# Patient Record
Sex: FEMALE | Race: BLACK OR AFRICAN AMERICAN | NOT HISPANIC OR LATINO | Employment: FULL TIME | URBAN - METROPOLITAN AREA
[De-identification: names, ages, dates, MRNs, and addresses within clinical notes are randomized per-mention and may not be internally consistent; named-entity substitution may affect disease eponyms.]

---

## 2018-07-20 ENCOUNTER — OFFICE VISIT (OUTPATIENT)
Dept: FAMILY MEDICINE CLINIC | Facility: CLINIC | Age: 38
End: 2018-07-20
Payer: COMMERCIAL

## 2018-07-20 VITALS
HEIGHT: 67 IN | HEART RATE: 80 BPM | WEIGHT: 227.2 LBS | BODY MASS INDEX: 35.66 KG/M2 | RESPIRATION RATE: 16 BRPM | TEMPERATURE: 98.1 F | DIASTOLIC BLOOD PRESSURE: 90 MMHG | SYSTOLIC BLOOD PRESSURE: 140 MMHG

## 2018-07-20 DIAGNOSIS — R03.0 ELEVATED BP WITHOUT DIAGNOSIS OF HYPERTENSION: ICD-10-CM

## 2018-07-20 DIAGNOSIS — N92.0 MENORRHAGIA WITH REGULAR CYCLE: ICD-10-CM

## 2018-07-20 DIAGNOSIS — G43.109 MIGRAINE WITH AURA AND WITHOUT STATUS MIGRAINOSUS, NOT INTRACTABLE: Primary | ICD-10-CM

## 2018-07-20 PROCEDURE — 99203 OFFICE O/P NEW LOW 30 MIN: CPT | Performed by: FAMILY MEDICINE

## 2018-07-20 RX ORDER — TOPIRAMATE 25 MG/1
25 TABLET ORAL DAILY
Qty: 30 TABLET | Refills: 0 | Status: SHIPPED | OUTPATIENT
Start: 2018-07-20 | End: 2018-08-31 | Stop reason: SDUPTHER

## 2018-07-20 RX ORDER — SUMATRIPTAN 25 MG/1
25 TABLET, FILM COATED ORAL ONCE AS NEEDED
Qty: 30 TABLET | Refills: 0 | Status: SHIPPED | OUTPATIENT
Start: 2018-07-20 | End: 2019-10-02

## 2018-07-20 NOTE — PROGRESS NOTES
Assessment/Plan     Migraine with aura and without status migrainosus, not intractable  -  Will start Topamax at 25 mg HS and will increase it to 50 mg HS in a week for prophylaxis with sumatriptan 25 mg for acute migraine  - patient to follow up in 1 month    Elevated BP without diagnosis of hypertension  - blood pressure 140/90 today  - not on any antihypertensives  - recommended lifestyle modifications with dash diet and exercise  - follow-up in 4 weeks  - will also check fasting CMP, lipid panel    Menorrhagia with regular cycle  - patient reports using up to 7 pads in a day and periods lasting for 7 days, but regular  - will check CBC, TSH  - history of abnormal Pap smear patient to come back for Pap smear as well    Diagnoses and all orders for this visit:    Migraine with aura and without status migrainosus, not intractable  -     topiramate (TOPAMAX) 25 mg tablet; Take 1 tablet (25 mg total) by mouth daily  -     SUMAtriptan (IMITREX) 25 mg tablet; Take 1 tablet (25 mg total) by mouth once as needed for migraine for up to 1 dose Maximum 200 mg in 24 hours, may repeat dose x1 after 2 h    Menorrhagia with regular cycle  -     TSH, 3rd generation with T4 reflex; Future  -     CBC and Platelet; Future    Elevated BP without diagnosis of hypertension  -     Comprehensive metabolic panel; Future  -     Lipid panel; Future       Subjective      new patient establish care       74-year-old female presented to office to establish care  She has no acute concerns today but does have history of chronic migraines since childhood, earlier she was on propranolol for prophylaxis and sumatriptan for breakthrough but she states that it did not help as much  She has last time she saw doctor was 2 years ago and since they were not helping she decided to discontinue those medications  She also complains of heavy periods with periods lasting for 7 days with change of 7 pads in a day, last time she had a  Was 2 weeks ago    She does have history of abnormal Pap smears and last time he had she had a Pap smear was years ago  Her youngest child is 15years old delivered by   She is not sexually active and not on any contraception  Review of Systems   Constitutional: Negative for activity change, chills and fever  HENT: Negative for congestion, facial swelling and rhinorrhea  Eyes: Negative for discharge  Respiratory: Negative for shortness of breath  Cardiovascular: Negative for chest pain  Gastrointestinal: Negative for diarrhea, nausea and vomiting  Endocrine: Negative for cold intolerance  Genitourinary: Negative for difficulty urinating  Neurological: Positive for headaches  Negative for tremors, seizures, syncope and numbness  Psychiatric/Behavioral: Negative for agitation and behavioral problems  Objective     Vitals:Blood pressure 140/90, pulse 80, temperature 98 1 °F (36 7 °C), resp  rate 16, height 5' 6 8" (1 697 m), weight 103 kg (227 lb 3 2 oz)  Physical Exam:  Physical Exam   Constitutional: She is oriented to person, place, and time  She appears well-developed and well-nourished  HENT:   Head: Normocephalic and atraumatic  Right Ear: External ear normal    Left Ear: External ear normal    Mouth/Throat: Oropharynx is clear and moist    Eyes: Conjunctivae and EOM are normal  Pupils are equal, round, and reactive to light  Neck: Normal range of motion  Neck supple  Cardiovascular: Normal rate, regular rhythm, normal heart sounds and intact distal pulses  Exam reveals no friction rub  No murmur heard  Pulmonary/Chest: Effort normal and breath sounds normal  No respiratory distress  She has no wheezes  She has no rales  Abdominal: Soft  Bowel sounds are normal  She exhibits no distension  There is no tenderness  Musculoskeletal: Normal range of motion  Neurological: She is alert and oriented to person, place, and time  Skin: Skin is warm and dry

## 2018-07-20 NOTE — ASSESSMENT & PLAN NOTE
- blood pressure 140/90 today  - not on any antihypertensives  - recommended lifestyle modifications with dash diet and exercise  - follow-up in 4 weeks  - will also check fasting CMP, lipid panel

## 2018-07-20 NOTE — ASSESSMENT & PLAN NOTE
-  Will start Topamax at 25 mg HS and will increase it to 50 mg HS in a week for prophylaxis with sumatriptan 25 mg for acute migraine  - patient to follow up in 1 month

## 2018-07-20 NOTE — ASSESSMENT & PLAN NOTE
- patient reports using up to 7 pads in a day and periods lasting for 7 days, but regular  - will check CBC, TSH  - history of abnormal Pap smear patient to come back for Pap smear as well

## 2018-07-27 ENCOUNTER — TRANSCRIBE ORDERS (OUTPATIENT)
Dept: LAB | Facility: CLINIC | Age: 38
End: 2018-07-27

## 2018-07-27 ENCOUNTER — APPOINTMENT (OUTPATIENT)
Dept: LAB | Facility: CLINIC | Age: 38
End: 2018-07-27
Payer: COMMERCIAL

## 2018-07-27 DIAGNOSIS — R03.0 ELEVATED BP WITHOUT DIAGNOSIS OF HYPERTENSION: ICD-10-CM

## 2018-07-27 DIAGNOSIS — N92.0 MENORRHAGIA WITH REGULAR CYCLE: ICD-10-CM

## 2018-07-27 LAB
ALBUMIN SERPL BCP-MCNC: 3 G/DL (ref 3.5–5)
ALP SERPL-CCNC: 71 U/L (ref 46–116)
ALT SERPL W P-5'-P-CCNC: 21 U/L (ref 12–78)
ANION GAP SERPL CALCULATED.3IONS-SCNC: 7 MMOL/L (ref 4–13)
AST SERPL W P-5'-P-CCNC: 17 U/L (ref 5–45)
BILIRUB SERPL-MCNC: 0.4 MG/DL (ref 0.2–1)
BUN SERPL-MCNC: 9 MG/DL (ref 5–25)
CALCIUM SERPL-MCNC: 8.1 MG/DL (ref 8.3–10.1)
CHLORIDE SERPL-SCNC: 105 MMOL/L (ref 100–108)
CHOLEST SERPL-MCNC: 189 MG/DL (ref 50–200)
CO2 SERPL-SCNC: 25 MMOL/L (ref 21–32)
CREAT SERPL-MCNC: 0.83 MG/DL (ref 0.6–1.3)
ERYTHROCYTE [DISTWIDTH] IN BLOOD BY AUTOMATED COUNT: 16.8 % (ref 11.6–15.1)
GFR SERPL CREATININE-BSD FRML MDRD: 103 ML/MIN/1.73SQ M
GLUCOSE P FAST SERPL-MCNC: 98 MG/DL (ref 65–99)
HCT VFR BLD AUTO: 36.7 % (ref 34.8–46.1)
HDLC SERPL-MCNC: 58 MG/DL (ref 40–60)
HGB BLD-MCNC: 11.4 G/DL (ref 11.5–15.4)
LDLC SERPL CALC-MCNC: 112 MG/DL (ref 0–100)
MCH RBC QN AUTO: 25.2 PG (ref 26.8–34.3)
MCHC RBC AUTO-ENTMCNC: 31.1 G/DL (ref 31.4–37.4)
MCV RBC AUTO: 81 FL (ref 82–98)
NONHDLC SERPL-MCNC: 131 MG/DL
PLATELET # BLD AUTO: 421 THOUSANDS/UL (ref 149–390)
PMV BLD AUTO: 9.4 FL (ref 8.9–12.7)
POTASSIUM SERPL-SCNC: 3.4 MMOL/L (ref 3.5–5.3)
PROT SERPL-MCNC: 7.7 G/DL (ref 6.4–8.2)
RBC # BLD AUTO: 4.52 MILLION/UL (ref 3.81–5.12)
SODIUM SERPL-SCNC: 137 MMOL/L (ref 136–145)
TRIGL SERPL-MCNC: 96 MG/DL
TSH SERPL DL<=0.05 MIU/L-ACNC: 0.95 UIU/ML (ref 0.36–3.74)
WBC # BLD AUTO: 7.21 THOUSAND/UL (ref 4.31–10.16)

## 2018-07-27 PROCEDURE — 80053 COMPREHEN METABOLIC PANEL: CPT

## 2018-07-27 PROCEDURE — 84443 ASSAY THYROID STIM HORMONE: CPT

## 2018-07-27 PROCEDURE — 36415 COLL VENOUS BLD VENIPUNCTURE: CPT

## 2018-07-27 PROCEDURE — 80061 LIPID PANEL: CPT

## 2018-07-27 PROCEDURE — 85027 COMPLETE CBC AUTOMATED: CPT

## 2018-08-13 ENCOUNTER — OFFICE VISIT (OUTPATIENT)
Dept: FAMILY MEDICINE CLINIC | Facility: CLINIC | Age: 38
End: 2018-08-13
Payer: COMMERCIAL

## 2018-08-13 VITALS
HEIGHT: 67 IN | DIASTOLIC BLOOD PRESSURE: 90 MMHG | TEMPERATURE: 98.9 F | HEART RATE: 82 BPM | WEIGHT: 222.4 LBS | BODY MASS INDEX: 34.91 KG/M2 | RESPIRATION RATE: 16 BRPM | SYSTOLIC BLOOD PRESSURE: 130 MMHG

## 2018-08-13 DIAGNOSIS — B30.9 ACUTE VIRAL CONJUNCTIVITIS OF RIGHT EYE: ICD-10-CM

## 2018-08-13 DIAGNOSIS — G43.109 MIGRAINE WITH AURA AND WITHOUT STATUS MIGRAINOSUS, NOT INTRACTABLE: Primary | ICD-10-CM

## 2018-08-13 DIAGNOSIS — J02.9 PHARYNGITIS, UNSPECIFIED ETIOLOGY: ICD-10-CM

## 2018-08-13 PROCEDURE — 99213 OFFICE O/P EST LOW 20 MIN: CPT | Performed by: FAMILY MEDICINE

## 2018-08-13 RX ORDER — IBUPROFEN 600 MG/1
600 TABLET ORAL EVERY 6 HOURS PRN
Qty: 90 TABLET | Refills: 0 | Status: SHIPPED | OUTPATIENT
Start: 2018-08-13 | End: 2022-03-10 | Stop reason: ALTCHOICE

## 2018-08-13 RX ORDER — NEOMYCIN/POLYMYXIN B/HYDROCORT 3.5-10K-1
1 SUSPENSION, DROPS(FINAL DOSAGE FORM)(ML) OPHTHALMIC (EYE) 3 TIMES DAILY
Qty: 7.5 ML | Refills: 0 | Status: SHIPPED | OUTPATIENT
Start: 2018-08-13 | End: 2018-08-31 | Stop reason: ALTCHOICE

## 2018-08-13 RX ORDER — KETOROLAC TROMETHAMINE 30 MG/ML
30 INJECTION, SOLUTION INTRAMUSCULAR; INTRAVENOUS ONCE
Status: COMPLETED | OUTPATIENT
Start: 2018-08-13 | End: 2018-08-13

## 2018-08-13 RX ADMIN — KETOROLAC TROMETHAMINE 30 MG: 30 INJECTION, SOLUTION INTRAMUSCULAR; INTRAVENOUS at 12:15

## 2018-08-13 NOTE — ASSESSMENT & PLAN NOTE
Given high risk work setting will Rx ophth antibiotic solution and return to work after 24 hours  Hand washing stressed    If symptoms are not improving recommend ophthalmology exam

## 2018-08-13 NOTE — LETTER
August 13, 2018     Patient: Catarino Madden   YOB: 1980   Date of Visit: 8/13/2018       To Whom it May Concern:    Catarino Madden is under my professional care  She was seen in my office on 8/13/2018  She may return to work on Wednesday, August 15, 2018       If you have any questions or concerns, please don't hesitate to call           Sincerely,          Terri Cedillo MD        CC: No Recipients

## 2018-08-13 NOTE — PROGRESS NOTES
Rabia Andres 1980 female MRN: [id number]    Family Medicine Acute Visit    ASSESSMENT/PLAN  Problem List Items Addressed This Visit        Digestive    Pharyngitis     Likely viral   Symptomatic treatment with ibuprofen and plenty of fluids  Cardiovascular and Mediastinum    Migraine with aura and without status migrainosus, not intractable - Primary     No neurological deficits  Stop sumatriptan as it's not likely to be effective this many days in to HA  Toradol 30 mg IM today in office, then ibuprofen 600 mg Q6h prn x 1-2 days  Ensure adequate hydration  Return for re-evaluation if not improving with resolution of viral infection  Continue topiramate for prophylaxis, discuss titration of med dose with PCP  Relevant Medications    ibuprofen (MOTRIN) 600 mg tablet    ketorolac (TORADOL) injection 30 mg (Completed)       Other    Acute viral conjunctivitis of right eye     Given high risk work setting will Rx ophth antibiotic solution and return to work after 24 hours  Hand washing stressed  If symptoms are not improving recommend ophthalmology exam            Relevant Medications    neomycin-polymyxin-hydrocortisone (CORTISPORIN) 0 35%-10,000 units/mL-1% ophthalmic suspension    ketorolac (TORADOL) injection 30 mg (Completed)              Future Appointments  Date Time Provider Bianca Dutta   8/17/2018 10:30 AM Monik Russo MD [facility]          SUBJECTIVE  CC: Conjunctivitis; Sore Throat; and Headache      HPI:  Rabia Andres is a 44 y o  female who presents for 4 days of sore throat similar 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  Throat pain is starting to improve  Had ear pain now resolved  Yesterday developed cough and purulent d/c from R eye  Current HA associated with blurred vision, but h/o migraines and likely aggravated by current illness  Has been using sumatriptan without relief and taking topiramate daily          Review of Systems Constitutional: Negative for chills and fever  HENT: Positive for congestion, ear pain, rhinorrhea and sore throat  Negative for ear discharge and trouble swallowing  Eyes: Positive for discharge, redness and visual disturbance  Respiratory: Positive for cough  Negative for wheezing  Cardiovascular: Negative for chest pain  Gastrointestinal: Positive for nausea (due to migraine)  Negative for abdominal pain  Genitourinary: Negative for difficulty urinating and dysuria  Skin: Negative for rash  Neurological: Positive for headaches  Negative for dizziness and numbness  Historical Information   The patient history was reviewed as follows:  Past Medical History:   Diagnosis Date    Heart murmur          Past Surgical History:   Procedure Laterality Date     SECTION      CHOLECYSTECTOMY       No family history on file  Social History   History   Alcohol use Not on file     History   Drug Use No     History   Smoking Status    Never Smoker   Smokeless Tobacco    Not on file       Medications:     Current Outpatient Prescriptions:     SUMAtriptan (IMITREX) 25 mg tablet, Take 1 tablet (25 mg total) by mouth once as needed for migraine for up to 1 dose Maximum 200 mg in 24 hours, may repeat dose x1 after 2 h, Disp: 30 tablet, Rfl: 0    topiramate (TOPAMAX) 25 mg tablet, Take 1 tablet (25 mg total) by mouth daily, Disp: 30 tablet, Rfl: 0    ibuprofen (MOTRIN) 600 mg tablet, Take 1 tablet (600 mg total) by mouth every 6 (six) hours as needed for mild pain or headaches, Disp: 90 tablet, Rfl: 0    neomycin-polymyxin-hydrocortisone (CORTISPORIN) 0 35%-10,000 units/mL-1% ophthalmic suspension, Administer 1 drop to the right eye 3 (three) times a day, Disp: 7 5 mL, Rfl: 0  No current facility-administered medications for this visit       No Known Allergies    OBJECTIVE  Vitals:   Vitals:    18 1048   BP: 130/90   Pulse: 82   Resp: 16   Temp: 98 9 °F (37 2 °C)   Weight: 101 kg (222 lb 6 4 oz)   Height: 5' 6 8" (1 697 m)       Physical Exam   Constitutional: She is oriented to person, place, and time  She appears well-developed and well-nourished  No distress  Mildly ill-appearing, non-toxic   HENT:   Head: Normocephalic and atraumatic  Right Ear: Tympanic membrane, external ear and ear canal normal    Left Ear: Tympanic membrane, external ear and ear canal normal    Nose: Nose normal    Mouth/Throat: Oropharynx is clear and moist    Eyes: EOM are normal  Pupils are equal, round, and reactive to light  Right eye exhibits discharge (white/clear)  Left eye exhibits no discharge  Right conjunctiva is injected  Left conjunctiva is not injected  No scleral icterus  Neck: Neck supple  No thyromegaly present  Cardiovascular: Normal rate, regular rhythm, normal heart sounds and intact distal pulses  Pulmonary/Chest: Effort normal and breath sounds normal    Abdominal: Soft  Bowel sounds are normal  She exhibits no distension  There is no tenderness  Musculoskeletal: She exhibits no edema  Lymphadenopathy:     She has no cervical adenopathy  Neurological: She is alert and oriented to person, place, and time  She displays normal reflexes  No cranial nerve deficit  She exhibits normal muscle tone  Skin: No rash noted  No pallor  Psychiatric: She has a normal mood and affect  Vitals reviewed         Aliya Nagel MD  [address]  8/13/2018

## 2018-08-13 NOTE — ASSESSMENT & PLAN NOTE
No neurological deficits  Stop sumatriptan as it's not likely to be effective this many days in to HA  Toradol 30 mg IM today in office, then ibuprofen 600 mg Q6h prn x 1-2 days  Ensure adequate hydration  Return for re-evaluation if not improving with resolution of viral infection  Continue topiramate for prophylaxis, discuss titration of med dose with PCP

## 2018-08-17 ENCOUNTER — OFFICE VISIT (OUTPATIENT)
Dept: FAMILY MEDICINE CLINIC | Facility: CLINIC | Age: 38
End: 2018-08-17
Payer: COMMERCIAL

## 2018-08-17 VITALS
BODY MASS INDEX: 35.31 KG/M2 | DIASTOLIC BLOOD PRESSURE: 80 MMHG | WEIGHT: 225 LBS | SYSTOLIC BLOOD PRESSURE: 128 MMHG | HEIGHT: 67 IN | RESPIRATION RATE: 16 BRPM | HEART RATE: 78 BPM | TEMPERATURE: 97.6 F

## 2018-08-17 DIAGNOSIS — Z12.4 CERVICAL CANCER SCREENING: Primary | ICD-10-CM

## 2018-08-17 DIAGNOSIS — N92.0 MENORRHAGIA WITH REGULAR CYCLE: ICD-10-CM

## 2018-08-17 PROCEDURE — 87591 N.GONORRHOEAE DNA AMP PROB: CPT | Performed by: FAMILY MEDICINE

## 2018-08-17 PROCEDURE — G0145 SCR C/V CYTO,THINLAYER,RESCR: HCPCS | Performed by: FAMILY MEDICINE

## 2018-08-17 PROCEDURE — 87491 CHLMYD TRACH DNA AMP PROBE: CPT | Performed by: FAMILY MEDICINE

## 2018-08-17 PROCEDURE — 87624 HPV HI-RISK TYP POOLED RSLT: CPT | Performed by: FAMILY MEDICINE

## 2018-08-17 PROCEDURE — 99213 OFFICE O/P EST LOW 20 MIN: CPT | Performed by: FAMILY MEDICINE

## 2018-08-17 NOTE — PROGRESS NOTES
ASSESSMENT & PLAN: Jose J Villeda is a 44 y o   with normal gynecologic exam     1   Routine well woman exam done today  2  Pap and HPV:  The patient's  Pap and cotesting was done today  Current ASCCP Guidelines reviewed  3  The following were reviewed in today's visit: breast self exam, STD testing, exercise and healthy diet  4  Menorrhagia: labs reviewed   TSH normal  Will also get US of pelvic organs and have patient follow up once we have results of  Pap smear  5  Obesity  Pt counseled on lifestyle modification with weight and exercise  FLP reviewed ,   Pt agreeable on dietary modification and exercise  CC:  Annual Gynecologic Examination    HPI: Jose J Villeda is a 44 y o   who presents for annual gynecologic examination  History of abnormal pap smear in , had colposcopy done at that time which was normal , subsequent pap smears normal   Last Pap smear was 4 or 5 years ago,, patient does not have records   LMP 2 weeks ago, patient complains of heavy periods with change of pad 7 times in a day for last few years, with passage of clots  Patient's last menstrual period was 2018 (approximate)  History of sexually transmitted infection No  Patient is currently sexually active  heterosexual Birth control: none  Health Maintenance:    She exercises 2 days per week with walks  She wears her seatbelt routinely  She does not perform regular monthly self breast exams  She feels safe at home  Patients does follow a healthy diet  Past Surgical History:   Procedure Laterality Date     SECTION      CHOLECYSTECTOMY         Past OB/Gyn History:  OB History      Para Term  AB Living    4 4       3    SAB TAB Ectopic Multiple Live Births                        No family history on file      Social History:  Social History     Social History    Marital status: Single     Spouse name: N/A    Number of children: N/A    Years of education: N/A Occupational History    Not on file  Social History Main Topics    Smoking status: Never Smoker    Smokeless tobacco: Not on file    Alcohol use Not on file    Drug use: No    Sexual activity: Not on file     Other Topics Concern    Not on file     Social History Narrative    No narrative on file     Presently lives with kids  No Known Allergies    Current Outpatient Prescriptions:     ibuprofen (MOTRIN) 600 mg tablet, Take 1 tablet (600 mg total) by mouth every 6 (six) hours as needed for mild pain or headaches, Disp: 90 tablet, Rfl: 0    neomycin-polymyxin-hydrocortisone (CORTISPORIN) 0 35%-10,000 units/mL-1% ophthalmic suspension, Administer 1 drop to the right eye 3 (three) times a day, Disp: 7 5 mL, Rfl: 0    SUMAtriptan (IMITREX) 25 mg tablet, Take 1 tablet (25 mg total) by mouth once as needed for migraine for up to 1 dose Maximum 200 mg in 24 hours, may repeat dose x1 after 2 h, Disp: 30 tablet, Rfl: 0    topiramate (TOPAMAX) 25 mg tablet, Take 1 tablet (25 mg total) by mouth daily, Disp: 30 tablet, Rfl: 0    Review of Systems:  Review of Systems   Constitutional: Negative for activity change, chills, fatigue and fever  HENT: Negative for congestion, hearing loss, postnasal drip, rhinorrhea and sore throat  Respiratory: Negative for cough, chest tightness, shortness of breath and wheezing  Gastrointestinal: Negative for abdominal distention, blood in stool, diarrhea, nausea and vomiting  Genitourinary: Positive for vaginal bleeding (excessive vaginal bleeding during periods)  Negative for difficulty urinating  Musculoskeletal: Negative for arthralgias and back pain  Physical Exam:  /80   Pulse 78   Temp 97 6 °F (36 4 °C)   Resp 16   Ht 5' 6 8" (1 697 m)   Wt 102 kg (225 lb)   LMP 08/06/2018 (Approximate)   BMI 35 45 kg/m²    GEN: The patient was alert and oriented x3, pleasant well-appearing female in no acute distress     HEENT:  Unremarkable, no anterior or posterior lymphadenopathy, no thyromegaly  CV:  RRR, no murmurs  RESP:  Clear to auscultation bilaterally  BREAST:  Symmetric breasts with no palpable breast masses or obvious breast lesions  She has no retractions or nipple discharge  She has no axillary abnormalities or palpable masses  Self breast exam is taught  ABD:  Soft, nontender, nondistended, normoactive bowel sounds  EXT:  WWP, nontender, no edema  BACK:  No CVA tenderness, no tenderness to palpation along spine  PELVIC:  Normal appearing external female genitalia, normal vaginal epithelium, No discharge  Cervix present   Bimanual: absent CMT,   Uterus enlarged to 8 weeks size, firm , non-tender  No palpable adnexal masses

## 2018-08-20 LAB
CHLAMYDIA DNA CVX QL NAA+PROBE: NORMAL
N GONORRHOEA DNA GENITAL QL NAA+PROBE: NORMAL

## 2018-08-21 LAB
HPV HR 12 DNA CVX QL NAA+PROBE: NEGATIVE
HPV16 DNA CVX QL NAA+PROBE: NEGATIVE
HPV18 DNA CVX QL NAA+PROBE: NEGATIVE

## 2018-08-22 LAB
LAB AP GYN PRIMARY INTERPRETATION: NORMAL
Lab: NORMAL
PATH INTERP SPEC-IMP: NORMAL

## 2018-08-31 ENCOUNTER — OFFICE VISIT (OUTPATIENT)
Dept: FAMILY MEDICINE CLINIC | Facility: CLINIC | Age: 38
End: 2018-08-31
Payer: COMMERCIAL

## 2018-08-31 VITALS
DIASTOLIC BLOOD PRESSURE: 80 MMHG | HEIGHT: 67 IN | HEART RATE: 74 BPM | BODY MASS INDEX: 35.66 KG/M2 | TEMPERATURE: 98 F | WEIGHT: 227.2 LBS | RESPIRATION RATE: 14 BRPM | SYSTOLIC BLOOD PRESSURE: 124 MMHG

## 2018-08-31 DIAGNOSIS — N92.0 MENORRHAGIA WITH REGULAR CYCLE: ICD-10-CM

## 2018-08-31 DIAGNOSIS — R03.0 ELEVATED BP WITHOUT DIAGNOSIS OF HYPERTENSION: ICD-10-CM

## 2018-08-31 DIAGNOSIS — G43.109 MIGRAINE WITH AURA AND WITHOUT STATUS MIGRAINOSUS, NOT INTRACTABLE: Primary | ICD-10-CM

## 2018-08-31 PROBLEM — J02.9 PHARYNGITIS: Status: RESOLVED | Noted: 2018-08-13 | Resolved: 2018-08-31

## 2018-08-31 PROBLEM — B30.9 ACUTE VIRAL CONJUNCTIVITIS OF RIGHT EYE: Status: RESOLVED | Noted: 2018-08-13 | Resolved: 2018-08-31

## 2018-08-31 PROCEDURE — 99213 OFFICE O/P EST LOW 20 MIN: CPT | Performed by: FAMILY MEDICINE

## 2018-08-31 RX ORDER — TOPIRAMATE 25 MG/1
50 TABLET ORAL 2 TIMES DAILY
Qty: 60 TABLET | Refills: 2 | Status: SHIPPED | OUTPATIENT
Start: 2018-08-31 | End: 2018-08-31 | Stop reason: DRUGHIGH

## 2018-08-31 RX ORDER — TOPIRAMATE 50 MG/1
50 TABLET, FILM COATED ORAL 2 TIMES DAILY
Qty: 60 TABLET | Refills: 3 | Status: SHIPPED | OUTPATIENT
Start: 2018-08-31 | End: 2019-10-02

## 2018-08-31 NOTE — ASSESSMENT & PLAN NOTE
Patient reports using up to 7 pads in a day and periods lasting for 7 days, but regular, TSH 0 95, Hb 11 4, Pap smear normal   Advised patient to get ultrasound of pelvic organs done, patient scheduled for it next week    Will follow up

## 2018-08-31 NOTE — ASSESSMENT & PLAN NOTE
Patient is still getting headaches every week, currently taking Topamax 50 mg daily  Advised her to increase Topamax to 50 mg b i d  Also advised her to keep a log of headaches  Told patient that she may take ibuprofen 600 mg Q 8 hr p r n  in case of acute headache   will have her follow up in 4 weeks

## 2018-08-31 NOTE — PROGRESS NOTES
Assessment/Plan     Migraine with aura and without status migrainosus, not intractable  Patient is still getting headaches every week, currently taking Topamax 50 mg daily  Advised her to increase Topamax to 50 mg b i d  Also advised her to keep a log of headaches  Told patient that she may take ibuprofen 600 mg Q 8 hr p r n  in case of acute headache   will have her follow up in 4 weeks  Elevated BP without diagnosis of hypertension    Blood pressure 124/80 today, reviewed labs with patient  Advised to continue Dash diet with lifestyle modifications  Menorrhagia with regular cycle  Patient reports using up to 7 pads in a day and periods lasting for 7 days, but regular, TSH 0 95, Hb 11 4, Pap smear normal   Advised patient to get ultrasound of pelvic organs done, patient scheduled for it next week  Will follow up      Diagnoses and all orders for this visit:    Migraine with aura and without status migrainosus, not intractable  -     topiramate (TOPAMAX) 50 mg tablet; by mouth 2 (two) times a day    Menorrhagia with regular cycle         Subjective     Chief Complaint   Patient presents with    Physical Exam        26-year-old female presented to office for a follow-up visit, patient reports that since starting Topamax the headaches are not that severe but are still occurring every week  Patient states that sumatriptan does not help her and she is not taking that anymore, also she does not take any medication once the headache starts  Patient also menorrhagia currently having periods, on 2nd day, reports using up to 7 pads per day  Patient was advised to get ultrasound done in  Last visit  But could not get it done, will get it done next week  No other acute concerns this visit          The following portions of the patient's history were reviewed and updated as appropriate: allergies, current medications, past family history, past medical history, past social history, past surgical history and problem list     Review of Systems   Constitutional: Negative for activity change, diaphoresis, fatigue and fever  HENT: Negative for congestion, rhinorrhea and sore throat  Respiratory: Negative for cough, chest tightness, shortness of breath and wheezing  Cardiovascular: Negative for chest pain and palpitations  Gastrointestinal: Negative for abdominal distention, diarrhea, nausea and vomiting  Genitourinary: Negative for difficulty urinating  Musculoskeletal: Negative for back pain  Skin: Negative for rash  Neurological: Positive for headaches  Negative for seizures and weakness  Psychiatric/Behavioral: Negative for agitation  Objective     Vitals:Blood pressure 124/80, pulse 74, temperature 98 °F (36 7 °C), temperature source Tympanic, resp  rate 14, height 5' 6 8" (1 697 m), weight 103 kg (227 lb 3 2 oz), last menstrual period 08/06/2018  Physical Exam:  Physical Exam   Constitutional: She is oriented to person, place, and time  She appears well-developed and well-nourished  HENT:   Head: Normocephalic and atraumatic  Mouth/Throat: Oropharynx is clear and moist    Eyes: Conjunctivae and EOM are normal  Pupils are equal, round, and reactive to light  Neck: Normal range of motion  Neck supple  Cardiovascular: Normal rate, regular rhythm and normal heart sounds  Exam reveals no friction rub  No murmur heard  Pulmonary/Chest: Effort normal and breath sounds normal  No respiratory distress  She has no wheezes  She has no rales  Abdominal: Soft  Bowel sounds are normal  She exhibits no distension  There is no tenderness  Musculoskeletal: Normal range of motion  Neurological: She is alert and oriented to person, place, and time  Skin: Skin is warm and dry  Vitals reviewed

## 2018-08-31 NOTE — ASSESSMENT & PLAN NOTE
Blood pressure 124/80 today, reviewed labs with patient  Advised to continue Dash diet with lifestyle modifications

## 2019-07-17 ENCOUNTER — OFFICE VISIT (OUTPATIENT)
Dept: FAMILY MEDICINE CLINIC | Facility: CLINIC | Age: 39
End: 2019-07-17

## 2019-07-17 VITALS
BODY MASS INDEX: 36.65 KG/M2 | WEIGHT: 232.6 LBS | RESPIRATION RATE: 18 BRPM | TEMPERATURE: 99.2 F | DIASTOLIC BLOOD PRESSURE: 88 MMHG | HEART RATE: 104 BPM | SYSTOLIC BLOOD PRESSURE: 136 MMHG

## 2019-07-17 DIAGNOSIS — J01.80 ACUTE NON-RECURRENT SINUSITIS OF OTHER SINUS: Primary | ICD-10-CM

## 2019-07-17 PROCEDURE — 99213 OFFICE O/P EST LOW 20 MIN: CPT | Performed by: PHYSICIAN ASSISTANT

## 2019-07-17 RX ORDER — FLUTICASONE PROPIONATE 50 MCG
2 SPRAY, SUSPENSION (ML) NASAL DAILY PRN
Qty: 1 BOTTLE | Refills: 0 | Status: SHIPPED | OUTPATIENT
Start: 2019-07-17 | End: 2019-10-02

## 2019-07-17 RX ORDER — AMOXICILLIN AND CLAVULANATE POTASSIUM 875; 125 MG/1; MG/1
1 TABLET, FILM COATED ORAL EVERY 12 HOURS SCHEDULED
Qty: 20 TABLET | Refills: 0 | Status: SHIPPED | OUTPATIENT
Start: 2019-07-17 | End: 2019-07-27

## 2019-07-17 NOTE — PROGRESS NOTES
Assessment/Plan:    Other acute sinusitis  Augmentin 875mg Bid x 10 days  Flonase 2 sprays each nostril qd prn  OTC pain medication prn      Diagnoses and all orders for this visit:    Acute non-recurrent sinusitis of other sinus  -     amoxicillin-clavulanate (AUGMENTIN) 875-125 mg per tablet; Take 1 tablet by mouth every 12 (twelve) hours for 10 days  -     fluticasone (FLONASE) 50 mcg/act nasal spray; 2 sprays into each nostril daily as needed for rhinitis        Subjective:      Patient ID: Tamara Mejia is a 43 y o  female  C/o cold symptoms x 2 weeks  URI    This is a new problem  Episode onset: 2 weeks  The problem has been gradually worsening  There has been no fever  Associated symptoms include congestion, coughing, rhinorrhea, sinus pain and sneezing  Pertinent negatives include no sore throat  Treatments tried: Dayquil  The treatment provided no relief  The following portions of the patient's history were reviewed and updated as appropriate: allergies, current medications, past family history, past medical history, past social history, past surgical history and problem list     Review of Systems   Constitutional: Negative for chills and fever  HENT: Positive for congestion, rhinorrhea, sinus pressure, sinus pain and sneezing  Negative for ear discharge, facial swelling, sore throat and trouble swallowing  Respiratory: Positive for cough  Negative for chest tightness  Cardiovascular: Negative  Objective:      /88 (BP Location: Left arm, Patient Position: Sitting, Cuff Size: Large)   Pulse 104   Temp 99 2 °F (37 3 °C) (Tympanic)   Resp 18   Wt 106 kg (232 lb 9 6 oz)   BMI 36 65 kg/m²          Physical Exam   Constitutional: She is oriented to person, place, and time  She appears well-developed and well-nourished  Non-toxic appearance  She does not have a sickly appearance  She does not appear ill  No distress     HENT:   Right Ear: Tympanic membrane, external ear and ear canal normal    Left Ear: Tympanic membrane, external ear and ear canal normal    Nose: Rhinorrhea present  Right sinus exhibits maxillary sinus tenderness and frontal sinus tenderness  Left sinus exhibits no maxillary sinus tenderness and no frontal sinus tenderness  Mouth/Throat: Posterior oropharyngeal erythema present  No tonsillar exudate  Bilateral turbinates erythema and inflammation   Eyes: Pupils are equal, round, and reactive to light  Neck: Normal range of motion  Neck supple  Cardiovascular: Normal rate, regular rhythm and normal heart sounds  Pulmonary/Chest: Effort normal and breath sounds normal  No respiratory distress  She has no wheezes  Abdominal: Soft  Bowel sounds are normal  There is no tenderness  Lymphadenopathy:     She has no cervical adenopathy  Neurological: She is alert and oriented to person, place, and time  Skin: Skin is warm and dry  Psychiatric: She has a normal mood and affect   Her behavior is normal

## 2019-07-17 NOTE — PATIENT INSTRUCTIONS

## 2019-10-02 ENCOUNTER — OFFICE VISIT (OUTPATIENT)
Dept: FAMILY MEDICINE CLINIC | Facility: CLINIC | Age: 39
End: 2019-10-02

## 2019-10-02 VITALS
WEIGHT: 258.6 LBS | HEIGHT: 66 IN | TEMPERATURE: 99.3 F | BODY MASS INDEX: 41.56 KG/M2 | DIASTOLIC BLOOD PRESSURE: 80 MMHG | SYSTOLIC BLOOD PRESSURE: 124 MMHG | HEART RATE: 88 BPM | RESPIRATION RATE: 16 BRPM

## 2019-10-02 DIAGNOSIS — N92.1 MENORRHAGIA WITH IRREGULAR CYCLE: Primary | ICD-10-CM

## 2019-10-02 DIAGNOSIS — Z00.00 PHYSICAL EXAM, ANNUAL: ICD-10-CM

## 2019-10-02 PROCEDURE — 99396 PREV VISIT EST AGE 40-64: CPT | Performed by: PHYSICIAN ASSISTANT

## 2019-10-02 PROCEDURE — 99213 OFFICE O/P EST LOW 20 MIN: CPT | Performed by: PHYSICIAN ASSISTANT

## 2019-10-02 NOTE — PROGRESS NOTES
Assessment/Plan:    Physical exam, annual  Will check labs  Order given for screening mammogram  Last pap- 2018 nl  TDAP-2014    Menorrhagia with irregular cycle  Will check HCG  Will order transvaginal ultrasound  Check labs  Pt advised to call to schedule appt once ultrasound has been completed  Diagnoses and all orders for this visit:    Menorrhagia with irregular cycle  -     CBC and differential; Future  -     Ferritin; Future  -     Iron; Future  -     US pelvis complete w transvaginal; Future  -     Pregnancy Test (HCG Qualitative); Future    Physical exam, annual  -     Mammo screening bilateral w 3d & cad; Future  -     CBC and differential; Future  -     Comprehensive metabolic panel; Future  -     HEMOGLOBIN A1C W/ EAG ESTIMATION; Future  -     Lipid panel; Future  -     TSH, 3rd generation with Free T4 reflex; Future  -     UA w Reflex to Microscopic w Reflex to Culture -Lab Collect        Subjective:      Patient ID: Jazmín Hauser is a 35 y o  female  HPI     Here today for physical exam and c/o irregular heavy periods  She reports her menstrual cycles have always been heavy but the last 6 months or so she's getting her period 2x/month  Period last for 7 days and is heavy the entire time  Changes her pad every 2-2 5 hrs  Is sexually active  Does not use protection  Has tired OCP in the past with no improvement  Denies any shortness of breath, palpitations, chest pain, fatigue, PICA, swelling, or cramping  Last pap- 2018- normal    The following portions of the patient's history were reviewed and updated as appropriate: allergies, current medications, past family history, past medical history, past social history, past surgical history and problem list     Review of Systems   Constitutional: Negative for chills, fatigue and fever  Respiratory: Negative for cough, chest tightness, shortness of breath and wheezing  Cardiovascular: Negative for chest pain and palpitations  Gastrointestinal: Negative for abdominal pain, constipation, diarrhea, nausea and vomiting  Genitourinary: Positive for menstrual problem and vaginal bleeding  Negative for difficulty urinating, hematuria, pelvic pain and vaginal pain  Musculoskeletal: Negative for arthralgias, myalgias, neck pain and neck stiffness  Skin: Negative for rash and wound  Neurological: Negative for dizziness, weakness, light-headedness, numbness and headaches  Psychiatric/Behavioral: Negative for agitation and behavioral problems  The patient is not nervous/anxious  Objective:      /80 (BP Location: Left arm, Patient Position: Sitting, Cuff Size: Large)   Pulse 88   Temp 99 3 °F (37 4 °C) (Tympanic)   Resp 16   Ht 5' 6" (1 676 m)   Wt 117 kg (258 lb 9 6 oz)   BMI 41 74 kg/m²          Physical Exam   Constitutional: She is oriented to person, place, and time  She appears well-developed and well-nourished  No distress  HENT:   Head: Normocephalic and atraumatic  Right Ear: External ear normal    Left Ear: External ear normal    Nose: Nose normal    Mouth/Throat: Oropharynx is clear and moist    Eyes: Pupils are equal, round, and reactive to light  Conjunctivae and EOM are normal    Neck: Normal range of motion  Neck supple  No thyroid mass present  Cardiovascular: Normal rate, regular rhythm, S1 normal and normal heart sounds  No murmur heard  Pulmonary/Chest: Effort normal and breath sounds normal  No respiratory distress  She has no wheezes  She has no rales  Abdominal: Soft  Bowel sounds are normal  She exhibits no distension and no mass  There is no hepatosplenomegaly  There is no tenderness  There is no rebound and no guarding  Musculoskeletal: Normal range of motion  She exhibits no edema or deformity  Lymphadenopathy:     She has no cervical adenopathy  Neurological: She is alert and oriented to person, place, and time  Skin: Skin is warm and dry   Capillary refill takes less than 2 seconds  Psychiatric: She has a normal mood and affect   Her speech is normal and behavior is normal  Judgment and thought content normal

## 2019-10-02 NOTE — ASSESSMENT & PLAN NOTE
Will check HCG  Will order transvaginal ultrasound  Check labs  Pt advised to call to schedule appt once ultrasound has been completed

## 2019-10-03 ENCOUNTER — APPOINTMENT (OUTPATIENT)
Dept: LAB | Facility: CLINIC | Age: 39
End: 2019-10-03
Payer: COMMERCIAL

## 2019-10-03 ENCOUNTER — TRANSCRIBE ORDERS (OUTPATIENT)
Dept: LAB | Facility: CLINIC | Age: 39
End: 2019-10-03

## 2019-10-03 DIAGNOSIS — N92.1 MENORRHAGIA WITH IRREGULAR CYCLE: ICD-10-CM

## 2019-10-03 DIAGNOSIS — Z00.00 PHYSICAL EXAM, ANNUAL: ICD-10-CM

## 2019-10-03 LAB
ALBUMIN SERPL BCP-MCNC: 3.2 G/DL (ref 3.5–5)
ALP SERPL-CCNC: 78 U/L (ref 46–116)
ALT SERPL W P-5'-P-CCNC: 16 U/L (ref 12–78)
ANION GAP SERPL CALCULATED.3IONS-SCNC: 8 MMOL/L (ref 4–13)
AST SERPL W P-5'-P-CCNC: 12 U/L (ref 5–45)
BACTERIA UR QL AUTO: ABNORMAL /HPF
BASOPHILS # BLD AUTO: 0.06 THOUSANDS/ΜL (ref 0–0.1)
BASOPHILS NFR BLD AUTO: 1 % (ref 0–1)
BILIRUB SERPL-MCNC: 0.5 MG/DL (ref 0.2–1)
BILIRUB UR QL STRIP: NEGATIVE
BUN SERPL-MCNC: 9 MG/DL (ref 5–25)
CALCIUM SERPL-MCNC: 8.1 MG/DL (ref 8.3–10.1)
CHLORIDE SERPL-SCNC: 103 MMOL/L (ref 100–108)
CHOLEST SERPL-MCNC: 203 MG/DL (ref 50–200)
CLARITY UR: CLEAR
CO2 SERPL-SCNC: 29 MMOL/L (ref 21–32)
COLOR UR: YELLOW
CREAT SERPL-MCNC: 0.79 MG/DL (ref 0.6–1.3)
EOSINOPHIL # BLD AUTO: 0.18 THOUSAND/ΜL (ref 0–0.61)
EOSINOPHIL NFR BLD AUTO: 2 % (ref 0–6)
ERYTHROCYTE [DISTWIDTH] IN BLOOD BY AUTOMATED COUNT: 17.8 % (ref 11.6–15.1)
EST. AVERAGE GLUCOSE BLD GHB EST-MCNC: 134 MG/DL
FERRITIN SERPL-MCNC: 5 NG/ML (ref 8–388)
GFR SERPL CREATININE-BSD FRML MDRD: 108 ML/MIN/1.73SQ M
GLUCOSE P FAST SERPL-MCNC: 85 MG/DL (ref 65–99)
GLUCOSE UR STRIP-MCNC: NEGATIVE MG/DL
HBA1C MFR BLD: 6.3 % (ref 4.2–6.3)
HCG SERPL QL: NEGATIVE
HCT VFR BLD AUTO: 34 % (ref 34.8–46.1)
HDLC SERPL-MCNC: 53 MG/DL (ref 40–60)
HGB BLD-MCNC: 10.1 G/DL (ref 11.5–15.4)
HGB UR QL STRIP.AUTO: ABNORMAL
IMM GRANULOCYTES # BLD AUTO: 0.02 THOUSAND/UL (ref 0–0.2)
IMM GRANULOCYTES NFR BLD AUTO: 0 % (ref 0–2)
IRON SERPL-MCNC: 19 UG/DL (ref 50–170)
KETONES UR STRIP-MCNC: NEGATIVE MG/DL
LDLC SERPL CALC-MCNC: 129 MG/DL (ref 0–100)
LEUKOCYTE ESTERASE UR QL STRIP: NEGATIVE
LYMPHOCYTES # BLD AUTO: 2.11 THOUSANDS/ΜL (ref 0.6–4.47)
LYMPHOCYTES NFR BLD AUTO: 27 % (ref 14–44)
MCH RBC QN AUTO: 23.3 PG (ref 26.8–34.3)
MCHC RBC AUTO-ENTMCNC: 29.7 G/DL (ref 31.4–37.4)
MCV RBC AUTO: 79 FL (ref 82–98)
MONOCYTES # BLD AUTO: 0.59 THOUSAND/ΜL (ref 0.17–1.22)
MONOCYTES NFR BLD AUTO: 8 % (ref 4–12)
MUCOUS THREADS UR QL AUTO: ABNORMAL
NEUTROPHILS # BLD AUTO: 4.76 THOUSANDS/ΜL (ref 1.85–7.62)
NEUTS SEG NFR BLD AUTO: 62 % (ref 43–75)
NITRITE UR QL STRIP: NEGATIVE
NON-SQ EPI CELLS URNS QL MICRO: ABNORMAL /HPF
NONHDLC SERPL-MCNC: 150 MG/DL
NRBC BLD AUTO-RTO: 0 /100 WBCS
PH UR STRIP.AUTO: 5.5 [PH]
PLATELET # BLD AUTO: 469 THOUSANDS/UL (ref 149–390)
PMV BLD AUTO: 9.3 FL (ref 8.9–12.7)
POTASSIUM SERPL-SCNC: 2.8 MMOL/L (ref 3.5–5.3)
PROT SERPL-MCNC: 7.9 G/DL (ref 6.4–8.2)
PROT UR STRIP-MCNC: NEGATIVE MG/DL
RBC # BLD AUTO: 4.33 MILLION/UL (ref 3.81–5.12)
RBC #/AREA URNS AUTO: ABNORMAL /HPF
SODIUM SERPL-SCNC: 140 MMOL/L (ref 136–145)
SP GR UR STRIP.AUTO: 1.02 (ref 1–1.03)
TRIGL SERPL-MCNC: 104 MG/DL
TSH SERPL DL<=0.05 MIU/L-ACNC: 1.27 UIU/ML (ref 0.36–3.74)
UROBILINOGEN UR QL STRIP.AUTO: 0.2 E.U./DL
WBC # BLD AUTO: 7.72 THOUSAND/UL (ref 4.31–10.16)
WBC #/AREA URNS AUTO: ABNORMAL /HPF

## 2019-10-03 PROCEDURE — 84703 CHORIONIC GONADOTROPIN ASSAY: CPT

## 2019-10-03 PROCEDURE — 82728 ASSAY OF FERRITIN: CPT

## 2019-10-03 PROCEDURE — 83036 HEMOGLOBIN GLYCOSYLATED A1C: CPT

## 2019-10-03 PROCEDURE — 81001 URINALYSIS AUTO W/SCOPE: CPT | Performed by: PHYSICIAN ASSISTANT

## 2019-10-03 PROCEDURE — 85025 COMPLETE CBC W/AUTO DIFF WBC: CPT

## 2019-10-03 PROCEDURE — 80053 COMPREHEN METABOLIC PANEL: CPT

## 2019-10-03 PROCEDURE — 36415 COLL VENOUS BLD VENIPUNCTURE: CPT

## 2019-10-03 PROCEDURE — 83540 ASSAY OF IRON: CPT

## 2019-10-03 PROCEDURE — 80061 LIPID PANEL: CPT

## 2019-10-03 PROCEDURE — 84443 ASSAY THYROID STIM HORMONE: CPT

## 2019-10-08 ENCOUNTER — TELEPHONE (OUTPATIENT)
Dept: FAMILY MEDICINE CLINIC | Facility: CLINIC | Age: 39
End: 2019-10-08

## 2019-10-08 DIAGNOSIS — D50.0 IRON DEFICIENCY ANEMIA DUE TO CHRONIC BLOOD LOSS: ICD-10-CM

## 2019-10-08 DIAGNOSIS — R89.9 ABNORMAL LABORATORY TEST: Primary | ICD-10-CM

## 2019-10-08 RX ORDER — FERROUS SULFATE 325(65) MG
325 TABLET ORAL
Qty: 30 TABLET | Refills: 5 | Status: SHIPPED | OUTPATIENT
Start: 2019-10-08 | End: 2021-11-23

## 2019-10-08 NOTE — TELEPHONE ENCOUNTER
Spoke with pt regarding low CBC, ferritin, and iron levels  Advised to start ferrous sulfate 325mg po qd  Discussed eating foods high in iron such as green leafy vegetable,beans, etc  Also, K+ 2 8  She denies taking any OTC or prescription medications  Will recheck potassium and treat accordingly  HGB A1c- 6 3   advised following low carb diet and exercising  Advised to schedule a f/u to further discuss other labs

## 2019-10-09 ENCOUNTER — HOSPITAL ENCOUNTER (OUTPATIENT)
Dept: ULTRASOUND IMAGING | Facility: HOSPITAL | Age: 39
Discharge: HOME | End: 2019-10-09
Payer: COMMERCIAL

## 2019-10-09 ENCOUNTER — APPOINTMENT (OUTPATIENT)
Dept: LAB | Facility: CLINIC | Age: 39
End: 2019-10-09
Payer: COMMERCIAL

## 2019-10-09 DIAGNOSIS — R89.9 ABNORMAL LABORATORY TEST: ICD-10-CM

## 2019-10-09 DIAGNOSIS — N92.1 MENORRHAGIA WITH IRREGULAR CYCLE: ICD-10-CM

## 2019-10-09 LAB
ANION GAP SERPL CALCULATED.3IONS-SCNC: 8 MMOL/L (ref 4–13)
BUN SERPL-MCNC: 10 MG/DL (ref 5–25)
CALCIUM SERPL-MCNC: 7.9 MG/DL (ref 8.3–10.1)
CHLORIDE SERPL-SCNC: 102 MMOL/L (ref 100–108)
CO2 SERPL-SCNC: 26 MMOL/L (ref 21–32)
CREAT SERPL-MCNC: 0.79 MG/DL (ref 0.6–1.3)
GFR SERPL CREATININE-BSD FRML MDRD: 108 ML/MIN/1.73SQ M
GLUCOSE SERPL-MCNC: 80 MG/DL (ref 65–140)
POTASSIUM SERPL-SCNC: 3.1 MMOL/L (ref 3.5–5.3)
SODIUM SERPL-SCNC: 136 MMOL/L (ref 136–145)

## 2019-10-09 PROCEDURE — 76830 TRANSVAGINAL US NON-OB: CPT

## 2019-10-09 PROCEDURE — 36415 COLL VENOUS BLD VENIPUNCTURE: CPT

## 2019-10-09 PROCEDURE — 76856 US EXAM PELVIC COMPLETE: CPT

## 2019-10-09 PROCEDURE — 80048 BASIC METABOLIC PNL TOTAL CA: CPT

## 2019-10-10 DIAGNOSIS — E83.51 HYPOCALCEMIA: ICD-10-CM

## 2019-10-10 DIAGNOSIS — E87.6 HYPOKALEMIA: Primary | ICD-10-CM

## 2019-10-10 RX ORDER — POTASSIUM CHLORIDE 20 MEQ/1
20 TABLET, EXTENDED RELEASE ORAL DAILY
Qty: 3 TABLET | Refills: 0 | Status: SHIPPED | OUTPATIENT
Start: 2019-10-10 | End: 2019-10-30

## 2019-10-17 ENCOUNTER — TELEPHONE (OUTPATIENT)
Dept: FAMILY MEDICINE CLINIC | Facility: CLINIC | Age: 39
End: 2019-10-17

## 2019-10-17 DIAGNOSIS — D25.0 FIBROIDS, SUBMUCOSAL: Primary | ICD-10-CM

## 2019-10-17 NOTE — TELEPHONE ENCOUNTER
Spoke with pt regarding pelvic ultrasound results  Advised she will need to repeat pelvis ultrasound in 6-12 week- complex ovarian cyst finding per radiologist recommendations  Would like to be referred to gyn for uterine fibroids  Advised to schedule an appointment in 4-6 weeks

## 2019-10-18 ENCOUNTER — APPOINTMENT (OUTPATIENT)
Dept: LAB | Facility: CLINIC | Age: 39
End: 2019-10-18
Payer: COMMERCIAL

## 2019-10-18 DIAGNOSIS — E83.51 HYPOCALCEMIA: ICD-10-CM

## 2019-10-18 DIAGNOSIS — E87.6 HYPOKALEMIA: ICD-10-CM

## 2019-10-18 LAB
25(OH)D3 SERPL-MCNC: 8.6 NG/ML (ref 30–100)
ANION GAP SERPL CALCULATED.3IONS-SCNC: 9 MMOL/L (ref 4–13)
BUN SERPL-MCNC: 10 MG/DL (ref 5–25)
CALCIUM SERPL-MCNC: 8.4 MG/DL (ref 8.3–10.1)
CHLORIDE SERPL-SCNC: 105 MMOL/L (ref 100–108)
CO2 SERPL-SCNC: 24 MMOL/L (ref 21–32)
CREAT SERPL-MCNC: 0.95 MG/DL (ref 0.6–1.3)
GFR SERPL CREATININE-BSD FRML MDRD: 87 ML/MIN/1.73SQ M
GLUCOSE P FAST SERPL-MCNC: 77 MG/DL (ref 65–99)
MAGNESIUM SERPL-MCNC: 2.1 MG/DL (ref 1.6–2.6)
PHOSPHATE SERPL-MCNC: 2.8 MG/DL (ref 2.7–4.5)
POTASSIUM SERPL-SCNC: 3.4 MMOL/L (ref 3.5–5.3)
PTH-INTACT SERPL-MCNC: 191.3 PG/ML (ref 18.4–80.1)
SODIUM SERPL-SCNC: 138 MMOL/L (ref 136–145)

## 2019-10-18 PROCEDURE — 83970 ASSAY OF PARATHORMONE: CPT

## 2019-10-18 PROCEDURE — 83735 ASSAY OF MAGNESIUM: CPT

## 2019-10-18 PROCEDURE — 82306 VITAMIN D 25 HYDROXY: CPT

## 2019-10-18 PROCEDURE — 80048 BASIC METABOLIC PNL TOTAL CA: CPT

## 2019-10-18 PROCEDURE — 84100 ASSAY OF PHOSPHORUS: CPT

## 2019-10-18 PROCEDURE — 36415 COLL VENOUS BLD VENIPUNCTURE: CPT

## 2019-10-22 ENCOUNTER — TELEPHONE (OUTPATIENT)
Dept: FAMILY MEDICINE CLINIC | Facility: CLINIC | Age: 39
End: 2019-10-22

## 2019-10-22 DIAGNOSIS — E55.9 VITAMIN D DEFICIENCY: Primary | ICD-10-CM

## 2019-10-22 RX ORDER — ERGOCALCIFEROL 1.25 MG/1
50000 CAPSULE ORAL WEEKLY
Qty: 8 CAPSULE | Refills: 0 | Status: SHIPPED | OUTPATIENT
Start: 2019-10-22 | End: 2021-11-23

## 2019-10-22 NOTE — TELEPHONE ENCOUNTER
Spoke with pt discussed abnormal labs  Will start Vitamin D 50,000 units weekly x 8 weeks, then she's to start vitamin d 2000 units daily  PTH- elevated 191 3 Calcium wnl  Will refer to endo  Hypokalemia-  3 4 advised to eat foods enriched in K+ as discussed previously  Maintains scheduled appt

## 2019-10-23 DIAGNOSIS — R79.89 ELEVATED PTHRP LEVEL: Primary | ICD-10-CM

## 2019-10-23 NOTE — TELEPHONE ENCOUNTER
Spoke with patient, entered referral for Endocrinology, left message @ ext [phone] to c/b to schedule  Spoke with Sherrell, once confirmation is made for appointment, she will call patient

## 2019-10-24 NOTE — TELEPHONE ENCOUNTER
Checked system, looks like patient is scheduled to see endo in February 2020  Should we attempt to have her seen sooner? Have you returned her call?

## 2019-10-29 PROBLEM — D25.9 UTERINE LEIOMYOMA: Status: ACTIVE | Noted: 2019-10-29

## 2019-10-29 PROBLEM — N83.201 RIGHT OVARIAN CYST: Status: ACTIVE | Noted: 2019-10-29

## 2019-10-29 NOTE — PROGRESS NOTES
Monika Luna is a 35 y o  female who presents for annual well woman exam  Last pap smear 18- NILM  HR HPV negative  Periods are regular every 14 days, lasting 7-10 days  No intermenstrual bleeding, spotting, or discharge  Patient is currently being treated for anemia with iron twice daily  Sister diagnosed with uterine cancer around age 50  Reviewed labs-TSH, iron and CBC  Reviewed pelvic ultrasound significant for complex right ovarian cyst and uterine fibroids  Reviewed recommendation for repeat ultrasound in 6-12 weeks  Current contraception: Inconsistent condom use  History of abnormal Pap smear: no  Family history of uterine or ovarian cancer: no  Regular self breast exam: no  History of abnormal mammogram:  Mammograms scheduled  Family history of breast cancer: no  History of abnormal lipids: no  Gardasil: No    Menstrual History:  OB History        6    Para   3    Term                AB   3    Living   3       SAB        TAB        Ectopic        Multiple        Live Births                    Menarche age: 14  Patient's last menstrual period was 10/13/2019 (approximate)  Period Cycle (Days): (every 2 weeks)  Period Duration (Days): 7-10  Menstrual Flow: Heavy  Menstrual Control Change Freq (Hours): heavy- changing pad/ tampon every 2 hours -since      The following portions of the patient's history were reviewed and updated as appropriate: allergies, current medications, past family history, past medical history, past social history, past surgical history and problem list      Review of Systems  Pertinent items are noted in HPI  Objective      /89 (BP Location: Left arm, Patient Position: Sitting, Cuff Size: Large)   Pulse 89   Ht 5' 6" (1 676 m)   Wt 105 kg (232 lb)   LMP 10/13/2019 (Approximate)   Breastfeeding?  No   BMI 37 45 kg/m²     General:   alert, appears stated age and cooperative   Heart: regular rate and rhythm, S1, S2 normal, no murmur, click, rub or gallop   Lungs: clear to auscultation bilaterally   Abdomen: soft, non-tender, without masses or organomegaly, nondistended and normal bowel sounds   Vulva: normal, Bartholin's, Urethra, [place]'s normal   Vagina: normal mucosa, normal discharge, no palpable nodules   Cervix: no bleeding following Pap, no cervical motion tenderness and no lesions   Uterus: normal size, non-tender, normal shape and consistency   Adnexa: normal adnexa and no mass, fullness, tenderness   Breast: non-tender, no palpable masses, no nipple discharge, no skin changes bilaterally           Assessment      @[email or web address]@   Plan      All questions answered  Breast self exam technique reviewed and patient encouraged to perform self-exam monthly  Contraception: Reviewed options to control menstrual cycle with progesterone only options given patient's history of migraines and briefly reviewed surgical options  Reviewed with patient once results of EMB can discuss further treatment as she desires     Diagnosis explained in detail, including differential   Dietary diary  Discussed healthy lifestyle modifications  Educational material distributed  Follow up in 2-3 Weeks for endometrial biopsy results and to develop plan of care/1 year for annual exam   Follow up as needed  Breast awareness reviewed   Right ovarian cyst with recommendations for follow-up ultrasound in 6-12 weeks    Pelvic ultrasound ordered  Encouraged to keep appointment for mammogram  Will call with results

## 2019-10-29 NOTE — PATIENT INSTRUCTIONS
Human Papillomavirus Vaccine (By injection)   Human Papillomavirus Vaccine (HUE-man pap-ah-CLAUDE-jocelin-VYE-kunal VAX-een)  Helps prevent genital warts and cancer of the anus, cervix, vagina, or vulva, which may be caused by human papillomavirus (HPV)  Brand Name(s): Cervarix, Gardasil, Gardasil 9   There may be other brand names for this medicine  When This Medicine Should Not Be Used: This vaccine is not right for everyone  You should not receive it if you had an allergic reaction to human papillomavirus vaccine or to yeast   How to Use This Medicine:   Injectable  · Your doctor will prescribe your exact dose and tell you how often it should be given  This medicine is given as a shot into one of your muscles  · A nurse or other health provider will give you this medicine  · This vaccine must be given as 2 or 3 doses based on the brand  · Read and follow the patient instructions that come with this medicine  Talk to your doctor or pharmacist if you have any questions  · Missed dose: This vaccine needs to be given on a fixed schedule  If you miss your scheduled shot, call your doctor to make another appointment as soon as possible  Drugs and Foods to Avoid:   Ask your doctor or pharmacist before using any other medicine, including over-the-counter medicines, vitamins, and herbal products  · Some medicines can affect how this vaccine works  Tell your doctor if you are using any treatment that weakens the immune system, such as cancer medicine, radiation treatment, or a steroid  Warnings While Using This Medicine:   · Tell your doctor if you are pregnant or breastfeeding, or if you have a weak immune system or are allergic to latex  · This vaccine will not protect you against sexually transmitted diseases that are not caused by HPV  · You still need to see your doctor for routine screening tests for anal or cervical cancer even after you receive this vaccine    · You may feel faint, lightheaded, or dizzy right after you receive this vaccine  Your doctor may ask you to wait 15 minutes before standing  Possible Side Effects While Using This Medicine:   Call your doctor right away if you notice any of these side effects:  · Allergic reaction: Itching or hives, swelling in your face or hands, swelling or tingling in your mouth or throat, chest tightness, trouble breathing  · Lightheadedness, dizziness, or fainting  If you notice these less serious side effects, talk with your doctor:   · Headache, tiredness  · Mild fever  · Pain, redness, itching, or swelling where the shot is given  If you notice other side effects that you think are caused by this medicine, tell your doctor  Call your doctor for medical advice about side effects  You may report side effects to FDA at 8-487-ONS-0469  © 2017 [address] Information is for End User's use only and may not be sold, redistributed or otherwise used for commercial purposes  The above information is an  only  It is not intended as medical advice for individual conditions or treatments  Talk to your doctor, nurse or pharmacist before following any medical regimen to see if it is safe and effective for you  Mammogram   WHAT YOU NEED TO KNOW:   What do I need to know about a mammogram?  A mammogram is an x-ray of your breasts to screen for breast cancer  Experts recommend mammograms every 2 years starting at age 48 years  You may need a mammogram at age 52 years or younger if you have an increased risk for breast cancer  Talk to your healthcare provider about when you should start having mammograms and how often you need them  How do I prepare for a mammogram?   · Do not use deodorant, powder, lotion, or perfume  These products may cause particles to appear on your mammogram      · Wear a 2-piece outfit  · If your breasts are tender before your monthly period, do not have a mammogram during this time   Schedule your mammogram to be done 1 week after your period ends  · If you are breastfeeding, express as much milk as possible before the mammogram     · Bring a list of the dates and places of your past mammograms and other breast tests or treatments  What will happen during a mammogram?  A top view and a side view x-ray are usually done for each breast  Tell healthcare providers if you have breast implants or breast problems before you have your mammogram  You may need extra x-rays of each breast   · You will be given a hospital gown  Take off your clothes from the waist up  Wear the hospital gown so that it opens in the front  · You will sit or stand next to a small x-ray machine  The healthcare provider will help you place one of your breasts on the x-ray plate  Your arm and breast will be moved until your breast is in the correct position  · Your breast will be gently pressed between 2 plastic plates for a few seconds while the x-ray is taken  This may be uncomfortable  · You will be asked to hold your breath while the x-ray is taken  Another x-ray will be taken of the same breast after the position of the x-ray machine has been changed  · Your other breast will be x-rayed the same way  What will happen after my mammogram?  Your breasts may feel tender for a short while after the mammogram  You may resume your regular activities  Ask your healthcare provider when you should receive the results of your mammogram   What are the risks of a mammogram?  You will be exposed to a small amount of radiation  Some breast cancers may not show up on mammograms  When should I contact my healthcare provider? · You cannot make your appointment on time  · You do not receive your results when expected  · You have questions or concerns about the mammogram   CARE AGREEMENT:   You have the right to help plan your care  Learn about your health condition and how it may be treated   Discuss treatment options with your caregivers to decide what care you want to receive  You always have the right to refuse treatment  The above information is an  only  It is not intended as medical advice for individual conditions or treatments  Talk to your doctor, nurse or pharmacist before following any medical regimen to see if it is safe and effective for you  © 2017 [address] Information is for End User's use only and may not be sold, redistributed or otherwise used for commercial purposes  All illustrations and images included in CareNotes® are the copyrighted property of [employer]  or [employer]  Breast Self Exam for Women   WHAT YOU NEED TO KNOW:   What is a breast self-exam (BSE)? A BSE is a way to check your breasts for lumps and other changes  Regular BSEs can help you know how your breasts normally look and feel  Most breast lumps or changes are not cancer, but you should always have them checked by a healthcare provider  Your healthcare provider can also watch you do a BSE and can tell you if you are doing your BSE correctly  Why should I do a BSE? Breast cancer is the most common type of cancer in women  Even if you have mammograms, you may still want to do a BSE regularly  If you know how your breasts normally feel and look, it may help you know when to contact your healthcare provider  Mammograms can miss some cancers  You may find a lump during a BSE that did not show up on your mammogram   When should I do a BSE? Erik your calendar to help you remember to do BSE on a regular schedule  One easy way to remember to do a BSE is to do the exam on the same day of each month  If you have periods, you may want to do your BSE 1 week after your period ends  This is the time when your breasts may be the least swollen, lumpy, or tender  You can do regular BSEs even if you are breastfeeding or have breast implants  How should I do a BSE? · Look at your breasts in a mirror    Look at the size and shape of each breast and nipple  Check for swelling, lumps, dimpling, scaly skin, or other skin changes  Look for nipple changes, such as a nipple that is painful or beginning to pull inward  Gently squeeze both nipples and check to see if fluid (that is not breast milk) comes out of them  If you find any of these or other breast changes, contact your healthcare provider  Check your breasts while you sit or  the following 3 positions:    [facility] your arms down at your sides  ¨ Raise your hands and join them behind your head  ¨ Put firm pressure with your hands on your hips  Bend slightly forward while you look at your breasts in the mirror  · Lie down and feel your breasts  When you lie down, your breast tissue spreads out evenly over your chest  This makes it easier for you to feel for lumps and anything that may not be normal for your breasts  Do a BSE on one breast at a time  ¨ Place a small pillow or towel under your left shoulder  Put your left arm behind your head  ¨ Use the 3 middle fingers of your right hand  Use your fingertip pads, on the top of your fingers  Your fingertip pad is the most sensitive part of your finger  ¨ Use small circles to feel your breast tissue  Use your fingertip pads to make dime-sized, overlapping circles on your breast and armpits  Use light, medium, and firm pressure  First, press lightly  Second, press with medium pressure to feel a little deeper into the breast  Last, use firm pressure to feel deep within your breast     ¨ Examine your entire breast area  Examine the breast area from above the breast to below the breast where you feel only ribs  Make small circles with your fingertips, starting in the middle of your armpit  Make circles going up and down the breast area  Continue toward your breast and all the way across it  Examine the area from your armpit all the way over to the middle of your chest (breastbone)   Stop at the middle of your chest     ¨ Move the pillow or towel to your right shoulder, and put your right arm behind your head  Use the 3 fingertip pads of your left hand, and repeat the above steps to do a BSE on your right breast        What else can I do to check for breast problems or cancer? Some experts suggest that women 36years of age or older should have a mammogram every year  Other experts suggest that women between the ages of 48and 76years old should have a mammogram every 2 years  Talk to your healthcare provider about when you should have a mammogram   When should I contact my healthcare provider? · You find any lumps or changes in your breasts  · You have breast pain or fluid coming from your nipples  · You have questions or concerns or concerns about your condition or care  CARE AGREEMENT:   You have the right to help plan your care  Learn about your health condition and how it may be treated  Discuss treatment options with your caregivers to decide what care you want to receive  You always have the right to refuse treatment  The above information is an  only  It is not intended as medical advice for individual conditions or treatments  Talk to your doctor, nurse or pharmacist before following any medical regimen to see if it is safe and effective for you  © 2017 26[address] [facility] Information is for End User's use only and may not be sold, redistributed or otherwise used for commercial purposes  All illustrations and images included in CareNotes® are the copyrighted property of [employer]  or Elliott Og  Pap Smear   GENERAL INFORMATION:   What is a Pap smear? A Pap smear, or Pap test, is a procedure to check your cervix for abnormal cells  The cervix is the narrow opening at the bottom of your uterus  The cervix meets the top part of the vagina  How do I prepare for a Pap smear? The best time to schedule the test is right after your period stops   Do not have a Pap smear during your monthly period  Do not have intercourse or put anything in your vagina for 24 hours before your test    What will happen during a Pap smear? · You will lie on your back and place your feet on footrests called stirrups  Your caregiver will gently insert a device called a speculum into your vagina  The speculum is used to spread the walls of your vagina so he can see your cervix  He will use a thin brush or cotton swab to collect cells from the inside of your cervix  · Your caregiver will also collect cells from the surface of your cervix with a plastic or wooden tool called a spatula  He may also gently scrape the upper part of your vagina for a sample  The samples are placed in a container with liquid or on a glass slide  They are sent to a lab and examined for abnormal cells  How often do I need a Pap smear? Pap smears are usually done every 1 to 3 years  You may need a Pap smear more often if you have any of the following:  · Positive test result for the human papillomavirus (HPV)    · Cervical intraepithelial neoplasm or cervical cancer    · HIV    · A weak immune system    · Exposure to diethylstilbestrol (DAVID) medicine when your mother was pregnant with you  CARE AGREEMENT:   You have the right to help plan your care  Learn about your health condition and how it may be treated  Discuss treatment options with your caregivers to decide what care you want to receive  You always have the right to refuse treatment  The above information is an  only  It is not intended as medical advice for individual conditions or treatments  Talk to your doctor, nurse or pharmacist before following any medical regimen to see if it is safe and effective for you  © 2014 [address] is for End User's use only and may not be sold, redistributed or otherwise used for commercial purposes   All illustrations and images included in CareNotes® are the copyrighted property of [employer]  or Elliott Og  Levonorgestrel (Into the uterus)   Levonorgestrel (nhl-gbk-hdq-SHERIDAN-trel)  Prevents pregnancy and treats heavy menstrual bleeding  This is an intrauterine device (IUD), which is a reversible form of birth control  This IUD slowly releases levonorgestrel, a hormone  Brand Name(s): Delight Agrawal, Mirena, [place]   There may be other brand names for this medicine  When This Medicine Should Not Be Used: This device is not right for everyone  Do not use it if you had an allergic reaction to levonorgestrel, or you are pregnant  How to Use This Medicine:   Device  · The IUD is usually inserted by your doctor during your monthly period  You will need to see your doctor 4 to 6 weeks after the IUD is placed and then once a year  · Your IUD has a string or "tail" that is made of plastic thread  About one or two inches of this string hangs into your vagina  You cannot see this string, and it will not cause problems when you have sex  Check your IUD after each monthly period  You may not be protected against pregnancy if you cannot feel the string or if you feel plastic  Do the following to check the placement of your IUD:  [facility] your hands with soap and warm water  Dry them with a clean towel  ¨ Bend your knees and squat low to the ground  ¨ Gently put your index finger high inside your vagina  The cervix is at the top of the vagina  Find the IUD string coming from your cervix  Never pull on the string  You should not be able to feel the plastic of the IUD itself  Wash your hands after you are done checking your IUD string  · Your doctor will need to replace your IUD after 3 years for [facility] or [place], or after 5 years for [facility] or [facility]  You will also need to have it replaced if it comes out of your uterus    Drugs and Foods to Avoid:   Ask your doctor or pharmacist before using any other medicine, including over-the-counter medicines, vitamins, and herbal products  · Some medicines can affect how this device works  Tell your doctor if you are using a blood thinner (including warfarin)  Warnings While Using This Medicine:   · Tell your doctor if you are breastfeeding, or you have had a baby, miscarriage, or  in the past 3 months  Tell your doctor if you have liver disease (including tumor or cancer), breast cancer, heart or blood circulation problems, including a history of heart valve problems, heart disease, blood clotting problems, stroke, heart attack, or high blood pressure  Tell your doctor if you have problems with your immune system or have had surgery on your female organs (especially fallopian tubes)  · Tell your doctor if you have had any problems, infections, or other conditions that affected your reproductive system  There are many problems that could make an IUD a bad choice for you, including if you have fibroids, unexplained bleeding, a uterus that has an unusual shape, a recent infection, pelvic inflammatory disease, abnormal Pap test, ectopic pregnancy, cancer or suspected cancer, or an existing IUD  · There is a small chance that you could get pregnant when using an IUD, just as there is with any birth control  If you get pregnant, your doctor may remove your IUD to lower the risk of miscarriage or other problems  · This medicine may cause the following problems:  ¨ Increased risk of ectopic pregnancy (pregnancy outside the uterus)  ¨ Increased risk of a serious infection called pelvic inflammatory disease (PID)  ¨ Increased risk for ovarian cysts  ¨ Perforation (hole in the wall of your uterus), which can damage other organs  · You might have some spotting and cramping during the first weeks after the IUD has been inserted  These symptoms should decrease or go away within a few weeks up to 6 months  · You could have less bleeding or even stop having periods by the end of the first year   Call your doctor if you have a change from the regular bleeding pattern after you have had your IUD for awhile, such as more bleeding or if you miss a period (and you were having periods even with your IUD)  · An IUD can slip partly or all of the way out of your uterus  If this happens, use condoms or another form of birth control, and call your doctor right away  · This IUD will not protect you from HIV/AIDS, herpes, or other sexually transmitted diseases  · If you have the Regan [place] or Placido Soliz, tell your healthcare provider before you have an MRI test   Possible Side Effects While Using This Medicine:   Call your doctor right away if you notice any of these side effects:  · Allergic reaction: Itching or hives, swelling in your face or hands, swelling or tingling in your mouth or throat, chest tightness, trouble breathing  · Chest pain, problems with speech or walking, numbness or weakness in your arm or leg or on one side of your body  · Heavy bleeding from your vagina  · Pain during sex, or if your partner feels the hard plastic of the IUD during sex  · Severe headache, vision changes  · Stomach or pelvic pain, tenderness, or cramping that is sudden or severe  · Vaginal discharge has a bad smell, fever, chills, sores on your genitals  · Yellow skin or eyes  If you notice these less serious side effects, talk with your doctor:   · Acne or other skin changes  · Breast pain  · Change in bleeding pattern after the first few months  · Dizziness or lightheadedness after IUD is placed  · Mild itching around your vagina and genitals  If you notice other side effects that you think are caused by this medicine, tell your doctor  Call your doctor for medical advice about side effects  You may report side effects to FDA at 6-166-FDA-9461  © 2017 [address] Information is for End User's use only and may not be sold, redistributed or otherwise used for commercial purposes  The above information is an  only   It is not intended as medical advice for individual conditions or treatments  Talk to your doctor, nurse or pharmacist before following any medical regimen to see if it is safe and effective for you  Medroxyprogesterone (By injection)   Medroxyprogesterone (hw-eeei-uk-proe-SHERIDAN-ter-one)  Prevents pregnancy  Also treats endometriosis and is used with other medicines to help relieve symptoms of cancer, including uterine or kidney cancer  Brand Name(s): Depo-Provera, Depo-Provera Contraceptive, Depo-SubQ Provera 104   There may be other brand names for this medicine  When This Medicine Should Not Be Used: This medicine is not right for everyone  You should not receive it if you had an allergic reaction to medroxyprogesterone or if you have a history of breast cancer or blood clots (including heart attack or stroke)  In most cases, you should not use this medicine while you are pregnant  How to Use This Medicine:   Injectable  · A nurse or other health provider will give you this medicine  This medicine is given as a shot into a muscle or just under the skin  · Your exact treatment schedule depends on the reason you are using this medicine  You doctor will explain your personal schedule  ¨ For treatment of cancer symptoms, you may start with a shot once per week  You may need fewer shots as your treatment goes forward  ¨ For birth control or endometriosis, you will need a shot every 3 months (13 weeks)  Read and follow the patient instructions that come with this medicine  Talk to your doctor or pharmacist if you have any questions  ¨ You might need to have the first shot during the first 5 days of your normal menstrual period, to make sure you are not pregnant  If you have just had a baby, you may receive a shot 5 days after birth if you are not breastfeeding or 6 weeks after birth if you are breastfeeding  · Missed dose: You must receive a shot every 3 months if you want to prevent pregnancy   Talk to your doctor or pharmacist if you do not receive your medicine on time, because you may need another form of birth control  Drugs and Foods to Avoid:   Ask your doctor or pharmacist before using any other medicine, including over-the-counter medicines, vitamins, and herbal products  · Some foods and medicines can affect how medroxyprogesterone works  Tell your doctor if you are using any of the following:  ¨ Aminoglutethimide, bosentan, carbamazepine, felbamate, griseofulvin, nefazodone, oxcarbazepine, phenobarbital, phenytoin, rifabutin, rifampin, rifapentine, Valdo's wort, topiramate  ¨ Medicine to treat an infection (including clarithromycin, itraconazole, ketoconazole, telithromycin, voriconazole)  ¨ Medicine to treat HIV/AIDS (including atazanavir, indinavir, nelfinavir, ritonavir, saquinavir)  Warnings While Using This Medicine:   · Tell your doctor right away if you think you have become pregnant  · Tell your doctor if you are breastfeeding or if you have liver disease, kidney disease, asthma, diabetes, heart disease, seizures, migraine headaches, an eating disorder, osteoporosis, or a history of depression  Tell your doctor if you smoke  · This medicine may cause the following problems:  ¨ Blood clots, which could lead to stroke, heart attack, or other serious problems  ¨ Possible increased risk of breast cancer  ¨ Weak or thin bones, especially with long-term use  · You should not use this medicine for long-term birth control unless you cannot use any other form of birth control  · This medicine will not protect you from HIV/AIDS or other sexually transmitted diseases  · Tell any doctor or dentist who treats you that you are using this medicine  This medicine may affect certain medical test results  · Your doctor will check your progress and the effects of this medicine at regular visits  Keep all appointments    Possible Side Effects While Using This Medicine:   Call your doctor right away if you notice any of these side effects:  · Allergic reaction: Itching or hives, swelling in your face or hands, swelling or tingling in your mouth or throat, chest tightness, trouble breathing  · Chest pain, trouble breathing, or coughing up blood  · Dark urine or pale stools, nausea, vomiting, loss of appetite, stomach pain, yellow skin or eyes  · Heavy or nonstop vaginal bleeding  · Loss of vision, double vision  · Numbness or weakness on one side of your body, sudden or severe headache, problems with vision, speech, or walking  · Severe stomach pain or cramps  If you notice these less serious side effects, talk with your doctor:   · Headache  · Light or missed monthly periods, spotting between periods  · Nervousness or dizziness  · Pain, redness, burning, swelling, or a lump under your skin where the shot was given  · Weight gain  If you notice other side effects that you think are caused by this medicine, tell your doctor  Call your doctor for medical advice about side effects  You may report side effects to FDA at 8-186-FDA-7925  © 2017 [employer] [employer]  Information is for End User's use only and may not be sold, redistributed or otherwise used for commercial purposes  The above information is an  only  It is not intended as medical advice for individual conditions or treatments  Talk to your doctor, nurse or pharmacist before following any medical regimen to see if it is safe and effective for you  Endometrial Biopsy   WHAT YOU NEED TO KNOW:   Endometrial biopsy is a procedure to remove a tissue sample from the lining of your uterus  This procedure is done through your vagina  DISCHARGE INSTRUCTIONS:   Medicines: The following medicines may be ordered for you:  · NSAIDs , such as ibuprofen, help decrease swelling, pain, and fever  This medicine is available with or without a doctor's order  NSAIDs can cause stomach bleeding or kidney problems in certain people   If you take blood thinner medicine, always ask your healthcare provider if NSAIDs are safe for you  Always read the medicine label and follow directions  · Take your medicine as directed  Contact your healthcare provider if you think your medicine is not helping or if you have side effects  Tell him or her if you are allergic to any medicine  Keep a list of the medicines, vitamins, and herbs you take  Include the amounts, and when and why you take them  Bring the list or the pill bottles to follow-up visits  Carry your medicine list with you in case of an emergency  Follow up with your healthcare provider or gynecologist as directed: You may need to return for more tests  Write down your questions so you remember to ask them during your visits  Self-care:   · You may have mild pain, cramping, or spotting for a few days after your procedure  · Avoid sex, douching, or tampon use for 10 to 14 days or as directed by your healthcare provider  Contact your healthcare provider or gynecologist if:   · You have a fever  · You have pain or cramping lasts longer than a few days  · You have white or yellow vaginal discharge  · You have more vaginal bleeding than you were told to expect  · You have questions or concerns about your condition or care  Seek care immediately or call 911 if:   · You have severe pain that does not go away after you take pain medicine  © 2017 [employer] Information is for End User's use only and may not be sold, redistributed or otherwise used for commercial purposes  All illustrations and images included in CareNotes® are the copyrighted property of A D A M [employer] Inc  or Elliott Og  The above information is an  only  It is not intended as medical advice for individual conditions or treatments  Talk to your doctor, nurse or pharmacist before following any medical regimen to see if it is safe and effective for you

## 2019-10-30 ENCOUNTER — OFFICE VISIT (OUTPATIENT)
Dept: OBGYN CLINIC | Facility: CLINIC | Age: 39
End: 2019-10-30

## 2019-10-30 VITALS
WEIGHT: 232 LBS | HEIGHT: 66 IN | BODY MASS INDEX: 37.28 KG/M2 | DIASTOLIC BLOOD PRESSURE: 89 MMHG | SYSTOLIC BLOOD PRESSURE: 127 MMHG | HEART RATE: 89 BPM

## 2019-10-30 DIAGNOSIS — D25.9 UTERINE LEIOMYOMA, UNSPECIFIED LOCATION: ICD-10-CM

## 2019-10-30 DIAGNOSIS — N83.201 RIGHT OVARIAN CYST: ICD-10-CM

## 2019-10-30 DIAGNOSIS — D25.0 FIBROIDS, SUBMUCOSAL: ICD-10-CM

## 2019-10-30 DIAGNOSIS — Z01.419 WOMEN'S ANNUAL ROUTINE GYNECOLOGICAL EXAMINATION: Primary | ICD-10-CM

## 2019-10-30 DIAGNOSIS — N92.1 MENORRHAGIA WITH IRREGULAR CYCLE: ICD-10-CM

## 2019-10-30 PROCEDURE — 88305 TISSUE EXAM BY PATHOLOGIST: CPT | Performed by: PATHOLOGY

## 2019-10-30 PROCEDURE — 99386 PREV VISIT NEW AGE 40-64: CPT | Performed by: NURSE PRACTITIONER

## 2019-10-30 PROCEDURE — 58100 BIOPSY OF UTERUS LINING: CPT | Performed by: NURSE PRACTITIONER

## 2019-10-30 NOTE — PROGRESS NOTES
Endometrial biopsy  Date/Time: 10/30/2019 9:24 AM  Performed by: SHANEL Sapp  Authorized by: SHANEL Sapp     Consent:     Consent obtained:  Verbal and written    Consent given by:  Patient    Procedural risks discussed:  Bleeding, failure rate, infection and repeat procedure    Patient questions answered: yes      Patient agrees, verbalizes understanding, and wants to proceed: yes      Educational handouts given: yes      Instructions and paperwork completed: yes    Universal protocol:     Patient states understanding of procedure being performed: yes      Relevant documents present and verified: yes      Test results available and properly labeled: na       Imaging studies available: yes      Required blood products, implants, devices, and special equipment available: yes      Site marked: no    Indication:     Indications: Other disorder of menstruation and other abnormal bleeding from female genital tract      Indications comment:  And family history of endometrial cancer   Pre-procedure:     Pre-procedure timeout performed: yes      Anesthesia premedication: none  Procedure:     Procedure: endometrial biopsy with Pipelle      A bivalve speculum was placed in the vagina: yes      Cervix cleaned and prepped: yes      A paracervical block was performed: no      An intracervical block was performed: no      Local anesthetic: na  The cervix was dilated: no      Uterus sounded: yes      Uterus sound depth (cm):  10    Curettes used:  1    Specimen collected: specimen collected and sent to pathology      Patient tolerated procedure well with no complications: yes    Findings:     Uterus size:  9-10 weeks    Cervix: normal      Adnexa: normal    Comments:      Reviewed with patient risks, benefits and alternatives to endometrial biopsy  Reviewed with patient bleeding may be related solely to uterine fibroids    However given increased risk factors obesity, unable to gain menstrual control with OCPs and family history would recommend endometrial biopsy  Procedure reviewed  Patient verbalized understanding and would like sampling at today's visit  UPT negative in office today  Tolerated procedure well    Signs and symptoms to report reviewed  RTO 2-3 weeks to review results and develop plan of care

## 2019-11-05 ENCOUNTER — TELEPHONE (OUTPATIENT)
Dept: OBGYN CLINIC | Facility: CLINIC | Age: 39
End: 2019-11-05

## 2019-11-05 NOTE — TELEPHONE ENCOUNTER
----- Message from [employer] [address] sent at 11/4/2019  3:29 PM EST -----  Please call patient EMB resulted proliferative endometrium  This is normal for mensurating women  Please have her make an appointment if she desires any treatment for AUB     Thank you

## 2019-11-07 ENCOUNTER — TELEPHONE (OUTPATIENT)
Dept: OBGYN CLINIC | Facility: CLINIC | Age: 39
End: 2019-11-07

## 2019-11-13 ENCOUNTER — PROCEDURE VISIT (OUTPATIENT)
Dept: OBGYN CLINIC | Facility: CLINIC | Age: 39
End: 2019-11-13

## 2019-11-13 VITALS
WEIGHT: 229 LBS | SYSTOLIC BLOOD PRESSURE: 138 MMHG | BODY MASS INDEX: 36.8 KG/M2 | DIASTOLIC BLOOD PRESSURE: 88 MMHG | HEART RATE: 88 BPM | HEIGHT: 66 IN

## 2019-11-13 DIAGNOSIS — Z30.013 ENCOUNTER FOR INITIAL PRESCRIPTION OF INJECTABLE CONTRACEPTIVE: ICD-10-CM

## 2019-11-13 DIAGNOSIS — N93.9 ABNORMAL UTERINE BLEEDING (AUB): Primary | ICD-10-CM

## 2019-11-13 LAB — SL AMB POCT URINE HCG: NORMAL

## 2019-11-13 PROCEDURE — 81025 URINE PREGNANCY TEST: CPT | Performed by: NURSE PRACTITIONER

## 2019-11-13 PROCEDURE — 96372 THER/PROPH/DIAG INJ SC/IM: CPT | Performed by: NURSE PRACTITIONER

## 2019-11-13 PROCEDURE — 99213 OFFICE O/P EST LOW 20 MIN: CPT | Performed by: NURSE PRACTITIONER

## 2019-11-13 RX ORDER — MEDROXYPROGESTERONE ACETATE 150 MG/ML
150 INJECTION, SUSPENSION INTRAMUSCULAR
Status: DISCONTINUED | OUTPATIENT
Start: 2019-11-13 | End: 2021-12-07

## 2019-11-13 RX ORDER — MEDROXYPROGESTERONE ACETATE 150 MG/ML
150 INJECTION, SUSPENSION INTRAMUSCULAR
Qty: 1 ML | Refills: 3 | Status: SHIPPED | OUTPATIENT
Start: 2019-11-13 | End: 2021-11-23

## 2019-11-13 RX ADMIN — MEDROXYPROGESTERONE ACETATE 150 MG: 150 INJECTION, SUSPENSION INTRAMUSCULAR at 13:47

## 2019-11-13 NOTE — PROGRESS NOTES
Assessment/Plan:      Diagnoses and all orders for this visit:    Abnormal uterine bleeding (AUB)  -     medroxyPROGESTERone (DEPO-PROVERA) 150 mg/mL injection; Inject 1 mL (150 mg total) into a muscle every 3 (three) months    Encounter for initial prescription of injectable contraceptive  -     medroxyPROGESTERone (DEPO-PROVERA) 150 mg/mL injection; Inject 1 mL (150 mg total) into a muscle every 3 (three) months      -reviewed options to treat AUB including nonhormonal, hormonal and surgical   Patient would like to start Depo-Provera at today's visit  -UPT negative in office today  -reviewed with patient Depo-Provera injections are every 12 weeks, common side effect of irregular vaginal bleeding, breast tenderness and weight gain 5 lb per year  Patient verbalizes understanding  Written information provided    RTO 3 months for depo injection     Subjective:     Patient ID: Heron Ga is a 35 y o  female  HPI  here to discuss endometrial biopsy results and plan of care for abnormal uterine bleeding  Reviewed endometrial biopsy results-benign proliferative endometrium  Menses are every 14 days lasting 7-10 days  Sister diagnosed uterine cancer around age 50  Had labs drawn TSH, iron and CBC 10/3/19  Had pelvic ultrasound significant for complex right ovarian cyst and uterine fibroids  Has follow-up ultrasound ordered for 6-12 weeks  Last Pap smear 18- NILM  Has mammogram scheduled    Review of Systems   Constitutional: Negative for chills and fever  Respiratory: Negative  Cardiovascular: Negative  Gastrointestinal: Negative  Genitourinary: Positive for menstrual problem  Negative for decreased urine volume, dysuria, frequency, genital sores, pelvic pain, urgency, vaginal bleeding, vaginal discharge and vaginal pain  Objective:     Physical Exam   Constitutional: She is oriented to person, place, and time  She appears well-developed and well-nourished     Neurological: She is alert and oriented to person, place, and time  Psychiatric: She has a normal mood and affect   Her behavior is normal  Thought content normal

## 2019-11-13 NOTE — PROGRESS NOTES
Depo-Provera      [x]   Patient provided box yes   o 3 Refills remain   Last  Annual Date: 10/30/19  Roula Garcia Last Depo date: 11/13/19   Side effects: no   HCG: yes  o if applicable: negative   Given by: cf   Site: right deltoid  (pt may have her next dep[facility] inj [facility])     Calcium supplement daily teaching, condoms for 2 weeks following first injection dose

## 2019-11-13 NOTE — PATIENT INSTRUCTIONS
Medroxyprogesterone/Estradiol (By injection)   Estradiol (es-tra-DYE-ol), Medroxyprogesterone (kd-vwvm-no-proe-SHERIDAN-ter-one)  Prevents pregnancy  This medicine is a form of birth control (contraception)  Brand Name(s):   There may be other brand names for this medicine  When This Medicine Should Not Be Used: You should not receive this medicine if you have had an allergic reaction to medroxyprogesterone or estradiol, if you may be pregnant, or if you have ever had blot-clotting problems, coronary artery disease, stroke, certain types of cancer, liver disease, or vaginal bleeding that has not been checked by your doctor  Under certain conditions, you should not receive this medicine if you have diabetes, high blood pressure, severe headaches, problems with your heart valves, or if you smoke  How to Use This Medicine:   Injectable  · Your doctor will prescribe your exact dose and tell you how often it should be given  This medicine is given as a shot into one of your muscles  · A nurse or other trained health professional will give you this medicine  · Your first shot must be given during the first 5 days of your menstrual cycle, or at least 4 weeks after having a baby  If a dose is missed:   · This medicine needs to be given on a regular schedule, every 28 to 30 days, in order for it to work properly  If you go longer than 33 days without a shot, ask your doctor for instructions  · If you miss 1 period and have not had your shots on schedule, call your doctor  If you miss 2 periods in a row, call your doctor, even if you have gotten your shots on time  Drugs and Foods to Avoid:   Ask your doctor or pharmacist before using any other medicine, including over-the-counter medicines, vitamins, and herbal products    · Make sure your doctor knows if you are also using medicine for seizures (such as [place]), antibiotics (such as ampicillin, tetracycline, or griseofulvin (Grifulvin®)), St  Mykel's Wort, rifampin (Rifadin®), phenylbutazone, cyclosporine (Sandimmune®, Neoral®), prednisolone, theophylline, Tylenol®, temazepam (Restoril®), morphine, or clofibric acid  Warnings While Using This Medicine:   · Make sure your doctor knows if you are breastfeeding, or if you have diabetes, blood vessel disorders, high cholesterol, heart disease, liver disease, kidney disease, gall bladder problems, family history of breast cancer, or a history of depression  · This medicine may increase or decrease the length or heaviness of your menstrual periods  After your body has settled into a pattern of regular cycles, call your doctor if there is a change in this pattern  · Make sure any doctor or dentist who treats you knows that you are using this medicine  You may need to stop using this medicine for several weeks before you have surgery or any other condition that keeps you in bed for a while  · Your doctor will need to check your progress at regular visits while you are using this medicine  Be sure to keep all appointments  · This medicine will not protect you from HIV/AIDS or other sexually transmitted diseases    Possible Side Effects While Using This Medicine:   Call your doctor right away if you notice any of these side effects:  · Allergic reaction: Itching or hives, swelling in face or hands, swelling or tingling in the mouth or throat, tightness in chest, trouble breathing  · Chest pain  · Coughing up blood  · Severe abdominal pain, yellow skin or eyes  · Severe headache, trouble speaking, weakness or numbness in an arm or leg  · Severe mood changes  · Sharp pain in lower leg  · Very heavy vaginal bleeding, either sudden or ongoing  · Vision changes, loss of vision, double vision, protruding eyes  If you notice these less serious side effects, talk with your doctor:   · Breast tenderness or pain  · Swelling in your hands, ankles, or feet  · Trouble wearing contact lenses  · Weight gain  If you notice other side effects that you think are caused by this medicine, tell your doctor  Call your doctor for medical advice about side effects  You may report side effects to FDA at 8-759-FDA-2497  © 2017 [address] [employer] Information is for End User's use only and may not be sold, redistributed or otherwise used for commercial purposes  The above information is an  only  It is not intended as medical advice for individual conditions or treatments  Talk to your doctor, nurse or pharmacist before following any medical regimen to see if it is safe and effective for you  Dysfunctional Uterine Bleeding   WHAT YOU NEED TO KNOW:   What is dysfunctional uterine bleeding? Dysfunctional uterine bleeding (DUB) is abnormal uterine bleeding that is caused by a problem with your hormones  You may have bleeding from your uterus at times other than your normal monthly period  Your monthly periods may last longer or shorter, and bleeding may be heavier or lighter than usual    What causes DUB? DUB may be caused by too much or too little estrogen  You may have abnormal bleeding if an ovary does not release an egg during ovulation  Medical conditions such as polycystic ovary syndrome may increase your risk for DUB  What are the signs and symptoms of DUB? · Bleeding or spotting between periods    · Bleeding that starts 12 months or longer after you have been through menopause    · The amount of bleeding during your period is heavier or lighter than usual    · The number of days that you bleed during your regular period is longer than usual, or more than 7 days    · The number of days that you bleed is shorter than usual, or less than 2 days    · The time between your monthly periods is shorter or longer than usual  How is DUB diagnosed? · Blood tests  may be done to find the cause of your DUB and problems caused by DUB, such as anemia  · A pelvic exam  may be done to find the source of your bleeding      · A hysteroscopy  is a procedure to look at your endometrium  The endometrium is the lining inside of your uterus  Your healthcare provider will insert a small tube with a camera at the end into your uterus  · A biopsy  is a procedure to remove a small piece of tissue from the endometrium  The tissue is sent to a lab for tests  · An ultrasound  uses sound waves to show pictures of your uterus, ovaries, tubes, and vagina on a monitor  · A pap smear  may be needed  Your healthcare provider takes a sample of tissue from your cervix and sends it to a lab for tests  How is DUB treated? · Medicines:      ¨ Hormones  help decrease bleeding by making your monthly periods more regular  Sometimes this medicine may be given as birth control pills  ¨ NSAIDs  help decrease swelling and pain or fever  This medicine is available with or without a doctor's order  NSAIDs can cause stomach bleeding or kidney problems in certain people  If you take blood thinner medicine, always ask your healthcare provider if NSAIDs are safe for you  Always read the medicine label and follow directions  ¨ Iron supplements  may be given if your blood iron level decreases because of heavy bleeding  Iron may make you constipated  Ask your healthcare provider for ways to prevent or treat constipation  Iron may also make your bowel movements turn dark or black  · Surgery and procedures  may be needed if medicines do not work or cannot be used  You may need procedures, such as endometrial ablation or dilation and curettage, to control your bleeding  You may need an abdominal or vaginal hysterectomy  A hysterectomy is surgery to remove your uterus  How do I care for myself at home? · Apply heat  on your lower abdomen for 20 to 30 minutes every 2 hours for as many days as directed  Heat helps decrease pain and muscle spasms  · Include foods high in iron  if needed   Examples of foods high in iron are leafy green vegetables, beef, pork, liver, eggs, and whole-grain breads and cereals  · Keep a diary of your menstrual cycles  Keep track of the number of tampons or pads you use each day  · Talk to your healthcare provider before you start a weight loss program   You may need to wait until the abnormal bleeding has stopped before you try to lose weight  The amount of iron in your blood should be normal before you lose weight  When should I contact my healthcare provider? · You need to change your sanitary pad or tampon more than once an hour  · Your medicine causes nausea, vomiting, or diarrhea  · You have questions or concerns about your condition or care  When should I seek immediate care or call 911? · You continue to bleed heavily, or you feel faint  CARE AGREEMENT:   You have the right to help plan your care  Learn about your health condition and how it may be treated  Discuss treatment options with your caregivers to decide what care you want to receive  You always have the right to refuse treatment  The above information is an  only  It is not intended as medical advice for individual conditions or treatments  Talk to your doctor, nurse or pharmacist before following any medical regimen to see if it is safe and effective for you  © 2017 [address] [employer] Information is for End User's use only and may not be sold, redistributed or otherwise used for commercial purposes  All illustrations and images included in CareNotes® are the copyrighted property of A D A M , Inc  or Elliott Og

## 2019-11-16 ENCOUNTER — HOSPITAL ENCOUNTER (OUTPATIENT)
Dept: MAMMOGRAPHY | Facility: HOSPITAL | Age: 39
Discharge: HOME | End: 2019-11-16
Payer: COMMERCIAL

## 2019-11-16 VITALS — HEIGHT: 66 IN | WEIGHT: 229 LBS | BODY MASS INDEX: 36.8 KG/M2

## 2019-11-16 DIAGNOSIS — Z00.00 PHYSICAL EXAM, ANNUAL: ICD-10-CM

## 2019-11-16 PROCEDURE — 77063 BREAST TOMOSYNTHESIS BI: CPT

## 2019-11-16 PROCEDURE — 77067 SCR MAMMO BI INCL CAD: CPT

## 2019-11-21 ENCOUNTER — OFFICE VISIT (OUTPATIENT)
Dept: FAMILY MEDICINE CLINIC | Facility: CLINIC | Age: 39
End: 2019-11-21

## 2019-11-21 VITALS
HEIGHT: 66 IN | HEART RATE: 82 BPM | WEIGHT: 229.6 LBS | TEMPERATURE: 98.1 F | RESPIRATION RATE: 18 BRPM | BODY MASS INDEX: 36.9 KG/M2 | SYSTOLIC BLOOD PRESSURE: 134 MMHG | DIASTOLIC BLOOD PRESSURE: 80 MMHG

## 2019-11-21 DIAGNOSIS — N92.1 MENORRHAGIA WITH IRREGULAR CYCLE: ICD-10-CM

## 2019-11-21 DIAGNOSIS — G43.109 MIGRAINE WITH AURA AND WITHOUT STATUS MIGRAINOSUS, NOT INTRACTABLE: Primary | ICD-10-CM

## 2019-11-21 PROCEDURE — 99213 OFFICE O/P EST LOW 20 MIN: CPT | Performed by: FAMILY MEDICINE

## 2019-11-21 RX ORDER — AMITRIPTYLINE HYDROCHLORIDE 10 MG/1
10 TABLET, FILM COATED ORAL
Qty: 45 TABLET | Refills: 1 | Status: SHIPPED | OUTPATIENT
Start: 2019-11-21 | End: 2021-01-26 | Stop reason: SDDI

## 2019-11-21 RX ORDER — METHOCARBAMOL 500 MG/1
500 TABLET, FILM COATED ORAL DAILY PRN
Qty: 10 TABLET | Refills: 0 | Status: SHIPPED | OUTPATIENT
Start: 2019-11-21 | End: 2021-01-26 | Stop reason: ALTCHOICE

## 2019-11-22 NOTE — ASSESSMENT & PLAN NOTE
Patient has tried Topamax, Imitrex in the past   Currently only taking ibuprofen with at least twice weekly headaches  Recommended starting amitriptyline 10 mg at night for 1st 2 weeks, and then increasing it to 20 mg for next 2 weeks   may continue to use ibuprofen 600 mg Q 8 p r n   In case of acute headache   patient to follow up in 4 weeks   side effects reviewed, advised to call in case of problems taking medication

## 2019-11-22 NOTE — PROGRESS NOTES
Assessment/Plan     Migraine with aura and without status migrainosus, not intractable    Patient has tried Topamax, Imitrex in the past   Currently only taking ibuprofen with at least twice weekly headaches  Recommended starting amitriptyline 10 mg at night for 1st 2 weeks, and then increasing it to 20 mg for next 2 weeks   may continue to use ibuprofen 600 mg Q 8 p r n  In case of acute headache   patient to follow up in 4 weeks   side effects reviewed, advised to call in case of problems taking medication      Menorrhagia with irregular cycle   Continue follow-up with gyn, recently received Depo shot for AUB    Diagnoses and all orders for this visit:    Migraine with aura and without status migrainosus, not intractable  -     amitriptyline (ELAVIL) 10 mg tablet; Take 1 tablet (10 mg total) by mouth daily at bedtime For first 2 weeks take 10 mg at night then 20  -     magnesium oxide (MAG-OX) 400 mg; Take 1 tablet (400 mg total) by mouth 2 (two) times a day  -     methocarbamol (ROBAXIN) 500 mg tablet; Take 1 tablet (500 mg total) by mouth daily as needed for muscle spasms    Menorrhagia with irregular cycle         Subjective     Chief Complaint   Patient presents with    Menstrual Problem        25-year-old female presented to office for follow-up of migraine headaches  Patient reports that she has been having unilateral headaches for quite some time now, they are more frequent now, occurs at least 2 times in a week  Today she has been having headache for last 5 days, more on the left side, associated with photophobia and phonophobia,  Reports floaters  She has been taking ibuprofen, with minimal relief  , today feels a little bit better  She used to be on topiramate 100 mg daily, but it was not helping so stopped it  She also has tried  Imitrex in the past but did not help her    She was also having abnormal uterine bleeding, has been following up gyn for that, given a Depo shot, which seems to have increased the headache  The following portions of the patient's history were reviewed and updated as appropriate: allergies, current medications, past family history, past medical history, past social history, past surgical history and problem list     Review of Systems   Constitutional: Negative for activity change, chills and fever  HENT: Negative for congestion  Respiratory: Negative for shortness of breath  Cardiovascular: Negative for chest pain  Gastrointestinal: Negative for diarrhea, nausea and vomiting  Genitourinary: Positive for menstrual problem  Negative for difficulty urinating  Neurological: Positive for headaches  Objective     Vitals:Blood pressure 134/80, pulse 82, temperature 98 1 °F (36 7 °C), temperature source Tympanic, resp  rate 18, height 5' 6" (1 676 m), weight 104 kg (229 lb 9 6 oz), last menstrual period 11/05/2019, not currently breastfeeding  Physical Exam:  Physical Exam   Constitutional: She is oriented to person, place, and time  She appears well-developed and well-nourished  HENT:   Head: Normocephalic and atraumatic  Mouth/Throat: Oropharynx is clear and moist    Eyes: Conjunctivae and EOM are normal    Neck: Normal range of motion  Neck supple  Cardiovascular: Normal rate, regular rhythm and normal heart sounds  Exam reveals no friction rub  No murmur heard  Pulmonary/Chest: Effort normal and breath sounds normal  No respiratory distress  She has no wheezes  She has no rales  Abdominal: Soft  Bowel sounds are normal  She exhibits no distension  There is no tenderness  Musculoskeletal: Normal range of motion  Neurological: She is alert and oriented to person, place, and time  Skin: Skin is warm and dry  Vitals reviewed

## 2020-01-21 ENCOUNTER — TELEPHONE (OUTPATIENT)
Dept: OBGYN CLINIC | Facility: CLINIC | Age: 40
End: 2020-01-21

## 2020-01-21 NOTE — TELEPHONE ENCOUNTER
Left friendly voicemail for pt to please call [facility]'s central scheduling at [phone] to schedule her pelvic ultrasound  The order is already placed in EPIC, your medical record  Any questions feel free to use [facility] or call [phone], [facility]'s [facility]

## 2020-02-28 DIAGNOSIS — N83.201 RIGHT OVARIAN CYST: Primary | ICD-10-CM

## 2020-02-28 NOTE — TELEPHONE ENCOUNTER
Per Nithin Zimmerman message 02/28/2020: Please call patient it is time for repeat ultrasound of right ovarian cyst   Thank you    Left voicemail as a friendly reminder for pt to schedule her pelvic ultrasound  Central scheduling and our office number were provided

## 2021-01-26 ENCOUNTER — TELEMEDICINE (OUTPATIENT)
Dept: FAMILY MEDICINE CLINIC | Facility: CLINIC | Age: 41
End: 2021-01-26

## 2021-01-26 DIAGNOSIS — G44.221 CHRONIC TENSION-TYPE HEADACHE, INTRACTABLE: Primary | ICD-10-CM

## 2021-01-26 PROCEDURE — 99213 OFFICE O/P EST LOW 20 MIN: CPT | Performed by: FAMILY MEDICINE

## 2021-01-26 RX ORDER — CYCLOBENZAPRINE HCL 5 MG
5 TABLET ORAL 3 TIMES DAILY PRN
Qty: 21 TABLET | Refills: 0 | Status: SHIPPED | OUTPATIENT
Start: 2021-01-26 | End: 2021-11-23

## 2021-01-26 RX ORDER — NAPROXEN 500 MG/1
500 TABLET ORAL 2 TIMES DAILY WITH MEALS
Qty: 14 TABLET | Refills: 0 | Status: SHIPPED | OUTPATIENT
Start: 2021-01-26 | End: 2021-11-23

## 2021-01-26 RX ORDER — RIZATRIPTAN BENZOATE 10 MG/1
10 TABLET, ORALLY DISINTEGRATING ORAL ONCE AS NEEDED
Qty: 9 TABLET | Refills: 3 | Status: SHIPPED | OUTPATIENT
Start: 2021-01-26 | End: 2022-06-09 | Stop reason: SDUPTHER

## 2021-01-26 NOTE — PROGRESS NOTES
Virtual Brief Visit    Assessment/Plan:    Problem List Items Addressed This Visit        Nervous and Auditory    Chronic tension-type headache, intractable - Primary     - this is an ongoing chronic issue, however last episode started yesterday and does not improved with OTC analgesics  - patient has tried, per chart review Imitrex and Topamax in the past along with amitriptyline  This was in 2019   - Patient is not on any prophylactic or abortive therapy currently  Will start Maxalt and naproxen  Discussed Flexeril and no evidence to support muscle relaxers in tension type headaches, shared decision-making will trial Flexeril  Advised to only take at night to ensure patient tolerates well/due to side effects of drowsiness  - referred to Neurology due to extensive history and feeling prior treatment  - RTC/ED precautions discussed         Relevant Medications    rizatriptan (MAXALT-MLT) 10 MG disintegrating tablet    naproxen (NAPROSYN) 500 mg tablet    cyclobenzaprine (FLEXERIL) 5 mg tablet    Other Relevant Orders    Ambulatory referral to Neurology                Reason for visit is   Chief Complaint   Patient presents with    Virtual Brief Visit        Encounter provider Brandy De La Fuente MD    Provider located at [address]   [phone]    Recent Visits  No visits were found meeting these conditions  Showing recent visits within past 7 days and meeting all other requirements     Today's Visits  Date Type Provider Dept   01/26/21 Telemedicine Brandy De La Fuente MD [facility]   Showing today's visits and meeting all other requirements     Future Appointments  No visits were found meeting these conditions  Showing future appointments within next 150 days and meeting all other requirements        After connecting through telephone, the patient was identified by name and date of birth   Louie Samayoa was informed that this is a telemedicine visit and that the visit is being conducted through telephone  My office door was closed  No one else was in the room  She acknowledged consent and understanding of privacy and security of the platform  The patient has agreed to participate and understands she can discontinue the visit at any time  Patient is aware this is a billable service  It was my intent to perform this visit via video technology but the patient was not able to do a video connection so the visit was completed via audio telephone only  Ignacio Porter is a 38 y o  female   Headache   This is a chronic (has been experiencing HA since childhood, last seen in 2019 for DELATORRE  This episode started yesterday) problem  The current episode started yesterday  The problem occurs constantly  The problem has been gradually worsening  The pain is located in the temporal and parietal region  The pain radiates to the right neck and right shoulder  The pain quality is similar to prior headaches  The quality of the pain is described as aching and throbbing  The pain is moderate  Associated symptoms include eye pain, eye watering, nausea, neck pain, phonophobia, photophobia and scalp tenderness  Pertinent negatives include no abdominal pain, anorexia, blurred vision, dizziness, ear pain, eye redness, fever, loss of balance, numbness, rhinorrhea, tingling, visual change or vomiting  The symptoms are aggravated by bright light and noise  She has tried Excedrin and NSAIDs for the symptoms  The treatment provided no relief  Her past medical history is significant for migraine headaches, migraines in the family and obesity  There is no history of cancer, hypertension, recent head traumas or sinus disease          Past Medical History:   Diagnosis Date    Heart murmur     Migraine        Past Surgical History:   Procedure Laterality Date     SECTION      CHOLECYSTECTOMY      COLPOSCOPY         Current Outpatient Medications   Medication Sig Dispense Refill    cyclobenzaprine (FLEXERIL) 5 mg tablet Take 1 tablet (5 mg total) by mouth 3 (three) times a day as needed for muscle spasms for up to 7 days 21 tablet 0    ergocalciferol (VITAMIN D2) 50,000 units Take 1 capsule (50,000 Units total) by mouth once a week 8 capsule 0    ferrous sulfate 325 (65 Fe) mg tablet Take 1 tablet (325 mg total) by mouth daily with breakfast 30 tablet 5    ibuprofen (MOTRIN) 600 mg tablet Take 1 tablet (600 mg total) by mouth every 6 (six) hours as needed for mild pain or headaches 90 tablet 0    magnesium oxide (MAG-OX) 400 mg Take 1 tablet (400 mg total) by mouth 2 (two) times a day 60 tablet 1    medroxyPROGESTERone (DEPO-PROVERA) 150 mg/mL injection Inject 1 mL (150 mg total) into a muscle every 3 (three) months 1 mL 3    naproxen (NAPROSYN) 500 mg tablet Take 1 tablet (500 mg total) by mouth 2 (two) times a day with meals for 7 days 14 tablet 0    rizatriptan (MAXALT-MLT) 10 MG disintegrating tablet Take 1 tablet (10 mg total) by mouth once as needed for migraine for up to 1 dose May repeat in 2 hours if needed 9 tablet 3     Current Facility-Administered Medications   Medication Dose Route Frequency Provider Last Rate Last Admin    medroxyPROGESTERone acetate (DEPO-PROVERA SYRINGE) IM injection 150 mg  150 mg Intramuscular Q3 Months SHANEL [place]   150 mg at 11/13/19 1347        No Known Allergies    Review of Systems   Constitutional: Negative for fever  HENT: Negative for ear pain and rhinorrhea  Eyes: Positive for photophobia and pain  Negative for blurred vision and redness  Gastrointestinal: Positive for nausea  Negative for abdominal pain, anorexia and vomiting  Musculoskeletal: Positive for neck pain  Neurological: Positive for headaches  Negative for dizziness, tingling, numbness and loss of balance  There were no vitals filed for this visit        I spent 20 minutes directly with the patient during this visit    VIRTUAL VISIT DISCLAIMER    Bessy Iain acknowledges that she has consented to an online visit or consultation  She understands that the online visit is based solely on information provided by her, and that, in the absence of a face-to-face physical evaluation by the physician, the diagnosis she receives is both limited and provisional in terms of accuracy and completeness  This is not intended to replace a full medical face-to-face evaluation by the physician  Bessy Timmons understands and accepts these terms

## 2021-01-26 NOTE — ASSESSMENT & PLAN NOTE
- this is an ongoing chronic issue, however last episode started yesterday and does not improved with OTC analgesics  - patient has tried, per chart review Imitrex and Topamax in the past along with amitriptyline  This was in 2019   - Patient is not on any prophylactic or abortive therapy currently  Will start Maxalt and naproxen  Discussed Flexeril and no evidence to support muscle relaxers in tension type headaches, shared decision-making will trial Flexeril  Advised to only take at night to ensure patient tolerates well/due to side effects of drowsiness    - referred to Neurology due to extensive history and feeling prior treatment  - RTC/ED precautions discussed

## 2021-11-23 ENCOUNTER — TELEMEDICINE (OUTPATIENT)
Dept: FAMILY MEDICINE CLINIC | Facility: CLINIC | Age: 41
End: 2021-11-23

## 2021-11-23 DIAGNOSIS — Z71.89 ADVICE GIVEN ABOUT COVID-19 VIRUS INFECTION: Primary | ICD-10-CM

## 2021-11-23 PROCEDURE — 99213 OFFICE O/P EST LOW 20 MIN: CPT | Performed by: PHYSICIAN ASSISTANT

## 2021-12-07 ENCOUNTER — OFFICE VISIT (OUTPATIENT)
Dept: FAMILY MEDICINE CLINIC | Facility: CLINIC | Age: 41
End: 2021-12-07

## 2021-12-07 VITALS
BODY MASS INDEX: 36.32 KG/M2 | HEART RATE: 90 BPM | SYSTOLIC BLOOD PRESSURE: 142 MMHG | HEIGHT: 66 IN | RESPIRATION RATE: 18 BRPM | TEMPERATURE: 98 F | WEIGHT: 226 LBS | DIASTOLIC BLOOD PRESSURE: 96 MMHG | OXYGEN SATURATION: 99 %

## 2021-12-07 DIAGNOSIS — R03.0 ELEVATED BP WITHOUT DIAGNOSIS OF HYPERTENSION: ICD-10-CM

## 2021-12-07 DIAGNOSIS — Z11.4 SCREENING FOR HIV (HUMAN IMMUNODEFICIENCY VIRUS): ICD-10-CM

## 2021-12-07 DIAGNOSIS — Z00.00 ANNUAL PHYSICAL EXAM: Primary | ICD-10-CM

## 2021-12-07 DIAGNOSIS — D50.8 OTHER IRON DEFICIENCY ANEMIA: ICD-10-CM

## 2021-12-07 DIAGNOSIS — Z11.59 NEED FOR HEPATITIS C SCREENING TEST: ICD-10-CM

## 2021-12-07 DIAGNOSIS — Z12.31 ENCOUNTER FOR SCREENING MAMMOGRAM FOR BREAST CANCER: ICD-10-CM

## 2021-12-07 PROCEDURE — 99396 PREV VISIT EST AGE 40-64: CPT | Performed by: PHYSICIAN ASSISTANT

## 2021-12-23 ENCOUNTER — APPOINTMENT (OUTPATIENT)
Dept: LAB | Facility: CLINIC | Age: 41
End: 2021-12-23
Payer: COMMERCIAL

## 2021-12-23 ENCOUNTER — OFFICE VISIT (OUTPATIENT)
Dept: FAMILY MEDICINE CLINIC | Facility: CLINIC | Age: 41
End: 2021-12-23

## 2021-12-23 VITALS
RESPIRATION RATE: 18 BRPM | OXYGEN SATURATION: 100 % | HEIGHT: 66 IN | WEIGHT: 226 LBS | TEMPERATURE: 97.2 F | DIASTOLIC BLOOD PRESSURE: 100 MMHG | BODY MASS INDEX: 36.32 KG/M2 | SYSTOLIC BLOOD PRESSURE: 152 MMHG | HEART RATE: 87 BPM

## 2021-12-23 DIAGNOSIS — D50.8 OTHER IRON DEFICIENCY ANEMIA: ICD-10-CM

## 2021-12-23 DIAGNOSIS — Z00.00 ANNUAL PHYSICAL EXAM: ICD-10-CM

## 2021-12-23 DIAGNOSIS — H60.501 ACUTE OTITIS EXTERNA OF RIGHT EAR, UNSPECIFIED TYPE: Primary | ICD-10-CM

## 2021-12-23 DIAGNOSIS — Z11.4 SCREENING FOR HIV (HUMAN IMMUNODEFICIENCY VIRUS): ICD-10-CM

## 2021-12-23 DIAGNOSIS — Z11.59 NEED FOR HEPATITIS C SCREENING TEST: ICD-10-CM

## 2021-12-23 LAB
ALBUMIN SERPL BCP-MCNC: 3.1 G/DL (ref 3.5–5)
ALP SERPL-CCNC: 75 U/L (ref 46–116)
ALT SERPL W P-5'-P-CCNC: 22 U/L (ref 12–78)
ANION GAP SERPL CALCULATED.3IONS-SCNC: 10 MMOL/L (ref 4–13)
AST SERPL W P-5'-P-CCNC: 15 U/L (ref 5–45)
BILIRUB SERPL-MCNC: 0.66 MG/DL (ref 0.2–1)
BUN SERPL-MCNC: 10 MG/DL (ref 5–25)
CALCIUM ALBUM COR SERPL-MCNC: 8.9 MG/DL (ref 8.3–10.1)
CALCIUM SERPL-MCNC: 8.2 MG/DL (ref 8.3–10.1)
CHLORIDE SERPL-SCNC: 103 MMOL/L (ref 100–108)
CHOLEST SERPL-MCNC: 202 MG/DL
CO2 SERPL-SCNC: 27 MMOL/L (ref 21–32)
CREAT SERPL-MCNC: 0.94 MG/DL (ref 0.6–1.3)
ERYTHROCYTE [DISTWIDTH] IN BLOOD BY AUTOMATED COUNT: 18.6 % (ref 11.6–15.1)
GFR SERPL CREATININE-BSD FRML MDRD: 75 ML/MIN/1.73SQ M
GLUCOSE P FAST SERPL-MCNC: 92 MG/DL (ref 65–99)
HCT VFR BLD AUTO: 31.9 % (ref 34.8–46.1)
HCV AB SER QL: NORMAL
HDLC SERPL-MCNC: 53 MG/DL
HGB BLD-MCNC: 9.2 G/DL (ref 11.5–15.4)
LDLC SERPL CALC-MCNC: 126 MG/DL (ref 0–100)
MCH RBC QN AUTO: 21 PG (ref 26.8–34.3)
MCHC RBC AUTO-ENTMCNC: 28.8 G/DL (ref 31.4–37.4)
MCV RBC AUTO: 73 FL (ref 82–98)
NONHDLC SERPL-MCNC: 149 MG/DL
PLATELET # BLD AUTO: 546 THOUSANDS/UL (ref 149–390)
PMV BLD AUTO: 9 FL (ref 8.9–12.7)
POTASSIUM SERPL-SCNC: 3.2 MMOL/L (ref 3.5–5.3)
PROT SERPL-MCNC: 7.8 G/DL (ref 6.4–8.2)
RBC # BLD AUTO: 4.39 MILLION/UL (ref 3.81–5.12)
SODIUM SERPL-SCNC: 140 MMOL/L (ref 136–145)
TRIGL SERPL-MCNC: 114 MG/DL
TSH SERPL DL<=0.05 MIU/L-ACNC: 0.97 UIU/ML (ref 0.36–3.74)
WBC # BLD AUTO: 6.32 THOUSAND/UL (ref 4.31–10.16)

## 2021-12-23 PROCEDURE — 80053 COMPREHEN METABOLIC PANEL: CPT

## 2021-12-23 PROCEDURE — 87389 HIV-1 AG W/HIV-1&-2 AB AG IA: CPT

## 2021-12-23 PROCEDURE — 99213 OFFICE O/P EST LOW 20 MIN: CPT | Performed by: FAMILY MEDICINE

## 2021-12-23 PROCEDURE — 36415 COLL VENOUS BLD VENIPUNCTURE: CPT

## 2021-12-23 PROCEDURE — 84443 ASSAY THYROID STIM HORMONE: CPT

## 2021-12-23 PROCEDURE — 86803 HEPATITIS C AB TEST: CPT

## 2021-12-23 PROCEDURE — 80061 LIPID PANEL: CPT

## 2021-12-23 PROCEDURE — 85027 COMPLETE CBC AUTOMATED: CPT

## 2021-12-23 RX ORDER — AMOXICILLIN AND CLAVULANATE POTASSIUM 875; 125 MG/1; MG/1
1 TABLET, FILM COATED ORAL EVERY 12 HOURS SCHEDULED
Qty: 10 TABLET | Refills: 0 | Status: SHIPPED | OUTPATIENT
Start: 2021-12-23 | End: 2021-12-28

## 2021-12-23 RX ORDER — NEOMYCIN SULFATE, POLYMYXIN B SULFATE AND HYDROCORTISONE 10; 3.5; 1 MG/ML; MG/ML; [USP'U]/ML
4 SUSPENSION/ DROPS AURICULAR (OTIC) 4 TIMES DAILY
Qty: 10 ML | Refills: 0 | Status: SHIPPED | OUTPATIENT
Start: 2021-12-23 | End: 2022-07-15

## 2021-12-26 LAB — HIV 1+2 AB+HIV1 P24 AG SERPL QL IA: NORMAL

## 2021-12-28 ENCOUNTER — TELEPHONE (OUTPATIENT)
Dept: FAMILY MEDICINE CLINIC | Facility: CLINIC | Age: 41
End: 2021-12-28

## 2021-12-28 DIAGNOSIS — D50.8 OTHER IRON DEFICIENCY ANEMIA: Primary | ICD-10-CM

## 2021-12-28 DIAGNOSIS — E87.6 HYPOKALEMIA: ICD-10-CM

## 2021-12-28 RX ORDER — FERROUS SULFATE 324(65)MG
324 TABLET, DELAYED RELEASE (ENTERIC COATED) ORAL 2 TIMES DAILY
Qty: 60 TABLET | Refills: 3 | Status: SHIPPED | OUTPATIENT
Start: 2021-12-28 | End: 2022-04-28 | Stop reason: SDUPTHER

## 2021-12-28 RX ORDER — POTASSIUM CHLORIDE 20 MEQ/1
TABLET, EXTENDED RELEASE ORAL
Qty: 4 TABLET | Refills: 0 | Status: SHIPPED | OUTPATIENT
Start: 2021-12-28 | End: 2022-05-10 | Stop reason: SDUPTHER

## 2022-03-10 ENCOUNTER — OFFICE VISIT (OUTPATIENT)
Dept: FAMILY MEDICINE CLINIC | Facility: CLINIC | Age: 42
End: 2022-03-10

## 2022-03-10 VITALS
DIASTOLIC BLOOD PRESSURE: 94 MMHG | BODY MASS INDEX: 37.12 KG/M2 | WEIGHT: 231 LBS | TEMPERATURE: 98.4 F | HEART RATE: 96 BPM | RESPIRATION RATE: 20 BRPM | SYSTOLIC BLOOD PRESSURE: 146 MMHG | OXYGEN SATURATION: 100 % | HEIGHT: 66 IN

## 2022-03-10 DIAGNOSIS — K04.7 TOOTH INFECTION: Primary | ICD-10-CM

## 2022-03-10 PROCEDURE — 99214 OFFICE O/P EST MOD 30 MIN: CPT | Performed by: FAMILY MEDICINE

## 2022-03-10 RX ORDER — NAPROXEN 500 MG/1
500 TABLET ORAL 2 TIMES DAILY WITH MEALS
Qty: 60 TABLET | Refills: 0 | Status: SHIPPED | OUTPATIENT
Start: 2022-03-10

## 2022-03-10 RX ORDER — AMOXICILLIN 500 MG/1
500 CAPSULE ORAL EVERY 12 HOURS SCHEDULED
Qty: 14 CAPSULE | Refills: 0 | Status: SHIPPED | OUTPATIENT
Start: 2022-03-10 | End: 2022-03-17

## 2022-03-10 NOTE — PROGRESS NOTES
Assessment/Plan:    Tooth infection  -patient has appointment with oral surgery at the end of month for tooth extraction  -a lot of facial tenderness, cavity noted  -will prescribe Amoxicillin 500 mg BID for 7 days for tooth infection  -may take Tylenol up to 3 g /in 24 hrs:advised 1,000 every 8 hrs  -prescribed naproxen to take 2 times a day between Tylenol doses, advised d/c Ibuprofen  -f/u PRN       Diagnoses and all orders for this visit:    Tooth infection  -     amoxicillin (AMOXIL) 500 mg capsule; Take 1 capsule (500 mg total) by mouth every 12 (twelve) hours for 7 days  -     naproxen (Naprosyn) 500 mg tablet; Take 1 tablet (500 mg total) by mouth 2 (two) times a day with meals          Subjective:      Patient ID: Mague Tiwari is a 42 y o  female  Patient presented for the visit due to tooth pain, has a scheduled appointment for tooth exctraction at the end of March  The following portions of the patient's history were reviewed and updated as appropriate: allergies, current medications, past family history, past medical history, past social history, past surgical history and problem list     Review of Systems   Constitutional: Negative  HENT: Positive for dental problem  Negative for facial swelling (pain in the right side of the gface (cheeks, jaw))  Eyes: Negative  Respiratory: Negative  Cardiovascular: Negative  Genitourinary: Negative  Objective:      /94 (BP Location: Left arm, Patient Position: Sitting, Cuff Size: Large)   Pulse 96   Temp 98 4 °F (36 9 °C) (Temporal)   Resp 20   Ht 5' 6" (1 676 m)   Wt 105 kg (231 lb)   SpO2 100%   BMI 37 28 kg/m²          Physical Exam  Constitutional:       General: She is not in acute distress  Appearance: She is well-developed  HENT:      Head: Normocephalic and atraumatic  Mouth/Throat:      Dentition: Abnormal dentition  Dental tenderness and dental caries present  No dental abscesses or gum lesions  Tongue: No lesions  Palate: No lesions  Pharynx: No pharyngeal swelling or posterior oropharyngeal erythema  Eyes:      General: No scleral icterus  Pupils: Pupils are equal, round, and reactive to light  Cardiovascular:      Rate and Rhythm: Normal rate and regular rhythm  Heart sounds: Normal heart sounds  No murmur heard  No friction rub  No gallop  Pulmonary:      Effort: Pulmonary effort is normal  No respiratory distress  Breath sounds: Normal breath sounds  No wheezing or rales  Chest:      Chest wall: No tenderness  Abdominal:      General: Bowel sounds are normal  There is no distension  Palpations: Abdomen is soft  Tenderness: There is no abdominal tenderness  There is no rebound  Musculoskeletal:         General: No tenderness or deformity  Normal range of motion  Cervical back: Normal range of motion and neck supple  Lymphadenopathy:      Cervical: No cervical adenopathy  Skin:     General: Skin is warm and dry  Coloration: Skin is not pale  Findings: No erythema or rash  Neurological:      Mental Status: She is alert and oriented to person, place, and time

## 2022-03-10 NOTE — ASSESSMENT & PLAN NOTE
-patient has appointment with oral surgery at the end of month for tooth extraction  -a lot of facial tenderness, cavity noted  -will prescribe Amoxicillin 500 mg BID for 7 days for tooth infection  -may take Tylenol up to 3 g /in 24 hrs:advised 1,000 every 8 hrs  -prescribed naproxen to take 2 times a day between Tylenol doses, advised d/c Ibuprofen  -f/u PRN History of Present Illness


General


Chief Complaint:  Dyspnea/Respdistress


Source:  Medical Record





Present Illness


HPI


Patient is a 76-year-old female brought in by EMS after increased difficulty 

breathing.  Patient noted be having prior history of anemia.  She had prior DO 

NOT RESUSCITATE order.  The patient was noted to have previously been anemia is 

noted to be Islam.  She had been given Epogen.  Patient was noted 

to be more short of breath and was sent to the hospital for further treatment


Allergies:  


Coded Allergies:  


     No Known Allergies (Unverified , 1/8/18)





Patient History


Past Medical History:  see triage record


Pregnant Now:  No


Reviewed Nursing Documentation:  PMH: Agreed, PSxH: Agreed





Nursing Documentation-PMH


Past Medical History:  No History, Except For


Hx Hypertension:  Yes


Hx Diabetes:  Yes - Type 2


Hx Gastrointestinal Problems:  Yes - GERD, Dysphagia


Hx Dialysis:  No - CKD


Hx Seizures:  Yes





Review of Systems


All Other Systems:  limited - by mental status





Physical Exam





Vital Signs








  Date Time  Temp Pulse Resp B/P (MAP) Pulse Ox O2 Delivery O2 Flow Rate FiO2


 


1/8/18 05:22  64 22 165/75 99 Non-Rebreather 15.0 








General Appearance:  alert, severe distress, Chronically Ill


Eyes:  bilateral eye conjunctivae pale


ENT:  moist mucus membranes


Neck:  limited range of motion


Respiratory:  accessory muscle use, rales


Cardiovascular #1:  edema


Gastrointestinal:  normal inspection, non tender


Musculoskeletal:  decreased range of motion


Neurologic:  alert, motor weakness


Skin:  pallor





Medical Decision Making


Diagnostic Impression:  


 Primary Impression:  


 Respiratory failure


 Additional Impressions:  


 Hyperkalemia


 Fluid overload


 Anemia


ER Course


Patient presented for shortness of breath.  Differential included but was not 

limited to anemia, pneumonia, pneumothorax, myocardial infarction, pericardial 

effusion, congestive heart failure, acidosis.  Because of complexity of patient'

s case laboratory testing and imaging studies were ordered.Laboratory testing 

showed markedly hyperkalemia as well as fluid overload.  Patient started on 

BiPAP due to respiratory distress.  The patient was given IV Lasix.  Dr. Daniel Rivas was contacted for inpatient management.  The patient was noted to have  

intensity of comfort measures.





Labs








Test


  1/8/18


05:35


 


White Blood Count


  12.1 K/UL


(4.8-10.8)


 


Red Blood Count


  1.62 M/UL


(4.20-5.40)


 


Hemoglobin


  5.6 G/DL


(12.0-16.0)


 


Hematocrit


  16.7 %


(37.0-47.0)


 


Mean Corpuscular Volume 104 FL (80-99) 


 


Mean Corpuscular Hemoglobin


  34.8 PG


(27.0-31.0)


 


Mean Corpuscular Hemoglobin


Concent 33.6 G/DL


(32.0-36.0)


 


Red Cell Distribution Width


  16.6 %


(11.6-14.8)


 


Platelet Count


  232 K/UL


(150-450)


 


Mean Platelet Volume


  7.7 FL


(6.5-10.1)


 


Neutrophils (%) (Auto)  % (45.0-75.0) 


 


Lymphocytes (%) (Auto)  % (20.0-45.0) 


 


Monocytes (%) (Auto)  % (1.0-10.0) 


 


Eosinophils (%) (Auto)  % (0.0-3.0) 


 


Basophils (%) (Auto)  % (0.0-2.0) 


 


Sodium Level


  132 MMOL/L


(136-145)


 


Potassium Level


  7.0 MMOL/L


(3.5-5.1)


 


Chloride Level


  95 MMOL/L


()


 


Carbon Dioxide Level


  20 MMOL/L


(21-32)


 


Anion Gap


  17 mmol/L


(5-15)


 


Blood Urea Nitrogen


  173 mg/dL


(7-18)


 


Creatinine


  6.8 MG/DL


(0.55-1.30)


 


Estimat Glomerular Filtration


Rate  mL/min (>60) 


 


 


Glucose Level


  297 MG/DL


()


 


Lactic Acid Level


  4.60 mmol/L


(0.66-2.22)


 


Calcium Level


  8.0 MG/DL


(8.5-10.1)


 


Total Bilirubin


  0.5 MG/DL


(0.2-1.0)


 


Aspartate Amino Transf


(AST/SGOT) 41 U/L (15-37) 


 


 


Alanine Aminotransferase


(ALT/SGPT) 80 U/L (12-78) 


 


 


Alkaline Phosphatase


  162 U/L


()


 


Total Creatine Kinase


  848 U/L


()


 


Creatine Kinase MB


  6.5 NG/ML


(0.0-3.6)


 


Creatine Kinase MB Relative


Index 0.7 


 


 


Troponin I


  0.417 ng/mL


(0.000-0.056)


 


Pro-B-Type Natriuretic Peptide


  30901 pg/mL


(0-125)


 


Total Protein


  9.0 G/DL


(6.4-8.2)


 


Albumin


  3.9 G/DL


(3.4-5.0)


 


Globulin 5.1 g/dL 


 


Albumin/Globulin Ratio 0.8 (1.0-2.7) 











Last Vital Signs








  Date Time  Temp Pulse Resp B/P (MAP) Pulse Ox O2 Delivery O2 Flow Rate FiO2


 


1/8/18 05:22  64 22 165/75 99 Non-Rebreather 15.0 








Status:  unchanged


Disposition:  ADMITTED AS INPATIENT


Condition:  Critical











Dann Miller Jan 8, 2018 05:32

## 2022-04-14 ENCOUNTER — HOSPITAL ENCOUNTER (OUTPATIENT)
Dept: MAMMOGRAPHY | Facility: HOSPITAL | Age: 42
Discharge: HOME | End: 2022-04-14
Payer: COMMERCIAL

## 2022-04-14 VITALS — BODY MASS INDEX: 37.2 KG/M2 | HEIGHT: 66 IN | WEIGHT: 231.48 LBS

## 2022-04-14 DIAGNOSIS — Z12.31 ENCOUNTER FOR SCREENING MAMMOGRAM FOR BREAST CANCER: ICD-10-CM

## 2022-04-14 PROCEDURE — 77067 SCR MAMMO BI INCL CAD: CPT

## 2022-04-14 PROCEDURE — 77063 BREAST TOMOSYNTHESIS BI: CPT

## 2022-04-28 ENCOUNTER — OFFICE VISIT (OUTPATIENT)
Dept: FAMILY MEDICINE CLINIC | Facility: CLINIC | Age: 42
End: 2022-04-28

## 2022-04-28 VITALS
SYSTOLIC BLOOD PRESSURE: 139 MMHG | DIASTOLIC BLOOD PRESSURE: 82 MMHG | HEIGHT: 66 IN | WEIGHT: 234 LBS | HEART RATE: 94 BPM | TEMPERATURE: 98.2 F | BODY MASS INDEX: 37.61 KG/M2 | OXYGEN SATURATION: 100 % | RESPIRATION RATE: 20 BRPM

## 2022-04-28 DIAGNOSIS — M25.512 LEFT ANTERIOR SHOULDER PAIN: Primary | ICD-10-CM

## 2022-04-28 DIAGNOSIS — D50.8 OTHER IRON DEFICIENCY ANEMIA: ICD-10-CM

## 2022-04-28 PROCEDURE — 99213 OFFICE O/P EST LOW 20 MIN: CPT | Performed by: FAMILY MEDICINE

## 2022-04-28 RX ORDER — HYDROCODONE BITARTRATE AND ACETAMINOPHEN 5; 325 MG/1; MG/1
TABLET ORAL
COMMUNITY
Start: 2022-04-07 | End: 2022-06-09

## 2022-04-28 RX ORDER — CYCLOBENZAPRINE HCL 5 MG
5 TABLET ORAL
Qty: 20 TABLET | Refills: 0 | Status: SHIPPED | OUTPATIENT
Start: 2022-04-28

## 2022-04-28 RX ORDER — FERROUS SULFATE 324(65)MG
324 TABLET, DELAYED RELEASE (ENTERIC COATED) ORAL 2 TIMES DAILY
Qty: 60 TABLET | Refills: 3 | Status: SHIPPED | OUTPATIENT
Start: 2022-04-28

## 2022-04-28 NOTE — ASSESSMENT & PLAN NOTE
Acute, 2 week duration, occurred after lifting heavy laundry basket  - ROM normal; however, there is TTP over the left pectoralis muscle group and mild weakness of the LUE due to the pain  - Most likely muscle strain/tear  - Recommended continuing Naprosyn or using Diclofenac gel over anterior pec muscles, continue heating pad, Flexeril qHs, and referral provided for PT

## 2022-04-28 NOTE — PROGRESS NOTES
Assessment/Plan:    Left anterior shoulder pain  Acute, 2 week duration, occurred after lifting heavy laundry basket  - ROM normal; however, there is TTP over the left pectoralis muscle group and mild weakness of the LUE due to the pain  - Most likely muscle strain/tear  - Recommended continuing Naprosyn or using Diclofenac gel over anterior pec muscles, continue heating pad, Flexeril qHs, and referral provided for PT       Diagnoses and all orders for this visit:    Left anterior shoulder pain  -     Ambulatory Referral to Physical Therapy; Future  -     cyclobenzaprine (FLEXERIL) 5 mg tablet; Take 1 tablet (5 mg total) by mouth daily at bedtime    Other iron deficiency anemia  -     ferrous sulfate 324 MG TBEC; Take 1 tablet (324 mg total) by mouth 2 (two) times a day  -     CBC and differential; Future    Other orders  -     HYDROcodone-acetaminophen (NORCO) 5-325 mg per tablet; TAKE 1 TABLET BY MOUTH EVERY 4-6 HOURS AS NEEDED FOR PAIN          Subjective:      Patient ID: Louie Samayoa is a 42 y o  female  36yo female who's left shoulder has been bothering her for a couple of weeks after lifting a heavy laundry bag  The pain is getting worse and she has noticed difficulties with driving and it hurts to turn the wheel and she can't reach behind the seat  She has been taking anti-inflammatories and it isn't helping  She says the pain has gotten so bad that she isn't even using the arm anymore and has noticed no numbness or tingling in the arm or fingers  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  No previous injuries  The following portions of the patient's history were reviewed and updated as appropriate: allergies, current medications, past family history, past medical history, past social history, past surgical history and problem list     Review of Systems   Constitutional: Negative  HENT: Negative  Respiratory: Negative  Cardiovascular: Negative  Gastrointestinal: Negative  Musculoskeletal: Negative for back pain  Neurological: Positive for weakness  Negative for numbness  Psychiatric/Behavioral: Negative  Objective:      /82 (BP Location: Left arm, Patient Position: Sitting, Cuff Size: Large)   Pulse 94   Temp 98 2 °F (36 8 °C) (Temporal)   Resp 20   Ht 5' 6" (1 676 m)   Wt 106 kg (234 lb)   SpO2 100%   BMI 37 77 kg/m²          Physical Exam  Vitals reviewed  Constitutional:       Appearance: Normal appearance  HENT:      Head: Normocephalic and atraumatic  Musculoskeletal:         General: Tenderness present  No swelling  Normal range of motion  Comments: ROM wnl of left arm and shoulder; however TTP over left pectoralis along left mid-clavicle, anterior chest, and anterior humerus and patient has pain with abduction and is not able to fully adduct the left arm across the chest   Skin:     General: Skin is warm and dry  Neurological:      General: No focal deficit present  Mental Status: She is alert  Mental status is at baseline  Motor: Weakness (left arm) present     Psychiatric:         Mood and Affect: Mood normal          Behavior: Behavior normal

## 2022-05-10 ENCOUNTER — APPOINTMENT (OUTPATIENT)
Dept: LAB | Facility: AMBULARY SURGERY CENTER | Age: 42
End: 2022-05-10
Payer: COMMERCIAL

## 2022-05-10 DIAGNOSIS — E87.6 HYPOKALEMIA: ICD-10-CM

## 2022-05-10 DIAGNOSIS — D50.8 OTHER IRON DEFICIENCY ANEMIA: ICD-10-CM

## 2022-05-10 LAB
ANION GAP SERPL CALCULATED.3IONS-SCNC: 4 MMOL/L (ref 4–13)
BASOPHILS # BLD AUTO: 0.07 THOUSANDS/ΜL (ref 0–0.1)
BASOPHILS NFR BLD AUTO: 1 % (ref 0–1)
BUN SERPL-MCNC: 15 MG/DL (ref 5–25)
CALCIUM SERPL-MCNC: 8.7 MG/DL (ref 8.3–10.1)
CHLORIDE SERPL-SCNC: 108 MMOL/L (ref 100–108)
CO2 SERPL-SCNC: 29 MMOL/L (ref 21–32)
CREAT SERPL-MCNC: 0.97 MG/DL (ref 0.6–1.3)
EOSINOPHIL # BLD AUTO: 0.18 THOUSAND/ΜL (ref 0–0.61)
EOSINOPHIL NFR BLD AUTO: 3 % (ref 0–6)
ERYTHROCYTE [DISTWIDTH] IN BLOOD BY AUTOMATED COUNT: 18.6 % (ref 11.6–15.1)
GFR SERPL CREATININE-BSD FRML MDRD: 72 ML/MIN/1.73SQ M
GLUCOSE SERPL-MCNC: 123 MG/DL (ref 65–140)
HCT VFR BLD AUTO: 34.8 % (ref 34.8–46.1)
HGB BLD-MCNC: 9.7 G/DL (ref 11.5–15.4)
IMM GRANULOCYTES # BLD AUTO: 0.01 THOUSAND/UL (ref 0–0.2)
IMM GRANULOCYTES NFR BLD AUTO: 0 % (ref 0–2)
LYMPHOCYTES # BLD AUTO: 1.93 THOUSANDS/ΜL (ref 0.6–4.47)
LYMPHOCYTES NFR BLD AUTO: 28 % (ref 14–44)
MCH RBC QN AUTO: 20.3 PG (ref 26.8–34.3)
MCHC RBC AUTO-ENTMCNC: 27.9 G/DL (ref 31.4–37.4)
MCV RBC AUTO: 73 FL (ref 82–98)
MONOCYTES # BLD AUTO: 0.61 THOUSAND/ΜL (ref 0.17–1.22)
MONOCYTES NFR BLD AUTO: 9 % (ref 4–12)
NEUTROPHILS # BLD AUTO: 4.22 THOUSANDS/ΜL (ref 1.85–7.62)
NEUTS SEG NFR BLD AUTO: 59 % (ref 43–75)
NRBC BLD AUTO-RTO: 0 /100 WBCS
PLATELET # BLD AUTO: 525 THOUSANDS/UL (ref 149–390)
PMV BLD AUTO: 9.1 FL (ref 8.9–12.7)
POTASSIUM SERPL-SCNC: 3 MMOL/L (ref 3.5–5.3)
RBC # BLD AUTO: 4.78 MILLION/UL (ref 3.81–5.12)
SODIUM SERPL-SCNC: 141 MMOL/L (ref 136–145)
WBC # BLD AUTO: 7.02 THOUSAND/UL (ref 4.31–10.16)

## 2022-05-10 PROCEDURE — 80048 BASIC METABOLIC PNL TOTAL CA: CPT

## 2022-05-10 PROCEDURE — 85025 COMPLETE CBC W/AUTO DIFF WBC: CPT

## 2022-05-10 PROCEDURE — 36415 COLL VENOUS BLD VENIPUNCTURE: CPT

## 2022-05-12 ENCOUNTER — TELEPHONE (OUTPATIENT)
Dept: FAMILY MEDICINE CLINIC | Facility: CLINIC | Age: 42
End: 2022-05-12

## 2022-05-16 NOTE — TELEPHONE ENCOUNTER
Received call from patient, results and instructions given for lab work  Patient verbalized understanding

## 2022-05-25 ENCOUNTER — TELEPHONE (OUTPATIENT)
Dept: FAMILY MEDICINE CLINIC | Facility: CLINIC | Age: 42
End: 2022-05-25

## 2022-05-25 DIAGNOSIS — N92.6 MISSED PERIOD: Primary | ICD-10-CM

## 2022-05-25 NOTE — TELEPHONE ENCOUNTER
Patient calling, is going to get BMP done next week and is wondering if Sherrell would add a blood test to check for pregnancy, or does she need to be seen first?

## 2022-06-02 ENCOUNTER — APPOINTMENT (OUTPATIENT)
Dept: LAB | Facility: AMBULARY SURGERY CENTER | Age: 42
End: 2022-06-02
Payer: COMMERCIAL

## 2022-06-02 DIAGNOSIS — N92.6 MISSED PERIOD: ICD-10-CM

## 2022-06-02 DIAGNOSIS — E87.6 HYPOKALEMIA: ICD-10-CM

## 2022-06-02 LAB
ANION GAP SERPL CALCULATED.3IONS-SCNC: 9 MMOL/L (ref 4–13)
B-HCG SERPL-ACNC: <2 MIU/ML
BUN SERPL-MCNC: 11 MG/DL (ref 5–25)
CALCIUM SERPL-MCNC: 9.1 MG/DL (ref 8.3–10.1)
CHLORIDE SERPL-SCNC: 107 MMOL/L (ref 100–108)
CO2 SERPL-SCNC: 23 MMOL/L (ref 21–32)
CREAT SERPL-MCNC: 0.8 MG/DL (ref 0.6–1.3)
GFR SERPL CREATININE-BSD FRML MDRD: 91 ML/MIN/1.73SQ M
GLUCOSE SERPL-MCNC: 82 MG/DL (ref 65–140)
POTASSIUM SERPL-SCNC: 4.1 MMOL/L (ref 3.5–5.3)
SODIUM SERPL-SCNC: 139 MMOL/L (ref 136–145)

## 2022-06-02 PROCEDURE — 36415 COLL VENOUS BLD VENIPUNCTURE: CPT

## 2022-06-02 PROCEDURE — 84702 CHORIONIC GONADOTROPIN TEST: CPT

## 2022-06-02 PROCEDURE — 80048 BASIC METABOLIC PNL TOTAL CA: CPT

## 2022-06-09 ENCOUNTER — OFFICE VISIT (OUTPATIENT)
Dept: FAMILY MEDICINE CLINIC | Facility: CLINIC | Age: 42
End: 2022-06-09

## 2022-06-09 VITALS
HEIGHT: 66 IN | WEIGHT: 232.8 LBS | OXYGEN SATURATION: 94 % | DIASTOLIC BLOOD PRESSURE: 90 MMHG | TEMPERATURE: 98.2 F | BODY MASS INDEX: 37.41 KG/M2 | HEART RATE: 105 BPM | SYSTOLIC BLOOD PRESSURE: 132 MMHG

## 2022-06-09 DIAGNOSIS — R11.0 NAUSEA: ICD-10-CM

## 2022-06-09 DIAGNOSIS — M25.512 LEFT ANTERIOR SHOULDER PAIN: Primary | ICD-10-CM

## 2022-06-09 DIAGNOSIS — N83.201 RIGHT OVARIAN CYST: ICD-10-CM

## 2022-06-09 DIAGNOSIS — G44.221 CHRONIC TENSION-TYPE HEADACHE, INTRACTABLE: ICD-10-CM

## 2022-06-09 PROBLEM — Z12.4 CERVICAL CANCER SCREENING: Status: RESOLVED | Noted: 2018-08-17 | Resolved: 2022-06-09

## 2022-06-09 PROBLEM — K04.7 TOOTH INFECTION: Status: RESOLVED | Noted: 2022-03-10 | Resolved: 2022-06-09

## 2022-06-09 PROBLEM — H60.501 ACUTE OTITIS EXTERNA OF RIGHT EAR: Status: RESOLVED | Noted: 2021-12-23 | Resolved: 2022-06-09

## 2022-06-09 PROCEDURE — 99214 OFFICE O/P EST MOD 30 MIN: CPT | Performed by: PHYSICIAN ASSISTANT

## 2022-06-09 RX ORDER — OMEPRAZOLE 20 MG/1
20 CAPSULE, DELAYED RELEASE ORAL
Qty: 30 CAPSULE | Refills: 0 | Status: SHIPPED | OUTPATIENT
Start: 2022-06-09

## 2022-06-09 RX ORDER — RIZATRIPTAN BENZOATE 10 MG/1
10 TABLET, ORALLY DISINTEGRATING ORAL ONCE AS NEEDED
Qty: 9 TABLET | Refills: 3 | Status: SHIPPED | OUTPATIENT
Start: 2022-06-09

## 2022-06-09 RX ORDER — ONDANSETRON 4 MG/1
4 TABLET, ORALLY DISINTEGRATING ORAL EVERY 6 HOURS PRN
Qty: 20 TABLET | Refills: 0 | Status: SHIPPED | OUTPATIENT
Start: 2022-06-09

## 2022-06-09 NOTE — ASSESSMENT & PLAN NOTE
Etiology unknown  No red flags  HCG negative   LMP- current on cycle  Trial of Zofran 4 mg po q6hrs prn also, start Prilosec 20 mg po qd x 2 weeks  F/u in 2-3 weeks

## 2022-06-09 NOTE — ASSESSMENT & PLAN NOTE
Seen on ultrasound 2019 advised to repeats 6-12 weeks  Pt was contacted x 2 for repeat ultrasound   Will reorder pelvic ultrasound

## 2022-06-09 NOTE — PROGRESS NOTES
Assessment/Plan:    Left anterior shoulder pain  Initial injury occurred middle of April after lifting a heavy laundry basket  Order diclofenac gel and Flexeril with temporary relief provided  Pain continues  Likely muscle strain/tear  Was unable to start PT d/t insurance  Will refer to ortho    Nausea  Etiology unknown  No red flags  HCG negative  LMP- current on cycle  Trial of Zofran 4 mg po q6hrs prn also, start Prilosec 20 mg po qd x 2 weeks  F/u in 2-3 weeks    Right ovarian cyst  Seen on ultrasound 2019 advised to repeats 6-12 weeks  Pt was contacted x 2 for repeat ultrasound  Will reorder pelvic ultrasound        Diagnoses and all orders for this visit:    Left anterior shoulder pain  -     Ambulatory Referral to Orthopedic Surgery; Future    Nausea  -     ondansetron (Zofran ODT) 4 mg disintegrating tablet; Take 1 tablet (4 mg total) by mouth every 6 (six) hours as needed for nausea or vomiting  -     omeprazole (PriLOSEC) 20 mg delayed release capsule; Take 1 capsule (20 mg total) by mouth daily before breakfast    Chronic tension-type headache, intractable  -     rizatriptan (MAXALT-MLT) 10 MG disintegrating tablet; Take 1 tablet (10 mg total) by mouth once as needed for migraine for up to 1 dose May repeat in 2 hours if needed    Right ovarian cyst  -     US pelvis complete w transvaginal; Future      Subjective:      Patient ID: Charley Aguilar is a 42 y o  female here today with complaint of nausea since April and continues with left shoulder pain  Nausea  This is a new problem  Episode onset: 3 months  The problem occurs constantly  The problem has been gradually worsening  Associated symptoms include nausea  Pertinent negatives include no abdominal pain, change in bowel habit, congestion, coughing, fatigue, fever, headaches, vertigo, visual change, vomiting or weakness  The symptoms are aggravated by eating  Treatments tried: ginger tea  The treatment provided moderate relief       She assumed she was pregnant since she began having nausea and missed her period in May  Her menstrual cycle started 6/7/2022  She reports a history of irregular periods since she started her menses at age 14  Has missed cycles in the past  Pt is sexually active  HCG drawn 6/2/2022- negative  Continue with left shoulder pain  She reports no change since initially injury  Has pain with reaching, pushing, and rotating shoulder  Was prescribed cyclobenzaprine and diclofenac gel which provided temporary relief  The following portions of the patient's history were reviewed and updated as appropriate: allergies, current medications, past family history, past medical history, past social history, past surgical history and problem list     Review of Systems   Constitutional: Negative for fatigue and fever  HENT: Negative for congestion  Respiratory: Negative for cough  Gastrointestinal: Positive for nausea  Negative for abdominal pain, change in bowel habit, constipation, diarrhea, rectal pain and vomiting  Neurological: Negative for vertigo, weakness and headaches  All other systems reviewed and are negative  Objective:      /90 (BP Location: Left arm, Patient Position: Sitting, Cuff Size: Large)   Pulse 105   Temp 98 2 °F (36 8 °C) (Temporal)   Ht 5' 6" (1 676 m)   Wt 106 kg (232 lb 12 8 oz)   SpO2 94%   BMI 37 57 kg/m²          Physical Exam  Constitutional:       General: She is not in acute distress  Appearance: Normal appearance  She is well-developed  Cardiovascular:      Rate and Rhythm: Normal rate and regular rhythm  Heart sounds: Normal heart sounds  No murmur heard  Pulmonary:      Effort: Pulmonary effort is normal  No respiratory distress  Breath sounds: Normal breath sounds  No wheezing  Abdominal:      General: Bowel sounds are normal  There is no distension  Palpations: Abdomen is soft  There is no mass  Tenderness: There is no abdominal tenderness  There is no guarding or rebound  Musculoskeletal:         General: No deformity  Left shoulder: Tenderness (Rotator cuff tendons) present  No swelling, deformity or laceration  Normal range of motion  Normal strength  Lymphadenopathy:      Cervical: No cervical adenopathy  Neurological:      Mental Status: She is alert and oriented to person, place, and time  Psychiatric:         Mood and Affect: Mood normal          Behavior: Behavior normal          Thought Content:  Thought content normal

## 2022-06-09 NOTE — ASSESSMENT & PLAN NOTE
Initial injury occurred middle of April after lifting a heavy laundry basket  Order diclofenac gel and Flexeril with temporary relief provided  Pain continues  Likely muscle strain/tear  Was unable to start PT d/t insurance   Will refer to ortho

## 2022-06-10 ENCOUNTER — TELEPHONE (OUTPATIENT)
Dept: OBGYN CLINIC | Facility: MEDICAL CENTER | Age: 42
End: 2022-06-10

## 2022-06-10 NOTE — TELEPHONE ENCOUNTER
Sandrine Cruz at [facility] calling to schedule appt with patient, patient could not remember dr name she was to schedule, she wanted to call back once she got home

## 2022-06-17 ENCOUNTER — HOSPITAL ENCOUNTER (OUTPATIENT)
Dept: RADIOLOGY | Facility: HOSPITAL | Age: 42
Discharge: HOME | End: 2022-06-17
Payer: COMMERCIAL

## 2022-06-17 DIAGNOSIS — N83.201 RIGHT OVARIAN CYST: ICD-10-CM

## 2022-06-17 PROCEDURE — 76830 TRANSVAGINAL US NON-OB: CPT

## 2022-06-17 PROCEDURE — 76856 US EXAM PELVIC COMPLETE: CPT

## 2022-07-06 DIAGNOSIS — M25.512 LEFT SHOULDER PAIN, UNSPECIFIED CHRONICITY: Primary | ICD-10-CM

## 2022-07-14 ENCOUNTER — OFFICE VISIT (OUTPATIENT)
Dept: OBGYN CLINIC | Facility: OTHER | Age: 42
End: 2022-07-14
Payer: COMMERCIAL

## 2022-07-14 ENCOUNTER — APPOINTMENT (OUTPATIENT)
Dept: RADIOLOGY | Facility: OTHER | Age: 42
End: 2022-07-14
Payer: COMMERCIAL

## 2022-07-14 VITALS
DIASTOLIC BLOOD PRESSURE: 85 MMHG | HEART RATE: 84 BPM | HEIGHT: 66 IN | BODY MASS INDEX: 37.28 KG/M2 | WEIGHT: 232 LBS | SYSTOLIC BLOOD PRESSURE: 138 MMHG

## 2022-07-14 DIAGNOSIS — M25.512 LEFT SHOULDER PAIN, UNSPECIFIED CHRONICITY: ICD-10-CM

## 2022-07-14 DIAGNOSIS — M24.812 INTERNAL DERANGEMENT OF LEFT SHOULDER: Primary | ICD-10-CM

## 2022-07-14 PROCEDURE — 73030 X-RAY EXAM OF SHOULDER: CPT

## 2022-07-14 PROCEDURE — 99204 OFFICE O/P NEW MOD 45 MIN: CPT | Performed by: ORTHOPAEDIC SURGERY

## 2022-07-14 NOTE — PROGRESS NOTES
Assessment  Diagnoses and all orders for this visit:    Internal derangement of left shoulder    Discussion and Plan:    · Patient has a history of an acute injury about 6 weeks ago with continued pain and weakness  We will order an MRI of her left shoulder due to these examination findings to further evaluate the rotator cuff vs other internal derangement  · In parallel, she will begin formal physical therapy/HEP for strengthening and stretching exercises as tolerated  · OTC meds and ice prn for pain relief  · Follow up after MRI for discussion of results and further treatment options based on these results  Subjective:   Patient ID: El Howell is a 42 y o  female    The patient presents today for an orthopedic surgery consultation visit at the request of Ananya Platt PA-C on 6/9/2022 with a chief complaint of left shoulder pain  The pain began 6 week(s) ago and is associated with an acute injury  Patient reports that she was lifting a laundry basket and had immediate pain about the shoulder  The patient describes the pain as aching and dull in intensity,  intermittent, occurring with increasing frequency in timing, and localizes the pain to the  left subacromial joint, biceps tendon  The pain is worse with overhead work, overuse and raising arm over head and relieved by rest, ice, avoiding the painful activities  The pain is not associated with numbness and tingling  The pain is not associated with constitutional symptoms  The patient is not awoken at night by the pain  The patient has had no prior treatment      The following portions of the patient's history were reviewed and updated as appropriate: allergies, current medications, past family history, past medical history, past social history, past surgical history and problem list     Objective:  /85 (BP Location: Right arm, Patient Position: Sitting, Cuff Size: Adult)   Pulse 84   Ht 5' 6" (1 676 m)   Wt 105 kg (232 lb) BMI 37 45 kg/m²       Left Shoulder Exam     Range of Motion   The patient has normal left shoulder ROM  Muscle Strength   Abduction: 4/5   External rotation: 5/5     Tests   Aranda test: positive  Impingement: positive    Other   Erythema: absent  Sensation: normal  Pulse: present     Comments:  (+) Speed's Test            Physical Exam  Constitutional:       Appearance: She is well-developed  Eyes:      Pupils: Pupils are equal, round, and reactive to light  Pulmonary:      Effort: Pulmonary effort is normal       Breath sounds: Normal breath sounds  Skin:     General: Skin is warm and dry  Neurological:      Mental Status: She is alert and oriented to person, place, and time  Psychiatric:         Behavior: Behavior normal          Thought Content: Thought content normal          Judgment: Judgment normal            I have personally reviewed pertinent films in PACS and my interpretation is as follows  X Ray Left Shoulder 7/14/2022: No acute osseous abnormalities or degenerative changes       Scribe Attestation    I,:  aDve Francois am acting as a scribe while in the presence of the attending physician :       I,:  Ted Craft MD personally performed the services described in this documentation    as scribed in my presence :

## 2022-07-15 ENCOUNTER — OFFICE VISIT (OUTPATIENT)
Dept: OBGYN CLINIC | Facility: CLINIC | Age: 42
End: 2022-07-15
Payer: COMMERCIAL

## 2022-07-15 VITALS
HEIGHT: 66 IN | BODY MASS INDEX: 37.61 KG/M2 | WEIGHT: 234 LBS | DIASTOLIC BLOOD PRESSURE: 86 MMHG | SYSTOLIC BLOOD PRESSURE: 128 MMHG

## 2022-07-15 DIAGNOSIS — N92.1 MENORRHAGIA WITH IRREGULAR CYCLE: ICD-10-CM

## 2022-07-15 DIAGNOSIS — D25.2 INTRAMURAL AND SUBSEROUS LEIOMYOMA OF UTERUS: Primary | ICD-10-CM

## 2022-07-15 DIAGNOSIS — D25.1 INTRAMURAL AND SUBSEROUS LEIOMYOMA OF UTERUS: Primary | ICD-10-CM

## 2022-07-15 PROCEDURE — 99214 OFFICE O/P EST MOD 30 MIN: CPT | Performed by: OBSTETRICS & GYNECOLOGY

## 2022-07-15 NOTE — PROGRESS NOTES
Assessment/Plan:   Diagnoses and all orders for this visit:    Intramural and subserous leiomyoma of uterus  - reviewed US images and positions of fibroids  Discussed that her fibroids are likely to be contributing to her increased pain during menses  Menorrhagia with irregular cycle    - recommended for EMB to be performed for evaluation of heavy menstrual bleeding  Briefly discussed possible treatments for bleeding including LNG-IUD or other progesterone only options as she has a history of migraine with aura and estrogen containing medications would be contraindicated  Also mentioned that other surgical options are possible including endometrial ablation or definitively hysterectomy if she is not amenable to hormonal medication to help control her bleeding  Reviewed that no matter what treatment option is pursued, recommendation is for EMB to be performed first to allow for evaluation of the uterine tissue and rule out atypia/malignancy and other pathology causes for abnormal bleeding  Patient agreeable  Follow up for EMB  Subjective:    Patient ID: Debbie Morillo is a 42 y o  female  Chief Complaint   Patient presents with   [facility] pt     Fibroids     Wants to discuss fibroids found on recent US, c/o painful and heavy periods due to fibroids  Yusef Santanaibaldi presents today as a new patient for a gyn consultation regarding her uterine fibroids noted on US  She reports a history of irregular and heavy menstrual bleeding that has been worsening  Her US did not 3 different fibroids located intramural and subserosal positions all of which have grown in size from 1-2 cm to now 3-4 cm diameter of the largest fibroid  She has not seen a gyn in a few years and is not currently on any form of hormonal medication or contraception           The following portions of the patient's history were reviewed and updated as appropriate: allergies, current medications, past family history, past medical history, past social history, past surgical history and problem list     Review of Systems   Constitutional: Negative for chills and fever  Eyes: Negative for visual disturbance  Respiratory: Negative for cough and shortness of breath  Cardiovascular: Negative for chest pain  Gastrointestinal: Negative for anal bleeding, constipation, diarrhea, nausea and vomiting  Genitourinary: Positive for menstrual problem (heavy menstrual bleeding, irregular menses) and pelvic pain  Negative for difficulty urinating, dyspareunia, flank pain, vaginal bleeding, vaginal discharge and vaginal pain  Musculoskeletal: Negative for back pain  Neurological: Negative for dizziness, weakness and headaches  Objective:  /86   Ht 5' 6" (1 676 m)   Wt 106 kg (234 lb)   LMP 06/22/2022 (Exact Date)   Breastfeeding No   BMI 37 77 kg/m²   Physical Exam  Constitutional:       General: She is not in acute distress  Appearance: Normal appearance  She is not diaphoretic  HENT:      Head: Normocephalic and atraumatic  Pulmonary:      Effort: Pulmonary effort is normal  No respiratory distress  Musculoskeletal:      Right lower leg: No edema  Left lower leg: No edema  Neurological:      Mental Status: She is alert and oriented to person, place, and time  Skin:     General: Skin is warm and dry  Coloration: Skin is not pale  Psychiatric:         Mood and Affect: Mood normal          Behavior: Behavior normal          Thought Content: Thought content normal          Judgment: Judgment normal    Vitals and nursing note reviewed

## 2022-08-04 ENCOUNTER — PROCEDURE VISIT (OUTPATIENT)
Dept: OBGYN CLINIC | Facility: CLINIC | Age: 42
End: 2022-08-04
Payer: COMMERCIAL

## 2022-08-04 VITALS
WEIGHT: 234.8 LBS | HEIGHT: 67 IN | BODY MASS INDEX: 36.85 KG/M2 | DIASTOLIC BLOOD PRESSURE: 98 MMHG | SYSTOLIC BLOOD PRESSURE: 130 MMHG

## 2022-08-04 DIAGNOSIS — Z32.02 NEGATIVE PREGNANCY TEST: ICD-10-CM

## 2022-08-04 DIAGNOSIS — N92.1 MENORRHAGIA WITH IRREGULAR CYCLE: ICD-10-CM

## 2022-08-04 DIAGNOSIS — N93.9 ABNORMAL UTERINE BLEEDING (AUB): Primary | ICD-10-CM

## 2022-08-04 LAB — SL AMB POCT URINE HCG: NEGATIVE

## 2022-08-04 PROCEDURE — 88305 TISSUE EXAM BY PATHOLOGIST: CPT | Performed by: PATHOLOGY

## 2022-08-04 PROCEDURE — 81025 URINE PREGNANCY TEST: CPT | Performed by: OBSTETRICS & GYNECOLOGY

## 2022-08-04 PROCEDURE — 58100 BIOPSY OF UTERUS LINING: CPT | Performed by: OBSTETRICS & GYNECOLOGY

## 2022-08-04 NOTE — PROGRESS NOTES
Endometrial biopsy    Date/Time: 8/4/2022 3:05 PM  Performed by: Reginald Rich MD  Authorized by: Reginald Rich MD   Universal Protocol:  Consent: Verbal consent obtained  Written consent obtained  Risks and benefits: risks, benefits and alternatives were discussed (uterine perforation, cramping, bleeding, insufficent sampling, need for repeat procedure or OR procedure)  Consent given by: patient  Patient understanding: patient states understanding of the procedure being performed  Patient consent: the patient's understanding of the procedure matches consent given  Procedure consent: procedure consent matches procedure scheduled  Relevant documents: relevant documents present and verified  Required items: required blood products, implants, devices, and special equipment available  Patient identity confirmed: verbally with patient      Indication:     Indications: Other disorder of menstruation and other abnormal bleeding from female genital tract    Pre-procedure:     Premeds:  Ibuprofen  Procedure:     Procedure: endometrial biopsy with Pipelle      A bivalve speculum was placed in the vagina: yes      Cervix cleaned and prepped: yes      The cervix was dilated: no      Uterus sounded: yes      Uterus sound depth (cm):  11    Specimen collected: specimen collected and sent to pathology      Patient tolerated procedure well with no complications: yes    Findings:     Uterus size:  9-10 weeks    Cervix: normal      1  Abnormal uterine bleeding (AUB)  - Tissue Exam  - POCT urine HCG  - Endometrial biopsy    2  Negative pregnancy test  - POCT urine HCG    3  Menorrhagia with irregular cycle  - Endometrial biopsy    Discussed with patient again treatment options for her heavy menstrual bleeding  Ana Childress is leaning more toward the IUD to help with bleeding and in turn help reduce pain with menses   Discussed minimal side effects with migraines but also reversibility and ability to remove IUD if migraine frequency increases  Will await EMB results and then plan for IUD placement

## 2022-08-05 ENCOUNTER — HOSPITAL ENCOUNTER (OUTPATIENT)
Dept: RADIOLOGY | Age: 42
Discharge: HOME | End: 2022-08-05
Payer: COMMERCIAL

## 2022-08-05 DIAGNOSIS — M24.812 INTERNAL DERANGEMENT OF LEFT SHOULDER: ICD-10-CM

## 2022-08-05 PROCEDURE — G1004 CDSM NDSC: HCPCS

## 2022-08-05 PROCEDURE — 73221 MRI JOINT UPR EXTREM W/O DYE: CPT

## 2022-08-18 ENCOUNTER — OFFICE VISIT (OUTPATIENT)
Dept: OBGYN CLINIC | Facility: OTHER | Age: 42
End: 2022-08-18
Payer: COMMERCIAL

## 2022-08-18 VITALS
SYSTOLIC BLOOD PRESSURE: 130 MMHG | HEIGHT: 67 IN | HEART RATE: 87 BPM | WEIGHT: 237 LBS | DIASTOLIC BLOOD PRESSURE: 84 MMHG | BODY MASS INDEX: 37.2 KG/M2

## 2022-08-18 DIAGNOSIS — S46.011D TRAUMATIC INCOMPLETE TEAR OF RIGHT ROTATOR CUFF, SUBSEQUENT ENCOUNTER: Primary | ICD-10-CM

## 2022-08-18 PROBLEM — S46.011A TRAUMATIC INCOMPLETE TEAR OF RIGHT ROTATOR CUFF: Status: ACTIVE | Noted: 2022-08-18

## 2022-08-18 PROCEDURE — 99214 OFFICE O/P EST MOD 30 MIN: CPT | Performed by: ORTHOPAEDIC SURGERY

## 2022-08-18 NOTE — PROGRESS NOTES
I personally examined the patient and reviewed the history provided  I agree with the note and the assessment and plan by Dr Kitty Sicard, MD      Assessment:    Left partial-thickness articular sided supraspinatus tendon tear    Plan:    I reviewed the MRI findings with the patient that there is not in acute rotator cuff tear that requires surgical treatment at this time indeed the imaging shows a small partial articular sided supraspinatus tendon tear which should be managed appropriately non operatively  I did offer the patient a corticosteroid injection which she politely declined and she will continue with physical therapy as she did not wish to continue until the results of the MRI were reviewed  We will check her progress in 6 to 8 weeks and consider an injection at that time if her symptoms persist        Assessment  Diagnoses and all orders for this visit:    Traumatic incomplete tear of left rotator cuff, initial encounter  -     Ambulatory Referral to Physical Therapy; Future        Discussion and Plan:    The discussion was had with the patient regarding her left shoulder pain  Results of the MRI of her left shoulder were discussed with the patient, which demonstrated a partial articular sided tear of the distal aspect of the supraspinatus tendon  Risks and benefits of an injection for the left shoulder pain were explained to the patient, the patient decided to decline at this time  Patient is advised at this condition did not require surgical intervention  Patient was recommended to return to physical therapy and continue non operative management for now  Subjective:   Patient ID: Anthony Gil is a 42 y o  female      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  She states that about 2 months ago, she noticed immediate pain to her left shoulder when lifting a heavy laundry basket  She has seen Dr Reena Marley previously for management of this condition    Non operative management was recommended at that time, in the form of physical therapy  Patient states that she attended 1 physical therapy session, although because it caused her significant pain, she stopped going  Patient has continued to have pain over the left side shoulder, which does not bother her at night and while performing any kind of overhead motion of the left shoulder  Patient is here for further management of her left shoulder condition, as well as to review the results of the MRI  The following portions of the patient's history were reviewed and updated as appropriate: allergies, current medications, past family history, past medical history, past social history, past surgical history and problem list     Review of Systems   All other systems reviewed and are negative  Objective:  /84 (BP Location: Right arm, Patient Position: Sitting, Cuff Size: Adult)   Pulse 87   Ht 5' 6 5" (1 689 m)   Wt 108 kg (237 lb)   BMI 37 68 kg/m²       Left Shoulder Exam     Tenderness   Left shoulder tenderness location: anterior aspect of shoulder  Range of Motion   Active abduction: 90   Extension: 30   External rotation: 30   Forward flexion: 130   Internal rotation 0 degrees: normal     Muscle Strength   Abduction: 4/5   Internal rotation: 5/5   External rotation: 5/5   Supraspinatus: 4/5     Tests   Aranda test: positive  Impingement: positive    Other   Sensation: normal  Pulse: present             Physical Exam  Vitals reviewed  Constitutional:       Appearance: Normal appearance  HENT:      Head: Normocephalic and atraumatic  Mouth/Throat:      Mouth: Mucous membranes are moist    Eyes:      Pupils: Pupils are equal, round, and reactive to light  Cardiovascular:      Pulses: Normal pulses  Pulmonary:      Effort: Pulmonary effort is normal    Abdominal:      General: Abdomen is flat  Palpations: Abdomen is soft  Skin:     General: Skin is warm and dry        Capillary Refill: Capillary refill takes less than 2 seconds  Neurological:      General: No focal deficit present  Mental Status: She is alert and oriented to person, place, and time  Psychiatric:         Mood and Affect: Mood normal          Behavior: Behavior normal            I have personally reviewed pertinent films in PACS and my interpretation is as follows  MRI of the left shoulder demonstrated partial articular sided supraspinatus tendon tear on the left shoulder

## 2022-08-18 NOTE — RESULT ENCOUNTER NOTE
Endometrium with proliferative endometrium  No evidence of hyperplasia (overgrowth of lining), atypia, and malignancy  Please call Rebeca to review normal results  If interested, can proceed with IUD placement to help with heavy bleeding  Procedure appointment needed

## 2022-08-26 ENCOUNTER — EVALUATION (OUTPATIENT)
Dept: PHYSICAL THERAPY | Age: 42
End: 2022-08-26
Payer: COMMERCIAL

## 2022-08-26 ENCOUNTER — TELEPHONE (OUTPATIENT)
Dept: OBGYN CLINIC | Facility: HOSPITAL | Age: 42
End: 2022-08-26

## 2022-08-26 DIAGNOSIS — S46.011D TRAUMATIC INCOMPLETE TEAR OF RIGHT ROTATOR CUFF, SUBSEQUENT ENCOUNTER: ICD-10-CM

## 2022-08-26 PROCEDURE — 97112 NEUROMUSCULAR REEDUCATION: CPT | Performed by: PHYSICAL THERAPIST

## 2022-08-26 PROCEDURE — 97161 PT EVAL LOW COMPLEX 20 MIN: CPT | Performed by: PHYSICAL THERAPIST

## 2022-08-26 NOTE — TELEPHONE ENCOUNTER
Patient would like to know if she can do light duty, and if so she would need a work note      Please Advise  CB: [phone]

## 2022-08-26 NOTE — PROGRESS NOTES
PT Evaluation     Today's date: 2022  Patient name: Sameera Sanchez  : 1980  MRN: [id number]  Referring provider: Trell Hernandez MD  Dx:   Encounter Diagnosis     ICD-10-CM    1  Traumatic incomplete tear of right rotator cuff, subsequent encounter  S46 011D Ambulatory Referral to Physical Therapy                  Assessment  Assessment details: Patient presents complaining of L shoulder pain, which has been ongoing for about three months when she was picking up a laundry bag and began having immediate pain  She does not remember the exact date of injury  She went to the MD the following week and then was referred to an ortho doctor  She reports the pain is worse with any use of her arm, such as reaching into her backseat, as well as pushing  She locates her pain to the anterior aspect of her L shoulder, with some pain into her scapula  She denies pain down her L UE at this time  She had an MRI completed which demonstrated a mild supraspinatus tear     She is currently working as a nurse     Patient presents with limitations in L shoulder range of motion and strength consistent with a mild RTC tear  Patient would benefit from skilled physical therapy to address limitations and deficits  Patient provided with HEP  Patient made aware of condition as well as the proposed treatment plan, including risks, benefits and alternatives  Impairments: abnormal or restricted ROM, activity intolerance, impaired physical strength, lacks appropriate home exercise program and pain with function     Prognosis: good    Goals  ST- demonstrate compliancy with HEP in 1 week     Decrease reported pain to 5/10 at worst and with activity, to improve functional capacity, within 3 weeks   Improve L shoulder range of motion, within 3 weeks     LT- Improve FOTO score to specified value to improve patients perceived benefit of therapy, in 8 weeks   Improve L shoulder strength to 4+/5, to improve ability to complete ADL's, within 8 weeks   Complete full day at work with no complaints of pain, within 10 weeks     Plan  Plan details: Physical therapy with focus on there ex and manual therapy to improve ability to complete tasks around the house and complete functional activities, use of modalities as needed     Patient would benefit from: skilled physical therapy  Referral necessary: No  Planned modality interventions: cryotherapy, TENS and thermotherapy: hydrocollator packs  Planned therapy interventions: ADL training, balance, balance/weight bearing training, gait training, manual therapy, joint mobilization, neuromuscular re-education, strengthening, stretching, therapeutic activities and therapeutic exercise  Frequency: 2x week  Duration in weeks: 12  Plan of Care beginning date: 2022  Plan of Care expiration date: 2022  Treatment plan discussed with: patient        Subjective Evaluation    History of Present Illness  Mechanism of injury: Lifting up a laundry basket  Pain  Current pain ratin  At best pain ratin  At worst pain rating: 10  Location: L anterior shoulder   Quality: sharp  Relieving factors: relaxation and rest  Aggravating factors: lifting and overhead activity  Progression: no change      Diagnostic Tests  No diagnostic tests performed  Treatments  No previous or current treatments  Patient Goals  Patient goals for therapy: decreased pain and independence with ADLs/IADLs          Objective     General Comments:      Shoulder Comments   Ttp along anterior aspect of her L shoulder      rom   Shoulder  Flexion L=~100 R=WFL  Abduction L=~85 R=WFL  ER scratch L=T3* R=T2  IR scratch L=T10* R=T8    mmt  Shoulder  Flexion L=4-* R=4+  Abduction L=3* R=4+  ER L=3+* R=4  IR L=4-* R=4    Special tests  (+) painful arc, [place]     Joint play not assessed              Precautions: none       Manuals             L shoulder ROM gentle                                                     Neuro Re-Ed S/L flexion             S/L abduction             S/L ER             Prone I,Y,T             rows                                       Ther Ex             Pulleys              Finger ladder 2way flex            Railing slides 2way                                                                              Ther Activity                                       Gait Training                                       Modalities

## 2022-09-02 ENCOUNTER — OFFICE VISIT (OUTPATIENT)
Dept: PHYSICAL THERAPY | Age: 42
End: 2022-09-02
Payer: COMMERCIAL

## 2022-09-02 DIAGNOSIS — S46.011D TRAUMATIC INCOMPLETE TEAR OF RIGHT ROTATOR CUFF, SUBSEQUENT ENCOUNTER: Primary | ICD-10-CM

## 2022-09-02 PROCEDURE — 97110 THERAPEUTIC EXERCISES: CPT

## 2022-09-02 PROCEDURE — 97140 MANUAL THERAPY 1/> REGIONS: CPT

## 2022-09-02 NOTE — PROGRESS NOTES
Daily Note     Today's date: 2022  Patient name: Bettina Flores  : 1980  MRN: [id number]  Referring provider: Sarita Timmons MD  Dx:   Encounter Diagnosis     ICD-10-CM    1  Traumatic incomplete tear of right rotator cuff, subsequent encounter  S46 011D                   Subjective: pt reports pain with HEP, pt reports sleeping difficulty due to pain, pt reports her L shoulder pain has increased in past week due to HEP, pt states she may get injection if needed (as per ortho)      Objective: See treatment diary below      Assessment: pt with increased pain L shoulder which limited ex progressions today, encouraged pt nico follow HEP as directed and cont with ice L shoulder and also meds as directed by Dr for pain      Plan: Continue per plan of care  Progress treatment as tolerated  Precautions: none       Manuals 22            L shoulder ROM gentle  VK                                                   Neuro Re-Ed             S/L flexion NP            S/L abduction NP            S/L ER NP            Prone I,Y,T NP            rows NP            Gentle/ sub-max isometrics flex/add 10x5" ea                         Ther Ex             Pulleys  2'            Finger ladder 2way flex            Railing slides 2way PAIN!             Table slides flex/scap 10x10" ea                                                                Ther Activity                                       Gait Training                                       Modalities 22            Ice post ex 10'

## 2022-09-06 ENCOUNTER — OFFICE VISIT (OUTPATIENT)
Dept: FAMILY MEDICINE CLINIC | Facility: CLINIC | Age: 42
End: 2022-09-06

## 2022-09-06 VITALS
SYSTOLIC BLOOD PRESSURE: 138 MMHG | DIASTOLIC BLOOD PRESSURE: 100 MMHG | HEIGHT: 67 IN | OXYGEN SATURATION: 100 % | TEMPERATURE: 97.4 F | HEART RATE: 88 BPM | WEIGHT: 232.6 LBS | BODY MASS INDEX: 36.51 KG/M2 | RESPIRATION RATE: 18 BRPM

## 2022-09-06 DIAGNOSIS — M25.512 LEFT ANTERIOR SHOULDER PAIN: Primary | ICD-10-CM

## 2022-09-06 PROCEDURE — 99213 OFFICE O/P EST LOW 20 MIN: CPT | Performed by: FAMILY MEDICINE

## 2022-09-06 NOTE — ASSESSMENT & PLAN NOTE
-Patient with  Left partial-thickness articular sided supraspinatus tendon tear  She follows closely with orthopedic (Dr Savage Mt)  -patient prefers nonsurgical option for treatment of rotator cuff tear  She has not continued with physical therapy due to pain  Reports that use of NSAIDs and ice pack helps  Pain is 2/10 today  Still with limited range of motion, request work excuse with light duties  Work note provided    - Advised to follow-up with orthopedic

## 2022-09-06 NOTE — LETTER
September 6, 2022     Patient: Irma Garcia  YOB: 1980  Date of Visit: 9/6/2022      To Whom it May Concern:    Irma Garcia is under my professional care  Dheeraj Medina was seen in my office on 9/6/2022  Dheeraj Medina may return to work with light duties due to  tear of right rotator cuff  If you have any questions or concerns, please don't hesitate to call           Sincerely,          Curly Mullen MD        CC:   No Recipients

## 2022-09-06 NOTE — PROGRESS NOTES
Assessment/Plan:    Left anterior shoulder pain  -Patient with  Left partial-thickness articular sided supraspinatus tendon tear  She follows closely with orthopedic (Dr Iona Gonzalez)  -patient prefers nonsurgical option for treatment of rotator cuff tear  She has not continued with physical therapy due to pain  Reports that use of NSAIDs and ice pack helps  Pain is 2/10 today  Still with limited range of motion, request work excuse with light duties  Work note provided  - Advised to follow-up with orthopedic       Diagnoses and all orders for this visit:    Left anterior shoulder pain          Subjective:      Patient ID: Candelario Bridges is a 42 y o  female  Patient presents to the clinic today follow-up of left rotator cuff injury  She has a follow-up with Orthopedics  However is requesting work excuse with limited use of right shoulder/hand due to injury  She works as a nurse, 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 due to limitation and limited range of motion  Today, she states the pain is a 2/10 is using ibuprofen and ice pack  She denies any other acute complaints or concerns    The following portions of the patient's history were reviewed and updated as appropriate: allergies, current medications, past family history, past medical history, past social history, past surgical history and problem list     Review of Systems   Constitutional: Negative for chills and fever  HENT: Negative for ear pain and sore throat  Eyes: Negative for pain and visual disturbance  Respiratory: Negative for cough and shortness of breath  Cardiovascular: Negative for chest pain and palpitations  Gastrointestinal: Negative for abdominal pain and vomiting  Genitourinary: Negative for dysuria and hematuria  Musculoskeletal: Positive for arthralgias  Negative for back pain  Left shoulder pain   Skin: Negative for color change and rash  Neurological: Negative for seizures and syncope     All other systems reviewed and are negative  Objective:      /100 (BP Location: Left arm, Patient Position: Sitting, Cuff Size: Large)   Pulse 88   Temp (!) 97 4 °F (36 3 °C) (Temporal)   Resp 18   Ht 5' 6 5" (1 689 m)   Wt 106 kg (232 lb 9 6 oz)   SpO2 100%   BMI 36 98 kg/m²          Physical Exam  Vitals reviewed  Constitutional:       General: She is not in acute distress  Appearance: Normal appearance  She is obese  She is not ill-appearing, toxic-appearing or diaphoretic  HENT:      Head: Normocephalic and atraumatic  Eyes:      General:         Right eye: No discharge  Left eye: No discharge  Conjunctiva/sclera: Conjunctivae normal    Cardiovascular:      Rate and Rhythm: Normal rate and regular rhythm  Pulses: Normal pulses  Heart sounds: Normal heart sounds  Pulmonary:      Effort: Pulmonary effort is normal  No respiratory distress  Breath sounds: Normal breath sounds  No wheezing  Abdominal:      General: Abdomen is flat  Bowel sounds are normal  There is no distension  Palpations: Abdomen is soft  Tenderness: There is no abdominal tenderness  There is no guarding  Musculoskeletal:         General: Signs of injury present  Right lower leg: No edema  Left lower leg: No edema  Comments: Deferred exam due to pain   Skin:     General: Skin is warm and dry  Neurological:      Mental Status: She is alert  Mental status is at baseline  Psychiatric:         Mood and Affect: Mood normal          Behavior: Behavior normal          Thought Content:  Thought content normal          Judgment: Judgment normal

## 2022-09-09 ENCOUNTER — APPOINTMENT (OUTPATIENT)
Dept: PHYSICAL THERAPY | Age: 42
End: 2022-09-09
Payer: COMMERCIAL

## 2022-09-16 ENCOUNTER — APPOINTMENT (OUTPATIENT)
Dept: PHYSICAL THERAPY | Age: 42
End: 2022-09-16
Payer: COMMERCIAL

## 2022-09-19 ENCOUNTER — OFFICE VISIT (OUTPATIENT)
Dept: OBGYN CLINIC | Facility: OTHER | Age: 42
End: 2022-09-19
Payer: COMMERCIAL

## 2022-09-19 VITALS
DIASTOLIC BLOOD PRESSURE: 90 MMHG | WEIGHT: 232 LBS | BODY MASS INDEX: 36.41 KG/M2 | SYSTOLIC BLOOD PRESSURE: 137 MMHG | HEIGHT: 67 IN | HEART RATE: 101 BPM

## 2022-09-19 DIAGNOSIS — S46.012D TRAUMATIC INCOMPLETE TEAR OF LEFT ROTATOR CUFF, SUBSEQUENT ENCOUNTER: Primary | ICD-10-CM

## 2022-09-19 PROBLEM — S46.012A TRAUMATIC INCOMPLETE TEAR OF LEFT ROTATOR CUFF: Status: ACTIVE | Noted: 2022-08-18

## 2022-09-19 PROCEDURE — 99214 OFFICE O/P EST MOD 30 MIN: CPT | Performed by: ORTHOPAEDIC SURGERY

## 2022-09-19 PROCEDURE — 20610 DRAIN/INJ JOINT/BURSA W/O US: CPT | Performed by: ORTHOPAEDIC SURGERY

## 2022-09-19 RX ORDER — BETAMETHASONE SODIUM PHOSPHATE AND BETAMETHASONE ACETATE 3; 3 MG/ML; MG/ML
6 INJECTION, SUSPENSION INTRA-ARTICULAR; INTRALESIONAL; INTRAMUSCULAR; SOFT TISSUE
Status: COMPLETED | OUTPATIENT
Start: 2022-09-19 | End: 2022-09-19

## 2022-09-19 RX ORDER — BUPIVACAINE HYDROCHLORIDE 2.5 MG/ML
2 INJECTION, SOLUTION INFILTRATION; PERINEURAL
Status: COMPLETED | OUTPATIENT
Start: 2022-09-19 | End: 2022-09-19

## 2022-09-19 RX ADMIN — BETAMETHASONE SODIUM PHOSPHATE AND BETAMETHASONE ACETATE 6 MG: 3; 3 INJECTION, SUSPENSION INTRA-ARTICULAR; INTRALESIONAL; INTRAMUSCULAR; SOFT TISSUE at 10:41

## 2022-09-19 RX ADMIN — BUPIVACAINE HYDROCHLORIDE 2 ML: 2.5 INJECTION, SOLUTION INFILTRATION; PERINEURAL at 10:41

## 2022-09-19 NOTE — PROGRESS NOTES
I personally examined the patient and reviewed the history provided  I agree with the note and the assessment and plan by Dr Jonathon Holcomb MD      Assessment:    Left partial-thickness supraspinatus tendon tear    Plan:    Patient was provided with a subacromial injection for symptomatic relief of her left shoulder pain and will continue with physical therapy  We will see her back on an as-needed basis, we can repeat the injection 4 months or consider surgical intervention if her symptoms persist           Assessment    Traumatic incomplete tear of left rotator cuff    - Subacromial CSI today     Discussion and Plan:    The patient received a subacromial injection for her left rotator cuff tear due to her lack of symptomatic improvement with physical therapy  The injection went well and she was instructed to follow up as she needs based on her symptoms  She was encouraged to continue physical therapy  Her injury requires her to be light duty at work at this time until she is able to function to her full capacity  All questions were discussed with the patient to her satisfaction  She will see her primary care physician for FMLA paperwork        Subjective:   Patient ID: Darnell Oscar is a 36 y o  female      Darnell Oscar presents to the clinic today for a follow up of her left rotator cuff tear  She has undergone physical therapy and reports her pain worsened with therapy  The pain is localized to her shoulder and does not radiate  She is denies new injury and denies numbness or tingling to her left upper extremity  She reports she will speak to her primary care physician regarding FMLA paperwork due to the injury affecting her ability to perform her regular duties at work             The following portions of the patient's history were reviewed and updated as appropriate: allergies, current medications, past family history, past medical history, past social history, past surgical history and problem list     Review of Systems   Constitutional: Negative  Eyes: Negative  Respiratory: Negative  Cardiovascular: Negative  Gastrointestinal: Negative  Genitourinary: Negative  Musculoskeletal: Negative for back pain, gait problem, joint swelling and myalgias  Neurological: Negative  Objective:  /90 (BP Location: Right arm, Patient Position: Sitting, Cuff Size: Adult)   Pulse 101   Ht 5' 6 5" (1 689 m)   Wt 105 kg (232 lb)   BMI 36 88 kg/m²       Right Shoulder Exam   Right shoulder exam is normal       Left Shoulder Exam     Range of Motion   Active abduction:  80 normal   Passive abduction:  110 normal   Extension: 40   External rotation: 40   Forward flexion: 130   Internal rotation 0 degrees: Lumbar   Internal rotation 90 degrees: normal     Muscle Strength   Abduction: 4/5   Internal rotation: 5/5   External rotation: 5/5   Supraspinatus: 4/5   Subscapularis: 4/5   Biceps: 5/5     Tests   Impingement: positive    Other   Erythema: absent  Sensation: normal  Pulse: present             Physical Exam  Constitutional:       Appearance: Normal appearance  Cardiovascular:      Pulses: Normal pulses  Pulmonary:      Effort: Pulmonary effort is normal    Abdominal:      Palpations: Abdomen is soft  Musculoskeletal:      Cervical back: Normal range of motion  Skin:     General: Skin is warm  Capillary Refill: Capillary refill takes less than 2 seconds  Neurological:      General: No focal deficit present  Mental Status: She is alert and oriented to person, place, and time  Psychiatric:         Mood and Affect: Mood normal          Behavior: Behavior normal        Large joint arthrocentesis: L subacromial bursa  [place] Protocol:  Procedure performed by: (Dr Darrell Harvey assisting )  Consent: Verbal consent obtained    Consent given by: patient  Patient understanding: patient states understanding of the procedure being performed  Patient identity confirmed: verbally with patient    Supporting Documentation  Indications: pain   Procedure Details  Location: shoulder - L subacromial bursa  Preparation: Patient was prepped and draped in the usual sterile fashion  Needle size: 22 G  Ultrasound guidance: no  Approach: lateral  Medications administered: 2 mL bupivacaine 0 25 %; 6 mg betamethasone acetate-betamethasone sodium phosphate 6 (3-3) mg/mL    Patient tolerance: patient tolerated the procedure well with no immediate complications  Dressing:  Sterile dressing applied            I have personally reviewed pertinent films in PACS and my interpretation is as follows      No new images were performed for today's visit

## 2022-09-20 ENCOUNTER — TELEPHONE (OUTPATIENT)
Dept: FAMILY MEDICINE CLINIC | Facility: CLINIC | Age: 42
End: 2022-09-20

## 2022-09-20 NOTE — TELEPHONE ENCOUNTER
Folder Dr Elijah Patterson     Name of Form - [employer]ability form    Color folder Natalya Uribe    Form to be addressed at appt on 9/27/22 with Dr Corie Gottron    Patient was made aware of the 7 business day form policy

## 2022-09-23 ENCOUNTER — OFFICE VISIT (OUTPATIENT)
Dept: PHYSICAL THERAPY | Age: 42
End: 2022-09-23
Payer: COMMERCIAL

## 2022-09-23 DIAGNOSIS — S46.011D TRAUMATIC INCOMPLETE TEAR OF RIGHT ROTATOR CUFF, SUBSEQUENT ENCOUNTER: Primary | ICD-10-CM

## 2022-09-23 PROCEDURE — 97110 THERAPEUTIC EXERCISES: CPT

## 2022-09-23 PROCEDURE — 97140 MANUAL THERAPY 1/> REGIONS: CPT

## 2022-09-23 NOTE — PROGRESS NOTES
Daily Note     Today's date: 2022  Patient name: Blane Reeves  : 1980  MRN: [id number]  Referring provider: Chasity Jenkins MD  Dx:   Encounter Diagnosis     ICD-10-CM    1  Traumatic incomplete tear of right rotator cuff, subsequent encounter  S46 011D                   Subjective: pt reports feeling a little better, pt reports HEP compliance, she states she wasn't able to attend therapy sessions past 2 weeks due to scheduling conflicts,pt reports SCI on Monday has helped to alleviate some of her sx      Objective: See treatment diary below      Assessment:  Min increased nico for PROM L shoulder, guarding noted throughout ROM, progressed ex as nico      Plan: Continue per plan of care  Progress treatment as tolerated  Precautions: none       Manuals 22           L shoulder ROM gentle  VK VK                                                  Neuro Re-Ed             S/L flexion NP            S/L abduction NP            S/L ER NP            Prone I,Y,T NP            rows NP            Gentle/ sub-max isometrics flex/add 10x5" ea 10x10" including er/ir                        Ther Ex             Pulleys  2' 5'           Finger ladder 2way flex Flex 10x3"           Railing slides 2way PAIN!             Table slides flex/scap 10x10" ea 10x10" ea           Sitting B ER AROM  2x10                                                  Ther Activity                                       Gait Training                                       Modalities 22            Ice post ex 10' 10'

## 2022-09-27 ENCOUNTER — OFFICE VISIT (OUTPATIENT)
Dept: FAMILY MEDICINE CLINIC | Facility: CLINIC | Age: 42
End: 2022-09-27

## 2022-09-27 VITALS
HEART RATE: 92 BPM | SYSTOLIC BLOOD PRESSURE: 132 MMHG | WEIGHT: 233 LBS | DIASTOLIC BLOOD PRESSURE: 84 MMHG | BODY MASS INDEX: 36.57 KG/M2 | OXYGEN SATURATION: 100 % | RESPIRATION RATE: 19 BRPM | HEIGHT: 67 IN | TEMPERATURE: 98 F

## 2022-09-27 DIAGNOSIS — S46.012D TRAUMATIC INCOMPLETE TEAR OF LEFT ROTATOR CUFF, SUBSEQUENT ENCOUNTER: Primary | ICD-10-CM

## 2022-09-27 PROCEDURE — 99213 OFFICE O/P EST LOW 20 MIN: CPT | Performed by: FAMILY MEDICINE

## 2022-09-27 NOTE — TELEPHONE ENCOUNTER
Form unable to be completed today due to patient state there is another page that she need to drop off  Form placed back to [facility] folder

## 2022-09-27 NOTE — ASSESSMENT & PLAN NOTE
-Improving  - Patient with  Left partial-thickness articular sided supraspinatus tendon tear  She follows closely with orthopedic (Dr Reena Marley) and has weekly PT sessions    - Requests FMLA and disablility paper work to be completed  Awaiting additional paper work from patient  - Advised to follow-up with orthopedic and continue with PT sessions    - She can return to work but with light duties and restrictions due to injury of left shoulder

## 2022-09-27 NOTE — PROGRESS NOTES
Name: Bettina Flores      : 1980      MRN: [id number]  Encounter Provider: Leonardo Cook MD  Encounter Date: 2022   Encounter department: [address] [place] [address]  Traumatic incomplete tear of left rotator cuff, subsequent encounter  Assessment & Plan:  -Improving  - Patient with  Left partial-thickness articular sided supraspinatus tendon tear  She follows closely with orthopedic (Dr Gallito Barrett) and has weekly PT sessions    - Requests FMLA and disablility paper work to be completed  Awaiting additional paper work from patient  - Advised to follow-up with orthopedic and continue with PT sessions    - She can return to work but with light duties and restrictions due to injury of left shoulder  Subjective      Patient present to the clinic for completion of FMLA and disability paper work due to injury to her shoulder  Patient has an incomplete tear of left rotator cuff and has been following ortho and PT  She has decided to manage injury non-surgically  ROM is limited by pain, but otherwise strength and sensation is intact  Review of Systems   Constitutional: Negative for activity change and appetite change  Respiratory: Negative for cough and shortness of breath  Cardiovascular: Negative for chest pain and palpitations  Gastrointestinal: Negative for abdominal pain and vomiting  Genitourinary: Negative for dysuria and hematuria  Musculoskeletal: Positive for arthralgias  Negative for back pain and joint swelling  Skin: Negative for color change and rash  All other systems reviewed and are negative        Current Outpatient Medications on File Prior to Visit   Medication Sig    cyclobenzaprine (FLEXERIL) 5 mg tablet Take 1 tablet (5 mg total) by mouth daily at bedtime    ferrous sulfate 324 MG TBEC Take 1 tablet (324 mg total) by mouth 2 (two) times a day    naproxen (Naprosyn) 500 mg tablet Take 1 tablet (500 mg total) by mouth 2 (two) times a day with meals    omeprazole (PriLOSEC) 20 mg delayed release capsule Take 1 capsule (20 mg total) by mouth daily before breakfast    ondansetron (Zofran ODT) 4 mg disintegrating tablet Take 1 tablet (4 mg total) by mouth every 6 (six) hours as needed for nausea or vomiting    rizatriptan (MAXALT-MLT) 10 MG disintegrating tablet Take 1 tablet (10 mg total) by mouth once as needed for migraine for up to 1 dose May repeat in 2 hours if needed       Objective     /84 (BP Location: Right arm, Patient Position: Sitting, Cuff Size: Large)   Pulse 92   Temp 98 °F (36 7 °C) (Temporal)   Resp 19   Ht 5' 6 5" (1 689 m)   Wt 106 kg (233 lb)   SpO2 100%   BMI 37 04 kg/m²     Physical Exam  Vitals reviewed  Constitutional:       General: She is not in acute distress  Appearance: Normal appearance  She is not ill-appearing, toxic-appearing or diaphoretic  HENT:      Head: Normocephalic and atraumatic  Eyes:      General:         Right eye: No discharge  Left eye: No discharge  Conjunctiva/sclera: Conjunctivae normal    Cardiovascular:      Rate and Rhythm: Normal rate and regular rhythm  Pulses: Normal pulses  Heart sounds: Normal heart sounds  Pulmonary:      Effort: Pulmonary effort is normal  No respiratory distress  Breath sounds: Normal breath sounds  No wheezing  Abdominal:      General: Abdomen is flat  Bowel sounds are normal  There is no distension  Palpations: Abdomen is soft  Tenderness: There is no abdominal tenderness  There is no guarding  Musculoskeletal:         General: Normal range of motion  Right lower leg: No edema  Left lower leg: No edema  Comments: ROM limited due to pain    + 5/5 strength b/l and sensation intact    Skin:     General: Skin is warm and dry  Neurological:      Mental Status: She is alert  Mental status is at baseline     Psychiatric:         Mood and Affect: Mood normal  Behavior: Behavior normal          Thought Content:  Thought content normal          Judgment: Judgment normal        Jonathan Sanabria MD

## 2022-09-30 ENCOUNTER — OFFICE VISIT (OUTPATIENT)
Dept: PHYSICAL THERAPY | Age: 42
End: 2022-09-30
Payer: COMMERCIAL

## 2022-09-30 DIAGNOSIS — S46.012D TRAUMATIC INCOMPLETE TEAR OF LEFT ROTATOR CUFF, SUBSEQUENT ENCOUNTER: Primary | ICD-10-CM

## 2022-09-30 PROCEDURE — 97112 NEUROMUSCULAR REEDUCATION: CPT | Performed by: PHYSICAL THERAPIST

## 2022-09-30 PROCEDURE — 97140 MANUAL THERAPY 1/> REGIONS: CPT | Performed by: PHYSICAL THERAPIST

## 2022-09-30 PROCEDURE — 97110 THERAPEUTIC EXERCISES: CPT | Performed by: PHYSICAL THERAPIST

## 2022-09-30 NOTE — PROGRESS NOTES
Daily Note     Today's date: 2022  Patient name: Irma Garcia  : 1980  MRN: [id number]  Referring provider: Deanna Pina MD  Dx:   Encounter Diagnosis     ICD-10-CM    1  Traumatic incomplete tear of left rotator cuff, subsequent encounter  S46 012D                   Subjective: Reports feeling good through physical therapy, but still has pain with certain motions  Objective: See treatment diary below      Assessment: Tolerated treatment well  Patient would benefit from continued PT, did well with exercises with minimal complaints during today's tx  Plan: Continue per plan of care  Precautions: none       Manuals 22           L shoulder ROM gentle  VK VK CW                                                  Neuro Re-Ed             S/L flexion NP            S/L abduction NP            S/L ER NP            Prone I,Y,T NP            rows NP            Gentle/ sub-max isometrics flex/add 10x5" ea 10x10" including er/ir 10x10" ea                        Ther Ex             Pulleys  2' 5' 5'           Finger ladder 2way flex Flex 10x3" Flex 15x3"          Railing slides 2way PAIN!             Table slides flex/scap 10x10" ea 10x10" ea 15x10" ea           Sitting B ER AROM  2x10 20x3"                                                  Ther Activity                                       Gait Training                                       Modalities 22            Ice post ex 10' 10' 10'

## 2022-10-06 ENCOUNTER — OFFICE VISIT (OUTPATIENT)
Dept: PHYSICAL THERAPY | Age: 42
End: 2022-10-06
Payer: COMMERCIAL

## 2022-10-06 DIAGNOSIS — S46.012D TRAUMATIC INCOMPLETE TEAR OF LEFT ROTATOR CUFF, SUBSEQUENT ENCOUNTER: Primary | ICD-10-CM

## 2022-10-06 DIAGNOSIS — S46.011D TRAUMATIC INCOMPLETE TEAR OF RIGHT ROTATOR CUFF, SUBSEQUENT ENCOUNTER: ICD-10-CM

## 2022-10-06 PROCEDURE — 97110 THERAPEUTIC EXERCISES: CPT

## 2022-10-06 PROCEDURE — 97112 NEUROMUSCULAR REEDUCATION: CPT

## 2022-10-06 PROCEDURE — 97140 MANUAL THERAPY 1/> REGIONS: CPT

## 2022-10-06 NOTE — PROGRESS NOTES
Daily Note     Today's date: 10/6/2022  Patient name: Darnell Oscar  : 1980  MRN: [id number]  Referring provider: Graciela Polk MD  Dx:   Encounter Diagnosis     ICD-10-CM    1  Traumatic incomplete tear of left rotator cuff, subsequent encounter  S46 012D    2  Traumatic incomplete tear of right rotator cuff, subsequent encounter  S46 011D                   Subjective: pt reports L shoulder better overall but feels about the same for the past week, cont to have difficulty reaching up above shoulder    Objective: See treatment diary below      Assessment: pt able to nico increased strengthening ex today, L shoulder PROM gradually improving, guarding noted during passive stretching L shoulder flex/abd       Plan: Continue per plan of care  Progress treatment as tolerated  Precautions: none       Manuals 9/2/22 9/23/22 9/30  10/6/22         L shoulder ROM gentle  VK VK CW  VK                                                Neuro Re-Ed             S/L flexion NP   2x10         S/L abduction NP   2x10         S/L ER NP   2x10         Prone I,Y,T NP            rows NP            Gentle/ sub-max isometrics flex/add 10x5" ea 10x10" including er/ir 10x10" ea                        Ther Ex             Pulleys  2' 5' 5'  5'         Finger ladder 2way flex Flex 10x3" Flex 15x3" Flex 15x3"         Railing slides 2way PAIN!             Table slides flex/scap 10x10" ea 10x10" ea 15x10" ea  15x10" ea         Sitting B ER AROM  2x10 20x3"           scap retracts    20x5"                                   Ther Activity                                       Gait Training                                       Modalities 22            Ice post ex 10' 10' 10'

## 2022-10-13 ENCOUNTER — APPOINTMENT (OUTPATIENT)
Dept: PHYSICAL THERAPY | Age: 42
End: 2022-10-13

## 2022-10-13 NOTE — TELEPHONE ENCOUNTER
Patient came in requesting an update on her [address] disability form, but form was missing 2 pages  Patient took form back and will bring new form with all pages back to office as soon as possible

## 2022-10-19 ENCOUNTER — TELEPHONE (OUTPATIENT)
Dept: FAMILY MEDICINE CLINIC | Facility: CLINIC | Age: 42
End: 2022-10-19

## 2022-10-19 NOTE — TELEPHONE ENCOUNTER
Pt had son drop off old form to copy onto new form says needs to be updated  Can call pt with any questions  Folder (To be completed by) - Dr Bassam Wilhelm     Name of Form - [facility] disability form    Color folder -[employer]    Form to be Faxed (Fax #), Mailed (Address), or Picked up (By whom) -    Patient was made aware of the 7-10 business day form policy      Call patient to let her know when complete

## 2022-10-20 ENCOUNTER — APPOINTMENT (OUTPATIENT)
Dept: PHYSICAL THERAPY | Age: 42
End: 2022-10-20

## 2023-03-20 ENCOUNTER — OFFICE VISIT (OUTPATIENT)
Dept: FAMILY MEDICINE CLINIC | Facility: CLINIC | Age: 43
End: 2023-03-20

## 2023-03-20 VITALS
RESPIRATION RATE: 18 BRPM | HEART RATE: 72 BPM | OXYGEN SATURATION: 98 % | SYSTOLIC BLOOD PRESSURE: 154 MMHG | TEMPERATURE: 97.1 F | DIASTOLIC BLOOD PRESSURE: 98 MMHG | HEIGHT: 67 IN | BODY MASS INDEX: 36.63 KG/M2 | WEIGHT: 233.4 LBS

## 2023-03-20 DIAGNOSIS — S46.012D TRAUMATIC INCOMPLETE TEAR OF LEFT ROTATOR CUFF, SUBSEQUENT ENCOUNTER: Primary | ICD-10-CM

## 2023-03-20 NOTE — PROGRESS NOTES
Name: Elizabeth Horvath      : 1980      MRN: [id number]  Encounter Provider: Sandy Zarate MD  Encounter Date: 3/20/2023   Encounter department: [address] [place] [address]  Traumatic incomplete tear of left rotator cuff, subsequent encounter  Assessment & Plan:  S/P post incomplete tear left rotator cuff in 2022  Patient reported pain initially was improving, however since last month she is experiencing worsening of the left anterior shoulder pain  FMLA and disability paperwork was completed by our office last year advising no weight lifting  Patient requesting lifting of restrictions on weight limit  Currently working 24 hours a week, for her to regain her insurance and work 40 hrs a week, patient should be able to lift 30 to 40 pounds at work  Patient currently symptomatic with worsening left anterior shoulder pain, which can possibly worsen with lifting up weight limit restrictions  Patient was diagnosed and was being followed up by orthopedic surgery during the acute episode last year  Advised patient to follow-up with sports medicine/Ortho for further evaluation of the worsening left anterior shoulder pain at the same site as prior partial tear and recommendations regarding work restrictions based on assesment  (As patient is not able to get an MRI due to lack of insurance for reassessment of the tear)      Orders:  -     Ambulatory Referral to Sports Medicine; Future         Subjective      23-year-old female presents today questing adjustments on the FMLA paperwork regarding weight limit  Patient was diagnosed with incomplete tear of left anterior shoulder in 2022  FMLA paperwork was completed with weight limit restrictions  Patient is currently working 24 hours a week and thus is not able to get her insurance via work  Patient reports for her to be able to get insurance she should be able to lift 30 to 40 pounds at work  Patient also reports she has pain in the left anterior shoulder which is worsening over a month  No known trauma or injury  No other acute concerns    Review of Systems   Constitutional: Negative for chills and fever  HENT: Negative for congestion  Gastrointestinal: Negative  Musculoskeletal: Positive for arthralgias  Negative for back pain  All other systems reviewed and are negative  Current Outpatient Medications on File Prior to Visit   Medication Sig   • cyclobenzaprine (FLEXERIL) 5 mg tablet Take 1 tablet (5 mg total) by mouth daily at bedtime   • ferrous sulfate 324 MG TBEC Take 1 tablet (324 mg total) by mouth 2 (two) times a day   • naproxen (Naprosyn) 500 mg tablet Take 1 tablet (500 mg total) by mouth 2 (two) times a day with meals   • omeprazole (PriLOSEC) 20 mg delayed release capsule Take 1 capsule (20 mg total) by mouth daily before breakfast   • ondansetron (Zofran ODT) 4 mg disintegrating tablet Take 1 tablet (4 mg total) by mouth every 6 (six) hours as needed for nausea or vomiting   • rizatriptan (MAXALT-MLT) 10 MG disintegrating tablet Take 1 tablet (10 mg total) by mouth once as needed for migraine for up to 1 dose May repeat in 2 hours if needed       Objective     /98 (BP Location: Left arm, Patient Position: Sitting, Cuff Size: Large)   Pulse 72   Temp (!) 97 1 °F (36 2 °C) (Temporal)   Resp 18   Ht 5' 6 5" (1 689 m)   Wt 106 kg (233 lb 6 4 oz)   LMP  (LMP Unknown)   SpO2 98%   BMI 37 11 kg/m²     Physical Exam  Vitals reviewed  Constitutional:       General: She is not in acute distress  Appearance: Normal appearance  HENT:      Head: Normocephalic and atraumatic  Eyes:      Conjunctiva/sclera: Conjunctivae normal    Cardiovascular:      Rate and Rhythm: Normal rate  Pulmonary:      Effort: Pulmonary effort is normal  No respiratory distress  Musculoskeletal:         General: Tenderness present  No deformity or signs of injury        Right lower leg: No edema  Left lower leg: No edema  Comments: ROM limited left shoulder in the setting of pain  Left anterior shoulder   Skin:     General: Skin is warm and dry  Neurological:      Mental Status: She is alert and oriented to person, place, and time        Gait: Gait normal    Psychiatric:         Mood and Affect: Mood normal          Behavior: Behavior normal        Purvi Landrum MD

## 2023-03-20 NOTE — ASSESSMENT & PLAN NOTE
S/P post incomplete tear left rotator cuff in August 2022  Patient reported pain initially was improving, however since last month she is experiencing worsening of the left anterior shoulder pain  FMLA and disability paperwork was completed by our office last year advising no weight lifting  Patient requesting lifting of restrictions on weight limit  Currently working 24 hours a week, for her to regain her insurance and work 40 hrs a week, patient should be able to lift 30 to 40 pounds at work  Patient currently symptomatic with worsening left anterior shoulder pain, which can possibly worsen with lifting up weight limit restrictions  Patient was diagnosed and was being followed up by orthopedic surgery during the acute episode last year  Advised patient to follow-up with sports medicine/Ortho for further evaluation of the worsening left anterior shoulder pain at the same site as prior partial tear and recommendations regarding work restrictions based on assesment  (As patient is not able to get an MRI due to lack of insurance for reassessment of the tear)

## 2023-03-29 ENCOUNTER — OFFICE VISIT (OUTPATIENT)
Dept: OBGYN CLINIC | Facility: MEDICAL CENTER | Age: 43
End: 2023-03-29

## 2023-03-29 ENCOUNTER — TELEPHONE (OUTPATIENT)
Dept: FAMILY MEDICINE CLINIC | Facility: CLINIC | Age: 43
End: 2023-03-29

## 2023-03-29 VITALS
HEIGHT: 67 IN | WEIGHT: 231 LBS | HEART RATE: 81 BPM | SYSTOLIC BLOOD PRESSURE: 135 MMHG | BODY MASS INDEX: 36.26 KG/M2 | DIASTOLIC BLOOD PRESSURE: 88 MMHG

## 2023-03-29 DIAGNOSIS — S46.012D TRAUMATIC INCOMPLETE TEAR OF LEFT ROTATOR CUFF, SUBSEQUENT ENCOUNTER: Primary | ICD-10-CM

## 2023-03-29 RX ORDER — NAPROXEN 500 MG/1
500 TABLET ORAL 2 TIMES DAILY WITH MEALS
Qty: 30 TABLET | Refills: 0 | Status: SHIPPED | OUTPATIENT
Start: 2023-03-29

## 2023-03-29 NOTE — LETTER
March 29, 2023     Patient: Shannon Werner  YOB: 1980  Date of Visit: 3/29/2023      To Whom it May Concern:    Shannon Werner is under my professional care  John Wagner was seen in my office on 3/29/2023  John Wagner may return to work on 3/30/2023 without restrictions   If you have any questions or concerns, please don't hesitate to call           Sincerely,          Winifred Flower DO        CC: No Recipients

## 2023-03-29 NOTE — LETTER
March 29, 2023      Patient: Chela Ramirez  YOB: 1980  Date of Visit: 3/20/2023      To Whom it May Concern:    Chela Ramirez is under my professional care  [place] Ram was seen in my office on 3/20/2023  Eric Pennington may return to work with no limitations or restrictions  If you have any questions or concerns, please don't hesitate to call           Sincerely,          Betina Garcia        CC: No Recipients

## 2023-03-29 NOTE — LETTER
March 29, 2023     Patient: Ema Lee  YOB: 1980  Date of Visit: 3/20/2023      To Whom it May Concern:    Ema Lee is under my professional care  Toña Rogers was seen in my office on 3/20/2023  Toña Rogers may return to work with no limitation or restrictions  If you have any questions or concerns, please don't hesitate to call           Sincerely,          Andressa Villagomez        CC: No Recipients

## 2023-03-29 NOTE — TELEPHONE ENCOUNTER
Reviewed Ortho note, RTW clearance was not documented  Reached out to, Ortho, Dr Tonia Ross via [employer] text, who stated is cleared for full return  Letter sent via [employer] stating the same

## 2023-03-29 NOTE — PROGRESS NOTES
Ortho Sports Medicine Shoulder New Patient Visit     Assesment:   36 y o  female left shoulder partial-thickness tear of the supraspinatus     Plan:    Conservative treatment:    Ice to shoulder 1-2 times daily, for 20 minutes at a time  PT for ROM and strengthening to shoulder, rotator cuff, scapular stabilizers  Home exercise program for shoulder, including ROM and strenthening  Instructions given to patient of what exercises to perform  Imaging: All imaging from today was reviewed by myself and explained to the patient  Injection:    No Injection planned at this time as the patient declined today      Surgery:     No surgery is recommended at this point, continue with conservative treatment plan as noted  Follow up:    No follow-ups on file  Chief Complaint   Patient presents with   • Left Shoulder - Pain       History of Present Illness: The patient is a 36 y o , 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, 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 by themself, seen in clinic for evaluation of left shoulder pain  The patient denies a history of diabetes  The patient denies a history of thyroid disorder  Pain is located anterior, posterior, deep  The patient rates the pain as a 6/10  The pain has been present for a few months  The patient sustained an injury roughly in June of last year while lifting a laundry basket causing immediate pain of her left shoulder  The mechanism of injury was lifting/reaching  The pain is characterized as sharp  The pain is present at all times  She was seen by Dr Ade Hensley in June for this issue and workup revealed a left rotator cuff tear  She underwent physical therapy and a subacromial corticosteroid injection which provided mild relief  She was unable to continue with physical therapy due to a loss of insurance  She is hoping to be taken off her primary care provider's restrictions at work so she may obtain insurance again   She denies numbness or tingling to the left hand  She denies new injury  Pain is improved somewhat by rest,physical therapy, and corticosteroid injections  Pain is aggravated by overhead activity, reaching back, rotation, lifting  and exercising  The patient denies weakness  The patient denies numbness and tingling  The patient has tried rest, ice, physical therapy and injection  Shoulder Surgical History:  None    Past Medical, Social and Family History:  Past Medical History:   Diagnosis Date   • Abnormal Pap smear of cervix    • Fibroid    • Heart murmur    • Iron deficiency anemia     due to heavy menstrual bleeding  needed iron infusions   • Migraine      Past Surgical History:   Procedure Laterality Date   •  SECTION     • CHOLECYSTECTOMY     • COLPOSCOPY       No Known Allergies  Current Outpatient Medications on File Prior to Visit   Medication Sig Dispense Refill   • cyclobenzaprine (FLEXERIL) 5 mg tablet Take 1 tablet (5 mg total) by mouth daily at bedtime 20 tablet 0   • ferrous sulfate 324 MG TBEC Take 1 tablet (324 mg total) by mouth 2 (two) times a day 60 tablet 3   • naproxen (Naprosyn) 500 mg tablet Take 1 tablet (500 mg total) by mouth 2 (two) times a day with meals 60 tablet 0   • omeprazole (PriLOSEC) 20 mg delayed release capsule Take 1 capsule (20 mg total) by mouth daily before breakfast 30 capsule 0   • ondansetron (Zofran ODT) 4 mg disintegrating tablet Take 1 tablet (4 mg total) by mouth every 6 (six) hours as needed for nausea or vomiting 20 tablet 0   • rizatriptan (MAXALT-MLT) 10 MG disintegrating tablet Take 1 tablet (10 mg total) by mouth once as needed for migraine for up to 1 dose May repeat in 2 hours if needed 9 tablet 3     No current facility-administered medications on file prior to visit       Social History     Socioeconomic History   • Marital status: Single     Spouse name: Not on file   • Number of children: Not on file   • Years of education: Not on file   • Highest education level: Not on file   Occupational History   • Not on file   Tobacco Use   • Smoking status: Never   • Smokeless tobacco: Never   Vaping Use   • Vaping Use: Never used   Substance and Sexual Activity   • Alcohol use: Yes     Comment: rare   • Drug use: Never   • Sexual activity: Yes     Partners: Male     Birth control/protection: Condom Male, None   Other Topics Concern   • Not on file   Social History Narrative   • Not on file     Social Determinants of Health     Financial Resource Strain: Low Risk    • Difficulty of Paying Living Expenses: Not hard at all   Food Insecurity: No Food Insecurity   • Worried About Running Out of Food in the Last Year: Never true   • Ran Out of Food in the Last Year: Never true   Transportation Needs: No Transportation Needs   • Lack of Transportation (Medical): No   • Lack of Transportation (Non-Medical): No   Physical Activity: Not on file   Stress: Not on file   Social Connections: Not on file   Intimate Partner Violence: Not At Risk   • Fear of Current or Ex-Partner: No   • Emotionally Abused: No   • Physically Abused: No   • Sexually Abused: No   Housing Stability: Low Risk    • Unable to Pay for Housing in the Last Year: No   • Number of Places Lived in the Last Year: 1   • Unstable Housing in the Last Year: No         I have reviewed the past medical, surgical, social and family history, medications and allergies as documented in the EMR  Review of systems: ROS is negative other than that noted in the HPI  Constitutional: Negative for fatigue and fever  HENT: Negative for sore throat  Respiratory: Negative for shortness of breath  Cardiovascular: Negative for chest pain  Gastrointestinal: Negative for abdominal pain  Endocrine: Negative for cold intolerance and heat intolerance  Genitourinary: Negative for flank pain  Musculoskeletal: Negative for back pain  Skin: Negative for rash     Allergic/Immunologic: Negative for immunocompromised "state  Neurological: Negative for dizziness  Psychiatric/Behavioral: Negative for agitation  Physical Exam:    Height 5' 6 5\" (1 689 m), not currently breastfeeding  General/Constitutional: NAD, well developed, well nourished  HENT: Normocephalic, atraumatic  CV: Intact distal pulses, regular rate  Resp: No respiratory distress or labored breathing  GI: Soft and non-tender   Lymphatic: No lymphadenopathy palpated  Neuro: Alert and Oriented x 3, no focal deficits  Psych: Normal mood, normal affect, normal judgement, normal behavior  Skin: Warm, dry, no rashes, no erythema      Shoulder focused exam:       RIGHT LEFT    Scapula Atrophy Negative Negative     Winging Negative Negative     Protraction Negative Negative    Rotator cuff SS 5/5 5/5     IS 5/5 5/5     SubS 5/5 5/5    ROM     170     ER0 60 60     ER90 90    90     IR90 T6    T6     IRb L3    T6    TTP: AC Positive Negative     Biceps Negative Negative     Coracoid Positive Negative    Special Tests: O'Briens Negative Negative     Coleman-shear Negative Negative     Cross body Adduction Positive Negative     Speeds  Negative Negative     Naheed's Positive Negative     Whipple Not Applicable Negative       Neer Positive Not Applicable     Aranda Positive Not Applicable    Instability: Apprehension & relocation not tested not tested     Load & shift not tested not tested    Other: Crank Negative Negative                 UE NV Exam: +2 Radial pulses bilaterally  Sensation intact to light touch C5-T1 bilaterally, Radial/median/ulnar nerve motor intact      Bilateral elbow, wrist, and and forearm ROM full, painless with passive ROM, no ttp or crepitance throughout extremities below shoulder joint    Cervical ROM is full without pain, numbness or tingling      Shoulder Imaging    X-rays of the left shoulder were reviewed, which demonstrate no acute fracture or dislocation  There is no calcific lesions seen within the subacromial space   The " acromiohumeral space is within normal limits  I have reviewed the radiology report and agree with their impression  MRI of the left shoulder were reviewed, which demonstrates an articular sided partial thickness tear of the supraspinatus tendon at the insertion site  The coracoid-humeral distance is 6 8mm  I have reviewed the radiology report and agree with their impression        Scribe Attestation    I,:   am acting as a scribe while in the presence of the attending physician :       I,:   personally performed the services described in this documentation    as scribed in my presence :

## 2023-03-29 NOTE — TELEPHONE ENCOUNTER
Patient needed to be seen to by ortho to be clear with no limitation for work  Patient states ortho stated patient does not need limitations for work  Patient is needs a letter form Dr Jt Jain stating she can work with no limitations  Patient needs letter as soon as possible

## 2023-05-15 ENCOUNTER — OFFICE VISIT (OUTPATIENT)
Dept: FAMILY MEDICINE CLINIC | Facility: CLINIC | Age: 43
End: 2023-05-15

## 2023-05-15 VITALS
HEART RATE: 97 BPM | DIASTOLIC BLOOD PRESSURE: 84 MMHG | BODY MASS INDEX: 36.6 KG/M2 | WEIGHT: 230.2 LBS | RESPIRATION RATE: 18 BRPM | OXYGEN SATURATION: 99 % | SYSTOLIC BLOOD PRESSURE: 127 MMHG | TEMPERATURE: 98.1 F

## 2023-05-15 DIAGNOSIS — B30.9 ACUTE VIRAL CONJUNCTIVITIS OF LEFT EYE: Primary | ICD-10-CM

## 2023-05-15 NOTE — LETTER
May 15, 2023     Patient: Arnoldo Hernandez  YOB: 1980  Date of Visit: 5/15/2023      To Whom it May Concern:    Arnoldo Hernandez is under my professional care  Kendal Felty was seen in my office on 5/15/2023  Kendal Felty may return to work on 5/17/23  If you have any questions or concerns, please don't hesitate to call           Sincerely,          Gia Husain DO        CC: No Recipients

## 2023-05-15 NOTE — PROGRESS NOTES
Name: Darci Toney      : 1980      MRN: [id number]  Encounter Provider: Saúl Stanley DO  Encounter Date: 5/15/2023   Encounter department: [address] [place] [address]  Acute viral conjunctivitis of left eye  Assessment & Plan:  Pt onset of symptoms was today morning  Sick contact includes pt's grandson has viral pink eye, not on antibiotics  Mildly injected left eye with white crusting  A/w diarrhea  Most likely viral conjunctivitis  Plan  - supportive measures such as cold water compress  - pt declined ophthalmic antihistamine  - hand washing stressed  - Work note given to return on Wednesday    - RTO if symptoms worsen or do not improve after 1 week  Depression Screening and Follow-up Plan: Patient was screened for depression during today's encounter  They screened negative with a PHQ-2 score of 0  Subjective      35 y/o pt presents to the office with left eye erythema starting today  Grandson of patient has pink eye, got it last Friday  Left eye appears more swollen than right  Pt describes yellow crusting of eye with yellow discharge, itching and tearing  Pt has not taken anything for the eye  Pt endorses diarrhea, described as brown  She also confirms bluriness and photophobia, but does have concurrent migraine episode  Pt denies pain, ear pain, sore throat, runny nose or congestion, neck pain, dizziness, stomach pain  Pt denies history of allergies  Conjunctivitis   Associated symptoms include eye itching, diarrhea, headaches, eye discharge and eye redness  Pertinent negatives include no fever, no nausea, no congestion, no ear discharge, no ear pain, no rhinorrhea and no wheezing  Review of Systems   Constitutional: Negative for fatigue and fever  HENT: Negative for congestion, ear discharge, ear pain, rhinorrhea and sinus pain  Eyes: Positive for discharge, redness and itching     Respiratory: Negative for shortness of breath and wheezing  Cardiovascular: Negative  Gastrointestinal: Positive for diarrhea  Negative for nausea  Endocrine: Negative  Genitourinary: Negative  Musculoskeletal: Negative  Skin: Negative  Allergic/Immunologic: Negative  Neurological: Positive for headaches  Hematological: Negative  Psychiatric/Behavioral: Negative  Current Outpatient Medications on File Prior to Visit   Medication Sig   • ferrous sulfate 324 MG TBEC Take 1 tablet (324 mg total) by mouth 2 (two) times a day   • naproxen (EC NAPROSYN) 500 MG EC tablet Take 1 tablet (500 mg total) by mouth 2 (two) times a day with meals   • naproxen (Naprosyn) 500 mg tablet Take 1 tablet (500 mg total) by mouth 2 (two) times a day with meals   • omeprazole (PriLOSEC) 20 mg delayed release capsule Take 1 capsule (20 mg total) by mouth daily before breakfast   • rizatriptan (MAXALT-MLT) 10 MG disintegrating tablet Take 1 tablet (10 mg total) by mouth once as needed for migraine for up to 1 dose May repeat in 2 hours if needed   • cyclobenzaprine (FLEXERIL) 5 mg tablet Take 1 tablet (5 mg total) by mouth daily at bedtime (Patient not taking: Reported on 3/29/2023)   • ondansetron (Zofran ODT) 4 mg disintegrating tablet Take 1 tablet (4 mg total) by mouth every 6 (six) hours as needed for nausea or vomiting (Patient not taking: Reported on 3/29/2023)       Objective     /84   Pulse 97   Temp 98 1 °F (36 7 °C)   Resp 18   Wt 104 kg (230 lb 3 2 oz)   SpO2 99%   BMI 36 60 kg/m²     Physical Exam  Constitutional:       Appearance: Normal appearance  She is obese  Eyes:      General: Lids are normal  Vision grossly intact  Gaze aligned appropriately  Left eye: Discharge present  Conjunctiva/sclera:      Left eye: Left conjunctiva is injected (mildy)  Exudate present  Pupils: Pupils are equal, round, and reactive to light     Cardiovascular:      Rate and Rhythm: Normal rate and regular rhythm  Pulses: Normal pulses  Heart sounds: Normal heart sounds  Pulmonary:      Effort: Pulmonary effort is normal       Breath sounds: Normal breath sounds  Skin:     General: Skin is warm and dry  Neurological:      Mental Status: She is alert  Mental status is at baseline     Psychiatric:         Mood and Affect: Mood normal        Binu Blanco DO

## 2023-05-16 PROBLEM — B30.9 ACUTE VIRAL CONJUNCTIVITIS OF LEFT EYE: Status: ACTIVE | Noted: 2023-05-16

## 2023-05-19 ENCOUNTER — OFFICE VISIT (OUTPATIENT)
Dept: FAMILY MEDICINE CLINIC | Facility: CLINIC | Age: 43
End: 2023-05-19

## 2023-05-19 VITALS
BODY MASS INDEX: 36.73 KG/M2 | HEART RATE: 99 BPM | OXYGEN SATURATION: 100 % | DIASTOLIC BLOOD PRESSURE: 89 MMHG | SYSTOLIC BLOOD PRESSURE: 132 MMHG | HEIGHT: 67 IN | RESPIRATION RATE: 18 BRPM | WEIGHT: 234 LBS | TEMPERATURE: 98.2 F

## 2023-05-19 DIAGNOSIS — J06.9 UPPER RESPIRATORY INFECTION, VIRAL: Primary | ICD-10-CM

## 2023-05-19 NOTE — PROGRESS NOTES
Name: Charmaine Skinner      : 1980      MRN: [id number]  Encounter Provider: Darcy Shelton MD  Encounter Date: 2023   Encounter department: [address] [place] [address]  Upper respiratory infection, viral  Assessment & Plan:  Two days of sore throat and congestion  Pain with swallowing, but is tolerating fluids  No fevers, erythema or exudates on exam  No concern for strep throat at this time  Plan  - Discussed symptom management: cough drops, throat spray, tea with honey, alternate Tylenol with Ibuprofen prn    - If symptoms improve then worsen, or persist longer than 7-10 days, please return  Subjective      HPI   Charmaine Skinner is a 36year old female who presents with sore throat, cough, and congestion for one day  Her grandson is also sick with similar symptoms  She has used honey and hot tea for her sore throat, but no medication  She has pain with swallowing, but is able to drink fluids  She denies fevers, ear pain,or shortness of breath  Review of Systems   Constitutional: Negative for chills and fever  HENT: Positive for congestion, rhinorrhea, sore throat and trouble swallowing  Negative for ear pain, sinus pressure and sinus pain  Eyes: Negative for pain, discharge, redness and itching  Respiratory: Positive for cough  Negative for shortness of breath, wheezing and stridor  Cardiovascular: Negative for chest pain  Skin: Negative for rash  Neurological: Negative for headaches         Current Outpatient Medications on File Prior to Visit   Medication Sig   • cyclobenzaprine (FLEXERIL) 5 mg tablet Take 1 tablet (5 mg total) by mouth daily at bedtime   • ferrous sulfate 324 MG TBEC Take 1 tablet (324 mg total) by mouth 2 (two) times a day   • naproxen (EC NAPROSYN) 500 MG EC tablet Take 1 tablet (500 mg total) by mouth 2 (two) times a day with meals   • naproxen (Naprosyn) 500 mg tablet Take 1 tablet (500 "mg total) by mouth 2 (two) times a day with meals   • omeprazole (PriLOSEC) 20 mg delayed release capsule Take 1 capsule (20 mg total) by mouth daily before breakfast   • ondansetron (Zofran ODT) 4 mg disintegrating tablet Take 1 tablet (4 mg total) by mouth every 6 (six) hours as needed for nausea or vomiting   • rizatriptan (MAXALT-MLT) 10 MG disintegrating tablet Take 1 tablet (10 mg total) by mouth once as needed for migraine for up to 1 dose May repeat in 2 hours if needed       Objective     /89 (BP Location: Left arm, Patient Position: Sitting, Cuff Size: Large)   Pulse 99   Temp 98 2 °F (36 8 °C) (Temporal)   Resp 18   Ht 5' 6 5\" (1 689 m)   Wt 106 kg (234 lb)   SpO2 100%   BMI 37 20 kg/m²     Physical Exam  Constitutional:       Appearance: Normal appearance  HENT:      Head: Normocephalic and atraumatic  Nose: Nose normal       Mouth/Throat:      Mouth: Mucous membranes are moist       Pharynx: Oropharynx is clear  Uvula midline  No pharyngeal swelling, oropharyngeal exudate or posterior oropharyngeal erythema  Tonsils: No tonsillar exudate  Cardiovascular:      Rate and Rhythm: Normal rate and regular rhythm  Pulses: Normal pulses  Heart sounds: Normal heart sounds  Pulmonary:      Effort: Pulmonary effort is normal       Breath sounds: Normal breath sounds  Skin:     Capillary Refill: Capillary refill takes less than 2 seconds  Neurological:      General: No focal deficit present  Mental Status: She is alert and oriented to person, place, and time  Psychiatric:         Mood and Affect: Mood normal          Behavior: Behavior normal          Thought Content:  Thought content normal          Judgment: Judgment normal        Anh Finley MD  "

## 2023-05-19 NOTE — ASSESSMENT & PLAN NOTE
Two days of sore throat and congestion  Pain with swallowing, but is tolerating fluids  No fevers, erythema or exudates on exam  No concern for strep throat at this time  Plan  - Discussed symptom management: cough drops, throat spray, tea with honey, alternate Tylenol with Ibuprofen prn    - If symptoms improve then worsen, or persist longer than 7-10 days, please return

## 2023-06-15 ENCOUNTER — TELEPHONE (OUTPATIENT)
Dept: FAMILY MEDICINE CLINIC | Facility: CLINIC | Age: 43
End: 2023-06-15

## 2023-06-15 NOTE — TELEPHONE ENCOUNTER
Folder (To be completed by)   Dr Lenora Garcia     Name of [facility] 83 folder [facility])    Form to be Faxed (Fax #), [phone]    Provider Folder for signature    Patient was made aware of the 7-10 business day form policy

## 2023-07-07 ENCOUNTER — OFFICE VISIT (OUTPATIENT)
Dept: FAMILY MEDICINE CLINIC | Facility: CLINIC | Age: 43
End: 2023-07-07

## 2023-07-07 VITALS
DIASTOLIC BLOOD PRESSURE: 86 MMHG | HEART RATE: 86 BPM | SYSTOLIC BLOOD PRESSURE: 132 MMHG | BODY MASS INDEX: 36.66 KG/M2 | TEMPERATURE: 97.7 F | OXYGEN SATURATION: 100 % | WEIGHT: 230.6 LBS | RESPIRATION RATE: 18 BRPM

## 2023-07-07 DIAGNOSIS — R13.10 ODYNOPHAGIA: Primary | ICD-10-CM

## 2023-07-07 LAB
S PYO AG THROAT QL: NEGATIVE
SARS-COV-2 AG UPPER RESP QL IA: NEGATIVE
VALID CONTROL: NORMAL

## 2023-07-07 PROCEDURE — 87880 STREP A ASSAY W/OPTIC: CPT | Performed by: FAMILY MEDICINE

## 2023-07-07 PROCEDURE — 99213 OFFICE O/P EST LOW 20 MIN: CPT | Performed by: FAMILY MEDICINE

## 2023-07-07 PROCEDURE — 87811 SARS-COV-2 COVID19 W/OPTIC: CPT | Performed by: FAMILY MEDICINE

## 2023-07-07 RX ORDER — NAPROXEN 500 MG/1
500 TABLET ORAL 2 TIMES DAILY WITH MEALS
Qty: 30 TABLET | Refills: 0 | Status: SHIPPED | OUTPATIENT
Start: 2023-07-07

## 2023-07-07 RX ORDER — OMEPRAZOLE 20 MG/1
20 CAPSULE, DELAYED RELEASE ORAL
Qty: 30 CAPSULE | Refills: 0 | Status: SHIPPED | OUTPATIENT
Start: 2023-07-07

## 2023-07-07 NOTE — ASSESSMENT & PLAN NOTE
- C/o 4 days of consistent odynophagia with solids and liquids. Denies fevers, sore throat, coughing, sneezing, nausea or vomiting, changes in diet. Mild history of GERD, resolves with calcium carbonate, not new. - FmHx of mom with stomach cancer - diagnosed and passed away at age of 58.    - PE: Pain and tenderness on anterior L side of throat; no swollen lymph nodes palpated. No swelling or erythema noted in throat. Able to swallow, but still painful. No ear pain.    - Clinic labs: negative COVID, negative Strep   · Currently treat as viral: increase hydration, tea with marcy and honey, vitamins  · Naproxen prn for pain, omeprazole daily   · Ambulatory referral to GI for possible EGD/Barium swallow due to concern for family history  · F/U in 2 weeks

## 2023-07-07 NOTE — PROGRESS NOTES
Name: Kayleigh Macdonald      : 1980      MRN: [id number]  Encounter Provider: Mohit Tomas MD  Encounter Date: 2023   Encounter department: [address]     1. Odynophagia  Assessment & Plan:  - C/o 4 days of consistent odynophagia with solids and liquids. Denies fevers, sore throat, coughing, sneezing, nausea or vomiting, changes in diet. Mild history of GERD, resolves with calcium carbonate, not new. - FmHx of mom with stomach cancer - diagnosed and passed away at age of 58.    - PE: Pain and tenderness on anterior L side of throat; no swollen lymph nodes palpated. No swelling or erythema noted in throat. Able to swallow, but still painful. No ear pain. - Clinic labs: negative COVID, negative Strep   Currently treat as viral: increase hydration, tea with marcy and honey, vitamins  Naproxen prn for pain, omeprazole daily   Ambulatory referral to GI for possible EGD/Barium swallow due to concern for family history  F/U in 2 weeks     Orders:  -     Ambulatory Referral to Gastroenterology; Future  -     POCT rapid strepA  -     Poct Covid 19 Rapid Antigen Test  -     naproxen (EC NAPROSYN) 500 MG EC tablet; Take 1 tablet (500 mg total) by mouth 2 (two) times a day with meals  -     omeprazole (PriLOSEC) 20 mg delayed release capsule; Take 1 capsule (20 mg total) by mouth daily before breakfast           Subjective      HPI 37 yo female with mild hx of GERD with 4 days of progressively worsening odynophagia with solids and liquids. 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 Family history of stomach cancer in mom - diagnosed at age of 58; passed away 6 weeks later. Review of Systems   Constitutional: Negative for chills and fever. HENT: Positive for trouble swallowing.  Negative for ear pain, sinus pressure, sinus pain, sneezing, sore throat and voice change. Eyes: Negative for pain and visual disturbance. Respiratory: Negative for cough and shortness of breath. Cardiovascular: Negative for chest pain and palpitations. Gastrointestinal: Negative for abdominal pain and vomiting. Genitourinary: Negative for dysuria and hematuria. Musculoskeletal: Negative for arthralgias and back pain. Skin: Negative for color change and rash. Neurological: Negative for seizures and syncope. All other systems reviewed and are negative. Current Outpatient Medications on File Prior to Visit   Medication Sig   • ferrous sulfate 324 MG TBEC Take 1 tablet (324 mg total) by mouth 2 (two) times a day   • cyclobenzaprine (FLEXERIL) 5 mg tablet Take 1 tablet (5 mg total) by mouth daily at bedtime (Patient not taking: Reported on 7/7/2023)   • naproxen (Naprosyn) 500 mg tablet Take 1 tablet (500 mg total) by mouth 2 (two) times a day with meals (Patient not taking: Reported on 7/7/2023)   • ondansetron (Zofran ODT) 4 mg disintegrating tablet Take 1 tablet (4 mg total) by mouth every 6 (six) hours as needed for nausea or vomiting (Patient not taking: Reported on 7/7/2023)   • rizatriptan (MAXALT-MLT) 10 MG disintegrating tablet Take 1 tablet (10 mg total) by mouth once as needed for migraine for up to 1 dose May repeat in 2 hours if needed (Patient not taking: Reported on 7/7/2023)   • [DISCONTINUED] naproxen (EC NAPROSYN) 500 MG EC tablet Take 1 tablet (500 mg total) by mouth 2 (two) times a day with meals (Patient not taking: Reported on 7/7/2023)   • [DISCONTINUED] omeprazole (PriLOSEC) 20 mg delayed release capsule Take 1 capsule (20 mg total) by mouth daily before breakfast (Patient not taking: Reported on 7/7/2023)       Objective     /86   Pulse 86   Temp 97.7 °F (36.5 °C)   Resp 18   Wt 105 kg (230 lb 9.6 oz)   SpO2 100%   BMI 36.66 kg/m²     Physical Exam  Vitals reviewed. Constitutional:       General: She is awake.  She is not in acute distress. Appearance: Normal appearance. She is not ill-appearing. HENT:      Head: Normocephalic and atraumatic. Right Ear: External ear normal.      Left Ear: External ear normal.      Nose: Nose normal.      Mouth/Throat:      Lips: No lesions. Mouth: Mucous membranes are moist.      Tongue: Tongue does not deviate from midline. Pharynx: Oropharynx is clear. No pharyngeal swelling, oropharyngeal exudate, posterior oropharyngeal erythema or uvula swelling. Tonsils: No tonsillar exudate or tonsillar abscesses. Eyes:      Extraocular Movements: Extraocular movements intact. Cardiovascular:      Rate and Rhythm: Normal rate and regular rhythm. Pulses: Normal pulses. Heart sounds: Normal heart sounds. No murmur heard. Pulmonary:      Effort: Pulmonary effort is normal. No respiratory distress. Breath sounds: Normal breath sounds and air entry. No wheezing or rales. Abdominal:      General: Bowel sounds are normal. There is no distension. Palpations: Abdomen is soft. There is no mass. Tenderness: There is no abdominal tenderness. There is no guarding or rebound. Musculoskeletal:         General: No swelling, deformity or signs of injury. Normal range of motion. Cervical back: Normal range of motion and neck supple. Skin:     General: Skin is warm and dry. Capillary Refill: Capillary refill takes less than 2 seconds. Findings: No bruising or rash. Neurological:      General: No focal deficit present. Mental Status: She is alert and oriented to person, place, and time. Mental status is at baseline. Psychiatric:         Mood and Affect: Mood normal.         Behavior: Behavior normal. Behavior is cooperative. Thought Content:  Thought content normal.       Vinicius Brush MD

## 2023-07-15 PROBLEM — B30.9 ACUTE VIRAL CONJUNCTIVITIS OF LEFT EYE: Status: RESOLVED | Noted: 2023-05-16 | Resolved: 2023-07-15

## 2023-07-18 PROBLEM — J06.9 UPPER RESPIRATORY INFECTION, VIRAL: Status: RESOLVED | Noted: 2023-05-19 | Resolved: 2023-07-18

## 2023-08-10 ENCOUNTER — TELEPHONE (OUTPATIENT)
Dept: GASTROENTEROLOGY | Facility: CLINIC | Age: 43
End: 2023-08-10

## 2023-08-22 ENCOUNTER — OFFICE VISIT (OUTPATIENT)
Dept: GASTROENTEROLOGY | Facility: CLINIC | Age: 43
End: 2023-08-22
Payer: COMMERCIAL

## 2023-08-22 ENCOUNTER — TELEPHONE (OUTPATIENT)
Dept: GASTROENTEROLOGY | Facility: CLINIC | Age: 43
End: 2023-08-22

## 2023-08-22 VITALS
DIASTOLIC BLOOD PRESSURE: 92 MMHG | WEIGHT: 227 LBS | SYSTOLIC BLOOD PRESSURE: 158 MMHG | BODY MASS INDEX: 35.63 KG/M2 | HEIGHT: 67 IN | TEMPERATURE: 98.5 F

## 2023-08-22 DIAGNOSIS — R13.10 ODYNOPHAGIA: ICD-10-CM

## 2023-08-22 DIAGNOSIS — D50.8 OTHER IRON DEFICIENCY ANEMIA: Primary | ICD-10-CM

## 2023-08-22 PROCEDURE — 99204 OFFICE O/P NEW MOD 45 MIN: CPT | Performed by: PHYSICIAN ASSISTANT

## 2023-08-22 NOTE — TELEPHONE ENCOUNTER
Left message for patient that I will be mailing her blood work scripts but if she was to go to any [facility] lab they would have it in the data base

## 2023-08-22 NOTE — PROGRESS NOTES
[facility] - Outpatient Consultation  Humberto Jefferson 36 y.o. female MRN: [id number]  Encounter: [id number]          ASSESSMENT AND PLAN:      Miesha Mcneal is a 35 y/o female who presents for consultation for odynophagia. 1. Odynophagia, resolved  2. Globus sensation  3. GERD  Pt says a few weeks ago, she was having issues with painful swallowing. Strep A WNL. She says this lasted for about 2 weeks and resolved, but now is feeling that she has a "Weird feeling" in her esophagus when she swallows solids, not liquids. She says she can swallow without difficulty, but she feels as though "something" is there when she does. She does admit to feeling heartburn about once/week or so, which she was given PPI for in the past but has not been taking. -EGD ordered to rule out narrowing, EOE, candida  -please start using the omeprazole 20 mg daily as I explained poorly controlled reflux can cause this  -anti-gerd diet  -ED precautions discussed    ______________________________________________________________________    HPI:  Miesha Mcneal is a 35 y/o female who presents for consultation for odynophagia. Pt says a few weeks ago, she had painful swallowing for 2 weeks but this resolved. However, she now "Feeling something: in her esophagus when she swallows solid foods, but not liquids. She denies trouble swallowing in that the food goes down, but she "Feels something" there when she swallows. Pt denies n/v, abdominal pain, diarrhea, constipation, unintentional weight loss, fevers, chills, night sweats, frequent NSAID use. Pt does admit to heartburn intermittently but does not take anything for this as she was given omeprazole but did not use it consistently. REVIEW OF SYSTEMS:    CONSTITUTIONAL: Denies any fever, chills, rigors, and weight loss. HEENT: No earache or tinnitus. Denies hearing loss or visual disturbances. CARDIOVASCULAR: No chest pain or palpitations.    RESPIRATORY: Denies any cough, hemoptysis, shortness of breath or dyspnea on exertion. GASTROINTESTINAL: As noted in the History of Present Illness. GENITOURINARY: No problems with urination. Denies any hematuria or dysuria. NEUROLOGIC: No dizziness or vertigo, denies headaches. MUSCULOSKELETAL: Denies any muscle or joint pain. SKIN: Denies skin rashes or itching. ENDOCRINE: Denies excessive thirst. Denies intolerance to heat or cold. PSYCHOSOCIAL: Denies depression or anxiety. Denies any recent memory loss. Historical Information   Past Medical History:   Diagnosis Date   • Abnormal Pap smear of cervix    • Fibroid    • Heart murmur    • Iron deficiency anemia     due to heavy menstrual bleeding.  needed iron infusions   • Migraine      Past Surgical History:   Procedure Laterality Date   •  SECTION     • CHOLECYSTECTOMY     • COLPOSCOPY       Social History   Social History     Substance and Sexual Activity   Alcohol Use Yes    Comment: rare     Social History     Substance and Sexual Activity   Drug Use Never     Social History     Tobacco Use   Smoking Status Never   Smokeless Tobacco Never     Family History   Problem Relation Age of Onset   • Hypertension Mother    • Stomach cancer Mother    • Colon cancer Father    • Uterine cancer Sister    • No Known Problems Sister    • No Known Problems Sister    • No Known Problems Sister    • No Known Problems Brother    • No Known Problems Brother    • No Known Problems Brother    • Asthma Daughter    • No Known Problems Son    • No Known Problems Son    • No Known Problems Maternal Grandmother    • No Known Problems Maternal Grandfather    • No Known Problems Paternal Grandmother    • No Known Problems Paternal Grandfather        Meds/Allergies       Current Outpatient Medications:   •  cyclobenzaprine (FLEXERIL) 5 mg tablet  •  ferrous sulfate 324 MG TBEC  •  naproxen (EC NAPROSYN) 500 MG EC tablet  •  naproxen (Naprosyn) 500 mg tablet  •  omeprazole (PriLOSEC) 20 mg delayed release capsule  •  ondansetron (Zofran ODT) 4 mg disintegrating tablet  •  rizatriptan (MAXALT-MLT) 10 MG disintegrating tablet    No Known Allergies        Objective     not currently breastfeeding. There is no height or weight on file to calculate BMI. PHYSICAL EXAM:      General Appearance:   Alert, cooperative, no distress   HEENT:   Normocephalic, atraumatic, anicteric.     Neck:  Supple, symmetrical, trachea midline   Lungs:   Clear to auscultation bilaterally; no rales, rhonchi or wheezing; respirations unlabored    Heart[de-identified]   Regular rate and rhythm; no murmur, rub, or gallop. Abdomen:   Soft, non-tender, non-distended; normal bowel sounds; no masses, no organomegaly    Genitalia:   Deferred    Rectal:   Deferred    Extremities:  No cyanosis, clubbing or edema    Pulses:  2+ and symmetric    Skin:  No jaundice, rashes, or lesions    Lymph nodes:  No palpable cervical lymphadenopathy        Lab Results:   No visits with results within 1 Day(s) from this visit. Latest known visit with results is:   Office Visit on 07/07/2023   Component Date Value   •  RAPID STREP A 07/07/2023 Negative    • POCT SARS-CoV-2 Ag 07/07/2023 Negative    • VALID CONTROL 07/07/2023 Valid          Radiology Results:   No results found.

## 2023-08-22 NOTE — PATIENT INSTRUCTIONS
GERD (Gastroesophageal Reflux Disease)   WHAT YOU NEED TO KNOW:   Gastroesophageal reflux disease (GERD) is reflux that happens more than 2 times a week for a few weeks. Reflux means acid and food in your stomach back up into your esophagus. GERD can cause other health problems over time if it is not treated. DISCHARGE INSTRUCTIONS:   Call your local emergency number (911 in the [address]) if:   You have severe chest pain and sudden trouble breathing. Return to the emergency department if:   You have trouble breathing after you vomit. You have trouble swallowing, or pain with swallowing. Your bowel movements are black, bloody, or tarry-looking. Your vomit looks like coffee grounds or has blood in it. Call your doctor or gastroenterologist if:   You feel full and cannot burp or vomit. You vomit large amounts, or you vomit often. You are losing weight without trying. Your symptoms get worse or do not improve with treatment. You have questions or concerns about your condition or care. Medicines:   Medicines  are used to decrease stomach acid. Medicine may also be used to help your lower esophageal sphincter and stomach contract (tighten) more. Take your medicine as directed. Contact your healthcare provider if you think your medicine is not helping or if you have side effects. Tell your provider if you are allergic to any medicine. Keep a list of the medicines, vitamins, and herbs you take. Include the amounts, and when and why you take them. Bring the list or the pill bottles to follow-up visits. Carry your medicine list with you in case of an emergency. Manage GERD:       Do not have foods or drinks that may increase heartburn. These include chocolate, peppermint, fried or fatty foods, drinks that contain caffeine, or carbonated drinks (soda). Other foods include spicy foods, onions, tomatoes, and tomato-based foods.  Do not have foods or drinks that can irritate your esophagus, such as citrus fruits, juices, and alcohol. Do not eat large meals. When you eat a lot of food at one time, your stomach needs more acid to digest it. Eat 6 small meals each day instead of 3 large ones, and eat slowly. Do not eat meals 2 to 3 hours before bedtime. Elevate the head of your bed. Place 6-inch blocks under the head of your bed frame. You may also use more than one pillow under your head and shoulders while you sleep. Maintain a healthy weight. If you are overweight, weight loss may help relieve symptoms of GERD. Do not smoke. Smoking weakens the lower esophageal sphincter and increases the risk of GERD. Ask your healthcare provider for information if you currently smoke and need help to quit. E-cigarettes or smokeless tobacco still contain nicotine. Talk to your healthcare provider before you use these products. Do not put pressure on your abdomen. Pressure pushes acid up into your esophagus. Do not wear clothing that is tight around your waist. Do not bend over. Bend at the knees if you need to pick something up. Follow up with your doctor or gastroenterologist as directed:  Write down your questions so you remember to ask them during your visits. © Copyright Marky Phelps 2022 Information is for End User's use only and may not be sold, redistributed or otherwise used for commercial purposes. The above information is an  only. It is not intended as medical advice for individual conditions or treatments. Talk to your doctor, nurse or pharmacist before following any medical regimen to see if it is safe and effective for you.     Scheduled date of EGD as of today):8/31/23  Physician performing EGD/ Dr. Alysia Escudero  Location of EGD/[facility]  Desired bowel prep reviewed with patient:N/A  Instructions reviewed with patient by:Saadia  Clearances:   N/A

## 2023-08-23 ENCOUNTER — APPOINTMENT (OUTPATIENT)
Dept: LAB | Facility: CLINIC | Age: 43
End: 2023-08-23
Payer: COMMERCIAL

## 2023-08-23 DIAGNOSIS — D50.8 OTHER IRON DEFICIENCY ANEMIA: ICD-10-CM

## 2023-08-24 ENCOUNTER — APPOINTMENT (OUTPATIENT)
Dept: LAB | Facility: AMBULARY SURGERY CENTER | Age: 43
End: 2023-08-24
Payer: COMMERCIAL

## 2023-08-24 LAB
BASOPHILS # BLD AUTO: 0.06 THOUSANDS/ÂΜL (ref 0–0.1)
BASOPHILS NFR BLD AUTO: 1 % (ref 0–1)
EOSINOPHIL # BLD AUTO: 0.2 THOUSAND/ÂΜL (ref 0–0.61)
EOSINOPHIL NFR BLD AUTO: 3 % (ref 0–6)
ERYTHROCYTE [DISTWIDTH] IN BLOOD BY AUTOMATED COUNT: 20.2 % (ref 11.6–15.1)
FERRITIN SERPL-MCNC: 3 NG/ML (ref 11–307)
HCT VFR BLD AUTO: 30.3 % (ref 34.8–46.1)
HGB BLD-MCNC: 7.9 G/DL (ref 11.5–15.4)
IMM GRANULOCYTES # BLD AUTO: 0.02 THOUSAND/UL (ref 0–0.2)
IMM GRANULOCYTES NFR BLD AUTO: 0 % (ref 0–2)
IRON SATN MFR SERPL: 2 % (ref 15–50)
IRON SERPL-MCNC: 10 UG/DL (ref 50–212)
LYMPHOCYTES # BLD AUTO: 1.72 THOUSANDS/ÂΜL (ref 0.6–4.47)
LYMPHOCYTES NFR BLD AUTO: 26 % (ref 14–44)
MCH RBC QN AUTO: 17.7 PG (ref 26.8–34.3)
MCHC RBC AUTO-ENTMCNC: 26.1 G/DL (ref 31.4–37.4)
MCV RBC AUTO: 68 FL (ref 82–98)
MONOCYTES # BLD AUTO: 0.6 THOUSAND/ÂΜL (ref 0.17–1.22)
MONOCYTES NFR BLD AUTO: 9 % (ref 4–12)
NEUTROPHILS # BLD AUTO: 3.94 THOUSANDS/ÂΜL (ref 1.85–7.62)
NEUTS SEG NFR BLD AUTO: 61 % (ref 43–75)
NRBC BLD AUTO-RTO: 0 /100 WBCS
PLATELET # BLD AUTO: 561 THOUSANDS/UL (ref 149–390)
PMV BLD AUTO: 9 FL (ref 8.9–12.7)
RBC # BLD AUTO: 4.47 MILLION/UL (ref 3.81–5.12)
TIBC SERPL-MCNC: 404 UG/DL (ref 250–450)
UIBC SERPL-MCNC: 394 UG/DL (ref 155–355)
WBC # BLD AUTO: 6.54 THOUSAND/UL (ref 4.31–10.16)

## 2023-08-24 PROCEDURE — 82728 ASSAY OF FERRITIN: CPT

## 2023-08-24 PROCEDURE — 83550 IRON BINDING TEST: CPT

## 2023-08-24 PROCEDURE — 36415 COLL VENOUS BLD VENIPUNCTURE: CPT

## 2023-08-24 PROCEDURE — 85025 COMPLETE CBC W/AUTO DIFF WBC: CPT

## 2023-08-24 PROCEDURE — 83540 ASSAY OF IRON: CPT

## 2023-08-25 ENCOUNTER — TELEPHONE (OUTPATIENT)
Dept: FAMILY MEDICINE CLINIC | Facility: CLINIC | Age: 43
End: 2023-08-25

## 2023-08-25 ENCOUNTER — TELEPHONE (OUTPATIENT)
Age: 43
End: 2023-08-25

## 2023-08-25 DIAGNOSIS — D50.9 IRON DEFICIENCY ANEMIA, UNSPECIFIED IRON DEFICIENCY ANEMIA TYPE: ICD-10-CM

## 2023-08-25 DIAGNOSIS — D50.8 OTHER IRON DEFICIENCY ANEMIA: Primary | ICD-10-CM

## 2023-08-25 NOTE — TELEPHONE ENCOUNTER
Patient returning office call, I reviewed results with patient. She reports she is not having any lightheadedness or dizziness, no black or bloody stools. She is experiencing headaches and very tired all day. She is on oral iron supplements 324 mg daily. She has seen a hematologist 5-6 years ago but not in [place]. Never has been on IV iron infusions.

## 2023-08-25 NOTE — TELEPHONE ENCOUNTER
----- Message from Lorrie May PA-C sent at 8/25/2023  8:13 AM EDT -----  Regarding: Severe iron deficiency anemia  Hello, patient of Vishal Samuels PA-C     Please call patient and let her know that I reviewed her labs   Her Hgb / blood count is low, down 2 grams since last draw. Hgb currently ~ 7   Her iron studies are also showing significant iron deficiency anemia. How is she feeling? Any dizziness? Lightheadedness? Black or bloody stool? Is she on oral iron? Has she seen hematology in the past/ ever have IV iron infusions? Let me know when you can please. Thanks!   Sophia Shetty

## 2023-08-28 ENCOUNTER — TELEPHONE (OUTPATIENT)
Age: 43
End: 2023-08-28

## 2023-08-28 ENCOUNTER — TELEPHONE (OUTPATIENT)
Dept: HEMATOLOGY ONCOLOGY | Facility: CLINIC | Age: 43
End: 2023-08-28

## 2023-08-28 ENCOUNTER — NURSE TRIAGE (OUTPATIENT)
Age: 43
End: 2023-08-28

## 2023-08-28 ENCOUNTER — PREP FOR PROCEDURE (OUTPATIENT)
Dept: GASTROENTEROLOGY | Facility: CLINIC | Age: 43
End: 2023-08-28

## 2023-08-28 DIAGNOSIS — D50.9 IRON DEFICIENCY ANEMIA, UNSPECIFIED IRON DEFICIENCY ANEMIA TYPE: Primary | ICD-10-CM

## 2023-08-28 NOTE — TELEPHONE ENCOUNTER
Scheduled date of colonoscopy/egd (as of today): 08/31/2023  Physician performing colonoscopy: Dr. Bettina Banegas   Location of colonoscopy: [facility]  Bowel prep reviewed with patient: Jaylene Weinberg   Instructions reviewed with patient by: Tommy peña w/ pt on phone/emailed prep   Clearances: N/A

## 2023-08-28 NOTE — TELEPHONE ENCOUNTER
Spoke with patient and advised that she increase her Iron supplement to BID and to take an OTC vitamin C supplement to help with absorption. Patient has an EGD/colonoscopy scheduled as well as an evaluation with Hematology.

## 2023-08-28 NOTE — TELEPHONE ENCOUNTER
Spoke with patient. Relayed message to patient. Patient is willing to have a colonoscopy added onto the EGD. Please call patient to discuss prep.

## 2023-08-28 NOTE — TELEPHONE ENCOUNTER
Reviewed all Golytely procedure directions with patient in detail. Patient will contact our office with any further questions.

## 2023-08-28 NOTE — TELEPHONE ENCOUNTER
I called Herminia Guzman in response to a referral that was received for patient to establish care with Hematology. Outreach was made to schedule a consultation. I left a voicemail explaining the reason for my call and advised patient to call [facility] at [phone]. Another attempt will be made to contact patient.

## 2023-08-31 ENCOUNTER — NURSE TRIAGE (OUTPATIENT)
Dept: OTHER | Facility: OTHER | Age: 43
End: 2023-08-31

## 2023-08-31 NOTE — TELEPHONE ENCOUNTER
Patient called in to report nausea and vomiting with Golytely bowel prep 8/30 evening and NO bowel cleansing. Patient scheduled for colonoscopy/EGD this morning at 11 AM. Please cancel procedures and reschedule patient with different prep. Reason for Disposition  • [1] Caller has URGENT question or concern AND [2] triager unable to answer question    Answer Assessment - Initial Assessment Questions  1. DATE//TIME: "When did you have your colonoscopy?"       Today 8/31/23 at 11 AM  2. MAIN CONCERN: "What is your main concern right now?" "What questions do you have?"     Nausea and vomiting with prep last evening; did not complete and no bowel cleansing done  3. ABDOMEN PAIN: "Are you having any abdomen (belly or stomach) pain?" If Yes, ask: "How bad is it?" (e.g., Scale 1-10; mild, moderate, severe). - MILD (1-3): doesn't interfere with normal activities, abdomen soft and not tender to touch      - MODERATE (4-7): interferes with normal activities or awakens from sleep, tender to touch      - SEVERE (8-10): excruciating pain, doubled over, unable to do any normal activities       n/a  4. OTHER SYMPTOMS: "What other symptoms are you having?" (e.g., rectal bleeding, bloating or feeling gassy, passing gas, vomiting, dizziness, fever). N/a  5. ONSET: "When did your symptoms start?"      8/30 evening  6.  PATTERN: "Is the symptom(s) constant or does it come and go?" "Is your symptom(s) getting worse, better, or staying the same?"      n/a    Protocols used: COLONOSCOPY SYMPTOMS AND QUESTIONS-ADULT-AH

## 2023-08-31 NOTE — TELEPHONE ENCOUNTER
Regarding: Colonoscopy prep issues  ----- Message from Rosy Coleman sent at 8/31/2023  8:31 AM EDT -----  " I am supposed to have Colonoscopy today.  I was not able to keep the bowel prep liquid down and have not gone to the bathroom."

## 2023-09-08 ENCOUNTER — TELEPHONE (OUTPATIENT)
Dept: HEMATOLOGY ONCOLOGY | Facility: CLINIC | Age: 43
End: 2023-09-08

## 2023-09-08 NOTE — TELEPHONE ENCOUNTER
Patient had appt with Kirill Holliday today, but came to [address] [place] [address]. Patient did not want to go to [facility].  Reschedule patient with Meli Alejandro at [facility] for 10/13/2023 at 9:00am

## 2023-09-18 DIAGNOSIS — D50.8 OTHER IRON DEFICIENCY ANEMIA: ICD-10-CM

## 2023-09-18 RX ORDER — FERROUS SULFATE TAB EC 324 MG (65 MG FE EQUIVALENT) 324 (65 FE) MG
324 TABLET DELAYED RESPONSE ORAL 2 TIMES DAILY
Qty: 60 TABLET | Refills: 3 | Status: SHIPPED | OUTPATIENT
Start: 2023-09-18

## 2023-09-22 ENCOUNTER — APPOINTMENT (OUTPATIENT)
Dept: LAB | Facility: MEDICAL CENTER | Age: 43
End: 2023-09-22

## 2023-10-11 RX ORDER — SODIUM CHLORIDE, SODIUM LACTATE, POTASSIUM CHLORIDE, CALCIUM CHLORIDE 600; 310; 30; 20 MG/100ML; MG/100ML; MG/100ML; MG/100ML
75 INJECTION, SOLUTION INTRAVENOUS CONTINUOUS
Status: CANCELLED | OUTPATIENT
Start: 2023-10-11

## 2023-10-12 ENCOUNTER — HOSPITAL ENCOUNTER (OUTPATIENT)
Dept: GASTROENTEROLOGY | Facility: HOSPITAL | Age: 43
Setting detail: OUTPATIENT SURGERY
End: 2023-10-12
Payer: COMMERCIAL

## 2023-10-12 ENCOUNTER — ANESTHESIA EVENT (OUTPATIENT)
Dept: GASTROENTEROLOGY | Facility: HOSPITAL | Age: 43
End: 2023-10-12

## 2023-10-12 ENCOUNTER — ANESTHESIA (OUTPATIENT)
Dept: GASTROENTEROLOGY | Facility: HOSPITAL | Age: 43
End: 2023-10-12

## 2023-10-12 VITALS
WEIGHT: 220 LBS | HEIGHT: 67 IN | RESPIRATION RATE: 18 BRPM | OXYGEN SATURATION: 99 % | TEMPERATURE: 97.2 F | BODY MASS INDEX: 34.53 KG/M2 | HEART RATE: 77 BPM | DIASTOLIC BLOOD PRESSURE: 95 MMHG | SYSTOLIC BLOOD PRESSURE: 165 MMHG

## 2023-10-12 DIAGNOSIS — D50.9 IRON DEFICIENCY ANEMIA, UNSPECIFIED IRON DEFICIENCY ANEMIA TYPE: ICD-10-CM

## 2023-10-12 DIAGNOSIS — R13.10 ODYNOPHAGIA: ICD-10-CM

## 2023-10-12 PROBLEM — K21.9 GASTROESOPHAGEAL REFLUX DISEASE: Status: ACTIVE | Noted: 2023-10-12

## 2023-10-12 LAB
EXT PREGNANCY TEST URINE: NEGATIVE
EXT. CONTROL: NORMAL

## 2023-10-12 PROCEDURE — 81025 URINE PREGNANCY TEST: CPT | Performed by: ANESTHESIOLOGY

## 2023-10-12 PROCEDURE — 88305 TISSUE EXAM BY PATHOLOGIST: CPT | Performed by: PATHOLOGY

## 2023-10-12 RX ORDER — SODIUM CHLORIDE, SODIUM LACTATE, POTASSIUM CHLORIDE, CALCIUM CHLORIDE 600; 310; 30; 20 MG/100ML; MG/100ML; MG/100ML; MG/100ML
75 INJECTION, SOLUTION INTRAVENOUS CONTINUOUS
Status: DISCONTINUED | OUTPATIENT
Start: 2023-10-12 | End: 2023-10-16 | Stop reason: HOSPADM

## 2023-10-12 RX ORDER — LABETALOL HYDROCHLORIDE 5 MG/ML
INJECTION, SOLUTION INTRAVENOUS AS NEEDED
Status: DISCONTINUED | OUTPATIENT
Start: 2023-10-12 | End: 2023-10-12

## 2023-10-12 RX ORDER — PROPOFOL 10 MG/ML
INJECTION, EMULSION INTRAVENOUS AS NEEDED
Status: DISCONTINUED | OUTPATIENT
Start: 2023-10-12 | End: 2023-10-12

## 2023-10-12 RX ORDER — GLYCOPYRROLATE 0.2 MG/ML
INJECTION INTRAMUSCULAR; INTRAVENOUS AS NEEDED
Status: DISCONTINUED | OUTPATIENT
Start: 2023-10-12 | End: 2023-10-12

## 2023-10-12 RX ORDER — LIDOCAINE HYDROCHLORIDE 10 MG/ML
INJECTION, SOLUTION EPIDURAL; INFILTRATION; INTRACAUDAL; PERINEURAL AS NEEDED
Status: DISCONTINUED | OUTPATIENT
Start: 2023-10-12 | End: 2023-10-12

## 2023-10-12 RX ADMIN — LIDOCAINE HYDROCHLORIDE 50 MG: 10 INJECTION, SOLUTION EPIDURAL; INFILTRATION; INTRACAUDAL; PERINEURAL at 09:53

## 2023-10-12 RX ADMIN — SODIUM CHLORIDE, SODIUM LACTATE, POTASSIUM CHLORIDE, AND CALCIUM CHLORIDE: .6; .31; .03; .02 INJECTION, SOLUTION INTRAVENOUS at 09:49

## 2023-10-12 RX ADMIN — PROPOFOL 120 MCG/KG/MIN: 10 INJECTION, EMULSION INTRAVENOUS at 09:56

## 2023-10-12 RX ADMIN — PROPOFOL 30 MG: 10 INJECTION, EMULSION INTRAVENOUS at 10:18

## 2023-10-12 RX ADMIN — PROPOFOL 100 MCG/KG/MIN: 10 INJECTION, EMULSION INTRAVENOUS at 09:53

## 2023-10-12 RX ADMIN — PROPOFOL 50 MG: 10 INJECTION, EMULSION INTRAVENOUS at 10:02

## 2023-10-12 RX ADMIN — PROPOFOL 100 MG: 10 INJECTION, EMULSION INTRAVENOUS at 09:53

## 2023-10-12 RX ADMIN — GLYCOPYRROLATE 0.1 MG: 0.2 INJECTION INTRAMUSCULAR; INTRAVENOUS at 09:56

## 2023-10-12 RX ADMIN — LABETALOL HYDROCHLORIDE 5 MG: 5 INJECTION, SOLUTION INTRAVENOUS at 10:02

## 2023-10-12 NOTE — PROGRESS NOTES
[facility] [place]  [address] [place] [address]  Hematology Ambulatory Consult  Humberto Jefferson, 1980, [id number]  10/12/2023      Assessment and Plan   1. Iron deficiency anemia due to chronic blood loss; 2. Other iron deficiency anemia; 3. Microcytic anemia  History of microcytic anemia dating back to at least 10/2020 with associated low ferritin levels  consistent with iron deficiency anemia. She has been seen by hematology in [facility] previously and has been on oral iron twice daily for many years. Has not received IV iron previously. EGD/Colonoscopy completed yesterday without evidence of bleeding. Biopsies were taken r/o celiacs, H Pylori. 5 polyps removed. Biopsy results and pathology are in process. Patient does report menorrhagia. No other signs of bleeding or history of malabsorptive conditions. This is likely the source of her JAVAD. Will check reticulocyte count to evaluate bone marrow. Last Ferritin level is 5 despite being on oral iron therapy twice daily. Recommend IV Venofer 200mg weekly x 7 for symptomatic anemia. Reviewed the reasoning, process and side effect profile including headaches, myalgias, nausea, tattooing of the skin and anaphylactic reaction. Requested that she obtain updated labs today prior to initiation of therapy. Will have patient have follow up labs and RTC in approximately 3 months to monitor response to iron therapy. If anemia persist despite iron replacement, she will require further evaluation of her anemia. - Ambulatory Referral to Hematology / Oncology  - CBC and differential; Future  - Iron Panel (Includes Ferritin, Iron Sat%, Iron, and TIBC); Future  - Retic Count; Future  - Ambulatory referral to Gynecologic Oncology; Future  - CBC and differential; Future  - Iron Panel (Includes Ferritin, Iron Sat%, Iron, and TIBC); Future    4.  Breast cancer screening by mammogram.   Last mammogram in 2022. She is due again now. Denies breast changes. New order placed. - Mammo screening bilateral w 3d & cad; Future    The patient is scheduled for follow-up in approximately 3 months   Patient voiced agreement and understanding to the above. Patient knows to call the Hematology/Oncology office with any questions and concerns regarding the above. Barrier(s) to care: None. The patient is able to self care. Subjective   Anemia     Referring provider    Maryelizabeth Sever, PA-C  [address]    History of present illness: patient is a 36yo female with PMHx of uterine fibroids and migraines who presents as consultation for iron deficiency anemia. Patient reports history of iron deficiency anemia. Previously seen by hematology clinic in [facility]  5 years ago. Had been on oral iron twice daily for many years. Has not received IV Iron infusions or required blood transfusions in the past. Denies known history of thalassemia or sickle cell disease. Her sisters child does have thalassemia. 10/2020: hgb 10.1, MCV 79, plat 469k. ferritin 5     2022 Pelvic US: demonstrated heterogenous fibroid uterus    2023: wbc 6.54, hgb 7.9, MCV 68, plat 561k. Iron sat 2%, Iron 10, TIBC 404, Ferritin 3     EGD /Colonoscopy 10/12/23: 2cm hiatal hernia. Biopsies obtained to r/u H pylori and celiacs disease. 5 polyps removed. Repeat colonoscopy in 1 year. Patient complains of fatigue. Denies PICA, SOB, CP, Lightheaded, dizzy, bleeding. No hematuria, hematochezia, or melena. No history of celiacs, malabsorption d/o, or gastric bypass. Admits to long history of menorrhagia, ovarian cysts and uterine fibroids. Had endometrial biopsy in 2019 which revealed benign proliferative endometrium. She tried depo injection 1 time and then had discontinued therapy due to headaches. Last seen by OB in . [facility] in July . She has history of irregular cycles.  When she does menstruate, she changes menstrual product every 2 hours for the first 4 days. Bleeding will last anywhere from 7-10 days. She tried depo injection 1 time and then had discontinued therapy due to headaches. She is not on any birth control. She is currently not sexually active. Does not eat much red meat. Primary source of protein is chicken, fish, beans. Patient is not on blood thinners. She takes pantoprazole as needed. 2cm hiatal hernia found on most recent EGD. Denies constitutional symptoms such as fever, chills, night sweats, lymphadenopathy, or unintentional weight loss. Rare alcohol use 1-2 times per year. Never smoker. No drug use. Works as nurse. No personal history of cancer. Mother had history of "stomach" cancer and father had prostate cancer. Brother ?lung cancer (non-smoker). Last mammogram 2022. Review of Systems   Constitutional:  Positive for fatigue. Negative for chills, fever and unexpected weight change. Eyes:  Negative for visual disturbance. Respiratory:  Negative for shortness of breath. Cardiovascular:  Negative for chest pain and palpitations. Gastrointestinal:  Negative for blood in stool and vomiting. Genitourinary:  Negative for hematuria. Skin:  Negative for color change and rash. Neurological:  Negative for dizziness, light-headedness and headaches. Hematological:  Negative for adenopathy. Does not bruise/bleed easily. All other systems reviewed and are negative. Past Medical History:   Diagnosis Date    Abnormal Pap smear of cervix     Fibroid     Heart murmur     Iron deficiency anemia     due to heavy menstrual bleeding.  needed iron infusions    Migraine      Past Surgical History:   Procedure Laterality Date     SECTION      CHOLECYSTECTOMY      COLPOSCOPY       Family History   Problem Relation Age of Onset    Hypertension Mother     Stomach cancer Mother     Colon cancer Father     Uterine cancer Sister     No Known Problems Sister     No Known Problems Sister     No Known Problems Sister     Lung cancer Brother     No Known Problems Brother     No Known Problems Brother     No Known Problems Maternal Grandmother     No Known Problems Maternal Grandfather     No Known Problems Paternal Grandmother     No Known Problems Paternal Grandfather     Asthma Daughter     No Known Problems Son     No Known Problems Son      Social History     Socioeconomic History    Marital status: Single     Spouse name: Not on file    Number of children: Not on file    Years of education: Not on file    Highest education level: Not on file   Occupational History    Not on file   Tobacco Use    Smoking status: Never    Smokeless tobacco: Never   Vaping Use    Vaping Use: Never used   Substance and Sexual Activity    Alcohol use: Yes     Comment: rare    Drug use: Never    Sexual activity: Yes     Partners: Male     Birth control/protection: Condom Male, None   Other Topics Concern    Not on file   Social History Narrative    Not on file     Social Determinants of Health     Financial Resource Strain: Low Risk  (5/15/2023)    Overall Financial Resource Strain (CARDIA)     Difficulty of Paying Living Expenses: Not very hard   Food Insecurity: No Food Insecurity (5/15/2023)    Hunger Vital Sign     Worried About Running Out of Food in the Last Year: Never true     Ran Out of Food in the Last Year: Never true   Transportation Needs: No Transportation Needs (5/15/2023)    PRAPARE - Transportation     Lack of Transportation (Medical): No     Lack of Transportation (Non-Medical):  No   Physical Activity: Insufficiently Active (5/15/2023)    Exercise Vital Sign     Days of Exercise per Week: 2 days     Minutes of Exercise per Session: 20 min   Stress: No Stress Concern Present (5/15/2023)    [facility]     Feeling of Stress : Not at all   Social Connections: Not on file   Intimate Partner Violence: Not At Risk (5/15/2023) Humiliation, Afraid, Rape, and Kick questionnaire     Fear of Current or Ex-Partner: No     Emotionally Abused: No     Physically Abused: No     Sexually Abused: No   Housing Stability: Low Risk  (5/15/2023)    Housing Stability Vital Sign     Unable to Pay for Housing in the Last Year: No     Number of Places Lived in the Last Year: 1     Unstable Housing in the Last Year: No         Current Outpatient Medications:     cyclobenzaprine (FLEXERIL) 5 mg tablet, Take 1 tablet (5 mg total) by mouth daily at bedtime (Patient taking differently: Take 5 mg by mouth daily at bedtime PRN only), Disp: 20 tablet, Rfl: 0    ferrous sulfate 324 (65 Fe) mg, take 1 tablet by mouth twice a day, Disp: 60 tablet, Rfl: 3    naproxen (EC NAPROSYN) 500 MG EC tablet, Take 1 tablet (500 mg total) by mouth 2 (two) times a day with meals (Patient taking differently: Take 500 mg by mouth 2 (two) times a day with meals PRN only), Disp: 30 tablet, Rfl: 0    naproxen (Naprosyn) 500 mg tablet, Take 1 tablet (500 mg total) by mouth 2 (two) times a day with meals (Patient taking differently: Take 500 mg by mouth 2 (two) times a day with meals PRN only), Disp: 60 tablet, Rfl: 0    omeprazole (PriLOSEC) 20 mg delayed release capsule, Take 1 capsule (20 mg total) by mouth daily before breakfast (Patient taking differently: Take 20 mg by mouth daily before breakfast PRN only), Disp: 30 capsule, Rfl: 0    ondansetron (Zofran ODT) 4 mg disintegrating tablet, Take 1 tablet (4 mg total) by mouth every 6 (six) hours as needed for nausea or vomiting, Disp: 20 tablet, Rfl: 0    polyethylene glycol (GOLYTELY) 4000 mL solution, Take 4,000 mL by mouth once for 1 dose Take as directed by office instructions prior to colonoscopy., Disp: 4000 mL, Rfl: 0    rizatriptan (MAXALT-MLT) 10 MG disintegrating tablet, Take 1 tablet (10 mg total) by mouth once as needed for migraine for up to 1 dose May repeat in 2 hours if needed, Disp: 9 tablet, Rfl: 3  No current facility-administered medications for this visit. Facility-Administered Medications Ordered in Other Visits:     glycopyrrolate (ROBINUL) injection, , Intravenous, PRN, Josie Sale, CRNA, 0.1 mg at 10/12/23 0956    labetalol (NORMODYNE) injection, , Intravenous, PRN, Josie Sale, CRNA, 5 mg at 10/12/23 1002    lactated ringers infusion, 75 mL/hr, Intravenous, Continuous, Kylah Regan MD, New Bag at 10/12/23 0949    lidocaine (PF) (XYLOCAINE-MPF) 1 % injection, , Intravenous, PRN, Josie Sale, CRNA, 50 mg at 10/12/23 0953    propofol (DIPRIVAN) 1000 mg in 100 mL infusion (premix), , Intravenous, PRN, Josie Sale, CRNA, Last Rate: 71.856 mL/hr at 10/12/23 0956, 120 mcg/kg/min at 10/12/23 0956    propofol (DIPRIVAN) 200 MG/20ML bolus injection, , Intravenous, PRN, Josie Sale, CRNA, 30 mg at 10/12/23 1018  No Known Allergies    Objective   There were no vitals taken for this visit. Physical Exam  Vitals reviewed. HENT:      Head: Normocephalic. Eyes:      General: No scleral icterus. Extraocular Movements: Extraocular movements intact. Pupils: Pupils are equal, round, and reactive to light. Cardiovascular:      Rate and Rhythm: Normal rate and regular rhythm. Pulmonary:      Effort: Pulmonary effort is normal.      Breath sounds: Normal breath sounds. Abdominal:      Palpations: Abdomen is soft. Tenderness: There is no abdominal tenderness. Musculoskeletal:         General: Normal range of motion. Cervical back: Neck supple. Lymphadenopathy:      Cervical: No cervical adenopathy. Skin:     General: Skin is warm and dry. Coloration: Skin is not jaundiced. Findings: No rash. Neurological:      Mental Status: She is alert. Mental status is at baseline.    Psychiatric:         Mood and Affect: Mood normal.         Result Review  Labs:    Imaging:   Narrative & Impression   PELVIC ULTRASOUND, COMPLETE 06/17/2022      INDICATION:  N83.201: Unspecified ovarian cyst, right side. The patient is 42years old with LMP 6/6/2022. COMPARISON: Pelvic ultrasound 10/9/2019     TECHNIQUE:   Transabdominal pelvic ultrasound was performed in sagittal and transverse planes with a curvilinear transducer. Additional transvaginal imaging was performed to better evaluate the endometrium and ovaries. Imaging included volumetric   sweeps as well as traditional still imaging technique. FINDINGS:     UTERUS:  The uterus is anteverted in position, measuring 11.9 x 6.3 x 6.3 cm. The cervix appears within normal limits. Small nabothian cysts. Heterogeneous myometrium with multiple fibroids again noted. -2.5 x 2.4 x 2.3 cm intramural fibroid may correspond to a 1.4 x 1.3 x 1.1 cm fibroid on prior study.     -3.0 x 2.6 x 3.1 cm anterior mid body fibroid either bulging the uterine contour or demonstrating an exophytic component. Unclear if this corresponds to the previous 2.4 cm fibroid given differences in uterine position on the current study.     -4.4 x 2.8 x 4.6 cm subserosal posterior uterine body/lower uterine segment fibroid. ENDOMETRIUM:    The endometrial echo complex has an AP caliber of 8.0 mm. Its appearance is within normal limits for age and cycle and shows no filling defects. OVARIES/ADNEXA:  Right ovary:  3.4 x 2.8 x 2.4 cm. 12.1 mL  1.7 x 1.4 cm minimally complicated follicle. A 3 cm thick-walled cyst with irregular margins and low-level internal echoes was described on previous imaging, not visualized on the current exam.  Doppler flow within normal limits. Left ovary:  2.3 x 2.1 x 1.5 cm. 3.9 mL  No suspicious left ovarian abnormality. Doppler flow within normal limits. No suspicious adnexal mass or loculated collections. Trace free fluid. IMPRESSION:     1.7 x 1.4 cm minimally complicated right ovarian follicle, likely benign.   3 cm thick-walled right ovarian cyst with irregular margins and low-level internal echoes described on previous imaging is not visualized on the current exam.     Heterogeneous fibroid uterus with some interval enlargement of previous fibroids are suggested. Please note: This report has been generated by a voice recognition software system. Therefore there may be syntax, spelling, and/or grammatical errors. Please call if you have any questions.

## 2023-10-12 NOTE — ANESTHESIA PREPROCEDURE EVALUATION
Procedure:  EGD  COLONOSCOPY    Relevant Problems   CARDIO   (+) Migraine with aura and without status migrainosus, not intractable      GI/HEPATIC   (+) Gastroesophageal reflux disease      HEMATOLOGY   (+) Iron deficiency anemia      NEURO/PSYCH   (+) Chronic tension-type headache, intractable   (+) Migraine with aura and without status migrainosus, not intractable        Physical Exam    Airway    Mallampati score: II  TM Distance: >3 FB  Neck ROM: full     Dental       Cardiovascular  Rhythm: regular, Rate: normal    Pulmonary   Breath sounds clear to auscultation    Other Findings        Anesthesia Plan  ASA Score- 2     Anesthesia Type- IV sedation with anesthesia with ASA Monitors. Additional Monitors:     Airway Plan:            Plan Factors-    Chart reviewed. Patient is not a current smoker. Induction- intravenous. Postoperative Plan-     Informed Consent- Anesthetic plan and risks discussed with patient. I personally reviewed this patient with the CRNA. Discussed and agreed on the Anesthesia Plan with the CRNA. Zuleyka Ramirez

## 2023-10-12 NOTE — DISCHARGE INSTRUCTIONS
Upper Endoscopy and Colonoscopy  WHAT YOU NEED TO KNOW:   An upper endoscopy is also called an upper gastrointestinal (GI) endoscopy, or an esophagogastroduodenoscopy (EGD). A colonoscopy is a procedure to examine the inside of your colon (intestine) with a scope. Polyps or tissue growths may have been removed during your colonoscopy. It is normal to feel bloated and to have some abdominal discomfort. Your throat may be sore for 24 to 36 hours. You may burp or pass gas from air that is still inside your body. If you have hemorrhoids or you had polyps removed, you may have a small amount of bleeding. DISCHARGE INSTRUCTIONS:      Seek care immediately if:   You feel dizzy or faint. You have sudden chest pain or trouble breathing. You have a large amount of bright red blood in your bowel movements. Your abdomen is hard and firm and you have severe pain. You vomit blood. Contact your healthcare provider if:   You feel full or bloated and cannot burp or pass gas. You have not had a bowel movement for 3 days after your procedure. You have neck pain. You have a fever or chills. You have nausea or are vomiting. You have a rash or hives. You have questions or concerns about your endoscopy. Relieve a sore throat:  Suck on throat lozenges or crushed ice. Gargle with a small amount of warm salt water. Mix 1 teaspoon of salt and 1 cup of warm water to make salt water. Relieve gas and discomfort from bloating:  Lie on your right side with a heating pad on your abdomen. Take short walks to help pass gas. Eat small meals until bloating is relieved. Rest after your procedure:  Do not drive or make important decisions until the day after your procedure. Return to your normal activity as directed. You can usually return to work the day after your procedure.     Follow up with your healthcare provider as directed:  Write down your questions so you remember to ask them during your visits.

## 2023-10-12 NOTE — ANESTHESIA POSTPROCEDURE EVALUATION
Post-Op Assessment Note    CV Status:  Stable    Pain management: adequate     Mental Status:  Sleepy   Hydration Status:  Euvolemic   PONV Controlled:  Controlled   Airway Patency:  Patent      Post Op Vitals Reviewed: Yes      Staff: CRNA     No notable events documented.     BP   93/56   Temp     Pulse  79   Resp      SpO2  100

## 2023-10-12 NOTE — H&P
History and Physical - [facility]  Jacques Ferro 39 y.o. female MRN: [id number]      HPI: Jacques Ferro is a 39y.o. year-old female who presents for iron deficiency anemia. REVIEW OF SYSTEMS: Per the HPI, and otherwise unremarkable. PAST MEDICAL/SURGICAL HISTORY:  Past Medical History:   Diagnosis Date    Abnormal Pap smear of cervix     Fibroid     Heart murmur     Iron deficiency anemia     due to heavy menstrual bleeding.  needed iron infusions    Migraine         Past Surgical History:   Procedure Laterality Date     SECTION      CHOLECYSTECTOMY      COLPOSCOPY         FAMILY/SOCIAL HISTORY:  Family History   Problem Relation Age of Onset    Hypertension Mother     Stomach cancer Mother     Colon cancer Father     Uterine cancer Sister     No Known Problems Sister     No Known Problems Sister     No Known Problems Sister     Lung cancer Brother     No Known Problems Brother     No Known Problems Brother     No Known Problems Maternal Grandmother     No Known Problems Maternal Grandfather     No Known Problems Paternal Grandmother     No Known Problems Paternal Grandfather     Asthma Daughter     No Known Problems Son     No Known Problems Son        Social History     Tobacco Use    Smoking status: Never    Smokeless tobacco: Never   Vaping Use    Vaping Use: Never used   Substance Use Topics    Alcohol use: Yes     Comment: rare    Drug use: Never       Meds/Allergies       Current Outpatient Medications:     ferrous sulfate 324 (65 Fe) mg    cyclobenzaprine (FLEXERIL) 5 mg tablet    naproxen (EC NAPROSYN) 500 MG EC tablet    naproxen (Naprosyn) 500 mg tablet    omeprazole (PriLOSEC) 20 mg delayed release capsule    ondansetron (Zofran ODT) 4 mg disintegrating tablet    polyethylene glycol (GOLYTELY) 4000 mL solution    rizatriptan (MAXALT-MLT) 10 MG disintegrating tablet    Current Facility-Administered Medications:     lactated ringers infusion, 75 mL/hr, Intravenous, Continuous    No Known Allergies    Objective     /90   Pulse 75   Temp (!) 96.9 °F (36.1 °C) (Temporal)   Resp 18   Ht 5' 7" (1.702 m)   Wt 99.8 kg (220 lb)   SpO2 99%   BMI 34.46 kg/m²   PHYSICAL EXAM    GEN: NAD  CARDIO: RRR  PULM: CTA bilaterally  ABD: soft, non-tender, non-distended  EXT: no lower extremity edema  NEURO: AAOx3    ASSESSMENT/PLAN:  39y.o. year old female here for EGD/COY. she is stable and optimized for her procedure. Informed consent was obtained for the procedure. Risks of infection, perforation and hemorrhage were discussed. The patient was agreeable to proceed with the procedure.

## 2023-10-13 ENCOUNTER — CONSULT (OUTPATIENT)
Dept: HEMATOLOGY ONCOLOGY | Facility: CLINIC | Age: 43
End: 2023-10-13

## 2023-10-13 ENCOUNTER — TELEPHONE (OUTPATIENT)
Dept: HEMATOLOGY ONCOLOGY | Facility: CLINIC | Age: 43
End: 2023-10-13

## 2023-10-13 ENCOUNTER — APPOINTMENT (OUTPATIENT)
Dept: LAB | Facility: CLINIC | Age: 43
End: 2023-10-13
Payer: COMMERCIAL

## 2023-10-13 VITALS
SYSTOLIC BLOOD PRESSURE: 142 MMHG | WEIGHT: 224 LBS | TEMPERATURE: 97.8 F | BODY MASS INDEX: 35.16 KG/M2 | DIASTOLIC BLOOD PRESSURE: 88 MMHG | RESPIRATION RATE: 17 BRPM | HEART RATE: 99 BPM | HEIGHT: 67 IN | OXYGEN SATURATION: 100 %

## 2023-10-13 DIAGNOSIS — D50.9 MICROCYTIC ANEMIA: Primary | ICD-10-CM

## 2023-10-13 DIAGNOSIS — D50.0 IRON DEFICIENCY ANEMIA DUE TO CHRONIC BLOOD LOSS: Primary | ICD-10-CM

## 2023-10-13 DIAGNOSIS — D50.0 IRON DEFICIENCY ANEMIA DUE TO CHRONIC BLOOD LOSS: ICD-10-CM

## 2023-10-13 DIAGNOSIS — Z12.31 BREAST CANCER SCREENING BY MAMMOGRAM: ICD-10-CM

## 2023-10-13 LAB
ANISOCYTOSIS BLD QL SMEAR: PRESENT
BASOPHILS # BLD AUTO: 0.06 THOUSANDS/ÂΜL (ref 0–0.1)
BASOPHILS NFR BLD AUTO: 1 % (ref 0–1)
EOSINOPHIL # BLD AUTO: 0.21 THOUSAND/ÂΜL (ref 0–0.61)
EOSINOPHIL NFR BLD AUTO: 3 % (ref 0–6)
ERYTHROCYTE [DISTWIDTH] IN BLOOD BY AUTOMATED COUNT: 22.5 % (ref 11.6–15.1)
FERRITIN SERPL-MCNC: 3 NG/ML (ref 11–307)
HCT VFR BLD AUTO: 33.5 % (ref 34.8–46.1)
HGB BLD-MCNC: 8.9 G/DL (ref 11.5–15.4)
HYPERCHROMIA BLD QL SMEAR: PRESENT
IMM GRANULOCYTES # BLD AUTO: 0.03 THOUSAND/UL (ref 0–0.2)
IMM GRANULOCYTES NFR BLD AUTO: 0 % (ref 0–2)
IRON SATN MFR SERPL: 3 % (ref 15–50)
IRON SERPL-MCNC: 12 UG/DL (ref 50–212)
LYMPHOCYTES # BLD AUTO: 1.99 THOUSANDS/ÂΜL (ref 0.6–4.47)
LYMPHOCYTES NFR BLD AUTO: 25 % (ref 14–44)
MCH RBC QN AUTO: 18.1 PG (ref 26.8–34.3)
MCHC RBC AUTO-ENTMCNC: 26.6 G/DL (ref 31.4–37.4)
MCV RBC AUTO: 68 FL (ref 82–98)
MONOCYTES # BLD AUTO: 0.53 THOUSAND/ÂΜL (ref 0.17–1.22)
MONOCYTES NFR BLD AUTO: 7 % (ref 4–12)
NEUTROPHILS # BLD AUTO: 5.01 THOUSANDS/ÂΜL (ref 1.85–7.62)
NEUTS SEG NFR BLD AUTO: 64 % (ref 43–75)
NRBC BLD AUTO-RTO: 0 /100 WBCS
PLATELET # BLD AUTO: 633 THOUSANDS/UL (ref 149–390)
PLATELET BLD QL SMEAR: ABNORMAL
PMV BLD AUTO: 8.7 FL (ref 8.9–12.7)
POIKILOCYTOSIS BLD QL SMEAR: PRESENT
RBC # BLD AUTO: 4.92 MILLION/UL (ref 3.81–5.12)
RBC MORPH BLD: PRESENT
RETICS # AUTO: NORMAL 10*3/UL (ref 14097–95744)
RETICS # CALC: 1.24 % (ref 0.37–1.87)
TIBC SERPL-MCNC: 389 UG/DL (ref 250–450)
UIBC SERPL-MCNC: 377 UG/DL (ref 155–355)
WBC # BLD AUTO: 7.83 THOUSAND/UL (ref 4.31–10.16)

## 2023-10-13 PROCEDURE — 83550 IRON BINDING TEST: CPT

## 2023-10-13 PROCEDURE — 83540 ASSAY OF IRON: CPT

## 2023-10-13 PROCEDURE — 85025 COMPLETE CBC W/AUTO DIFF WBC: CPT

## 2023-10-13 PROCEDURE — 82728 ASSAY OF FERRITIN: CPT

## 2023-10-13 PROCEDURE — 36415 COLL VENOUS BLD VENIPUNCTURE: CPT

## 2023-10-13 PROCEDURE — 85045 AUTOMATED RETICULOCYTE COUNT: CPT

## 2023-10-13 RX ORDER — SODIUM CHLORIDE 9 MG/ML
20 INJECTION, SOLUTION INTRAVENOUS ONCE
OUTPATIENT
Start: 2023-10-13

## 2023-10-13 NOTE — TELEPHONE ENCOUNTER
What would be a preferred day of the week that would work best for your infusion appointment? Any day  Do you prefer mornings or afternoons for your appointments? In the AM no earlier than 930  Are there any days or dates that do not work for your schedule, including any upcoming vacations? N/a  We are going to try our best to schedule you at the infusion center closest to your home. In the event that we are unable to what would be your next preferred infusion site or sites? [facility] - no second site choice    Do you have transportation to take you to all of your appointments?  yes

## 2023-10-13 NOTE — PATIENT INSTRUCTIONS
Iron Rich Diet   AMBULATORY CARE:   An iron-rich diet  includes foods that are good sources of iron. People need extra iron during childhood, adolescence (teenage years), and pregnancy. Iron is a mineral that your body needs to make hemoglobin. Hemoglobin is part of your blood and helps carry oxygen from your lungs to the rest of your body. Eat iron-rich foods and vitamin C every day to prevent iron deficiency anemia. Iron deficiency anemia can lead to other health problems in adults and growth or development problems in children. Daily iron needs:   Males:      3to 1years old: 7 mg    3to 6years old: 10 mg    5to 15years old: 8 mg    15to 25years old: 11 mg    19 years and older: 8 mg    Females:      3to 1years old: 7 mg    3to 6years old: 10 mg    5to 15years old: 8 mg    15to 25years old: 15 mg    19 to 50 years: 18 mg    Over 46years old: 8 mg    Pregnant women:  27 mg    Foods that contain iron:   Meat, fish, and poultry are good sources of iron. They contain heme iron, a form of iron that your body absorbs very well. Fruit, vegetables, eggs, and grains such as pasta, rice, and cereal also contain iron. They contain nonheme iron, a form of iron that is not absorbed as well as heme iron. You can absorb more iron from these foods by eating a food that is high in vitamin C at the same time. You can also absorb more nonheme iron by eating a food from the meat, fish, and poultry group at the same time. Fish and shellfish contain some mercury, a metal that can be harmful to the body. Children and unborn babies are at higher risk for harm caused by mercury. Children and pregnant women should avoid eating fish high in mercury, such as shark and swordfish. They should also eat only fish that are lower in mercury, such as salmon, canned light tuna, and catfish. Limit the amount of low-mercury fish and shellfish you eat to less than 12 ounces per week.     Iron-rich foods:   Foods that contain 2 mg or more per serving:      3 ounces of cooked beef (sam, eye of round) or cooked turkey (dark meat)    ½ cup of beans (black, kidney, or lentil, or soybeans)    ½ cup of tofu    1 medium baked potato    1 cup of cooked artichoke or cooked spinach    ¾ cup of instant oatmeal    1 cup of corn flakes    Foods that contain 1 to 2 mg per serving:      3 ounces of chicken    3 ounces of pork    3 ounces of turkey (light meat)    3 ounces of light tuna    ½ cup of seedless, packed raisins    1 slice of whole-wheat or white bread       Good sources of vitamin C:  Eat a serving of vitamin C with any iron-rich food to help your body absorb more iron. The following fruits and vegetables are good sources of vitamin C:  1 cup of fresh orange juice (124 mg) or pink grapefruit juice (83 mg)    1 cup of strawberries (106 mg)    1 cup of diced cantaloupe (68 mg)    1 cup of sweet yellow pepper (283 mg)    1 cup of fresh, boiled broccoli (116 mg) or cooked brussels sprouts (97 mg)    1 cup of kale (53 mg)    1 cup of tomato juice (45 mg)       Other guidelines to follow:   Tea and coffee can decrease the amount of iron that your body absorbs from iron-rich foods. Drink coffee and tea separately from meals that contain iron-rich foods. Have foods and liquids high in calcium separately from iron-rich foods. Calcium prevents iron from being absorbed. Cow's milk and products made from it, such as cheese and yogurt, are high in calcium. Children older than 1 year only need about 24 ounces of cow's milk each day. Other foods high in calcium include leafy greens, green vegetables, almonds, and canned sardines. © Copyright [employer] 2023 Information is for End User's use only and may not be sold, redistributed or otherwise used for commercial purposes. The above information is an  only. It is not intended as medical advice for individual conditions or treatments.  Talk to your doctor, nurse or pharmacist before following any medical regimen to see if it is safe and effective for you.

## 2023-10-27 ENCOUNTER — TELEPHONE (OUTPATIENT)
Dept: GASTROENTEROLOGY | Facility: CLINIC | Age: 43
End: 2023-10-27

## 2023-10-27 NOTE — TELEPHONE ENCOUNTER
Patients GI provider:  Dr. Harper Friends    Number to return call: [phone]    Reason for call: Pt calling for biopsy results.     Scheduled procedure/appointment date if applicable: Apt 21/50

## 2023-11-03 DIAGNOSIS — D50.0 IRON DEFICIENCY ANEMIA DUE TO CHRONIC BLOOD LOSS: Primary | ICD-10-CM

## 2023-11-03 RX ORDER — SODIUM CHLORIDE 9 MG/ML
20 INJECTION, SOLUTION INTRAVENOUS ONCE
OUTPATIENT
Start: 2023-11-07

## 2023-11-03 NOTE — TELEPHONE ENCOUNTER
I spoke with patient and reviewed result note, per patient request she was scheduled for earlier follow up.

## 2023-11-07 ENCOUNTER — TELEPHONE (OUTPATIENT)
Dept: HEMATOLOGY ONCOLOGY | Facility: CLINIC | Age: 43
End: 2023-11-07

## 2023-11-07 ENCOUNTER — HOSPITAL ENCOUNTER (OUTPATIENT)
Dept: INFUSION CENTER | Facility: CLINIC | Age: 43
Discharge: HOME | End: 2023-11-07

## 2023-11-07 VITALS
SYSTOLIC BLOOD PRESSURE: 151 MMHG | HEART RATE: 99 BPM | RESPIRATION RATE: 18 BRPM | TEMPERATURE: 97.1 F | OXYGEN SATURATION: 100 % | DIASTOLIC BLOOD PRESSURE: 92 MMHG

## 2023-11-07 DIAGNOSIS — D50.0 IRON DEFICIENCY ANEMIA DUE TO CHRONIC BLOOD LOSS: Primary | ICD-10-CM

## 2023-11-07 PROCEDURE — 96365 THER/PROPH/DIAG IV INF INIT: CPT

## 2023-11-07 RX ORDER — SODIUM CHLORIDE 9 MG/ML
20 INJECTION, SOLUTION INTRAVENOUS ONCE
Status: COMPLETED | OUTPATIENT
Start: 2023-11-07 | End: 2023-11-07

## 2023-11-07 RX ORDER — SODIUM CHLORIDE 9 MG/ML
20 INJECTION, SOLUTION INTRAVENOUS ONCE
OUTPATIENT
Start: 2023-11-14

## 2023-11-07 RX ADMIN — IRON SUCROSE 200 MG: 20 INJECTION, SOLUTION INTRAVENOUS at 10:33

## 2023-11-07 RX ADMIN — SODIUM CHLORIDE 20 ML/HR: 0.9 INJECTION, SOLUTION INTRAVENOUS at 10:29

## 2023-11-07 NOTE — PROGRESS NOTES
Pt tolerated first venofer treatment well. Orientation to the unit given as well as all questions answerd. Aware of next appt. AVS declined.

## 2023-11-07 NOTE — TELEPHONE ENCOUNTER
Call Transfer   Who are you speaking with? Patient   If it is not the patient, are they listed on an active communication consent form? N/A   Who is the patients HemOnc/SurgOnc provider? N/a   What is the reason for this call? She asked if her grandson could go with her to infusion   Person/Department that the call was transferred to? Time that call was transferred?    [facility]   Your call will be transferred now. If you receive a voicemail, please leave a detailed message and a member of the team will return your call as soon as possible. Did you relay this information to the caller?   N/A

## 2023-11-09 ENCOUNTER — OFFICE VISIT (OUTPATIENT)
Dept: GASTROENTEROLOGY | Facility: CLINIC | Age: 43
End: 2023-11-09
Payer: COMMERCIAL

## 2023-11-09 VITALS
HEIGHT: 67 IN | TEMPERATURE: 99.1 F | DIASTOLIC BLOOD PRESSURE: 86 MMHG | WEIGHT: 228 LBS | BODY MASS INDEX: 35.79 KG/M2 | SYSTOLIC BLOOD PRESSURE: 130 MMHG

## 2023-11-09 DIAGNOSIS — N92.1 MENORRHAGIA WITH IRREGULAR CYCLE: ICD-10-CM

## 2023-11-09 DIAGNOSIS — D50.9 IRON DEFICIENCY ANEMIA, UNSPECIFIED IRON DEFICIENCY ANEMIA TYPE: Primary | ICD-10-CM

## 2023-11-09 PROCEDURE — 99214 OFFICE O/P EST MOD 30 MIN: CPT | Performed by: PHYSICIAN ASSISTANT

## 2023-11-09 NOTE — PROGRESS NOTES
[facility] - Outpatient Follow-up Note  Jacinta Babcock 39 y.o. female MRN: [id number]  Encounter: [id number]          ASSESSMENT AND PLAN:      Jamel Martinez is a 44\3 y/o female who presents for consultation for odynophagia. 1. GERD  2. Iron deficiency anemia   3. Menorrhagia     EGD/Colonoscopy noted 2 cm hiatal hernia and 5 colon polyps; TA on pathology. Stomach bx negative for H.pylori and duodenal bx negative for celiac. Hgb 8.9 as of 10/13. Pt is following with hematology for this, who referred her to OB/GYN due to menorrhagia, which she says has recently changed. Pt says she has not had any heartburn or dysphagia recently and is no longer on PPI. -capsule study ordered  -repeat CBC and iron panel   -capsule study ordered to rule out any SB etiology  -follow-up with hematology: explained if capsule study is normal but her anemia continues, further work-up would be per hematology and OB/GYN    ______________________________________________________________________    SUBJECTIVE:  Pt says she has been doing well. She says she has not had any issues with swallowing or heartburn, and has not needed to use the omeprazole. Pt denies abdominal pain, n/v, constipation, diarrhea, unintentional weight loss, fevers, chills, bloody or black BM. Pt says she does have a hx of heavy periods, though this past month it has not been as heavy. She would still like to discuss with OB/GYN. REVIEW OF SYSTEMS IS OTHERWISE NEGATIVE. Historical Information   Past Medical History:   Diagnosis Date    Abnormal Pap smear of cervix     Fibroid     Heart murmur     Iron deficiency anemia     due to heavy menstrual bleeding.  needed iron infusions    Migraine      Past Surgical History:   Procedure Laterality Date     SECTION      CHOLECYSTECTOMY      COLPOSCOPY       Social History   Social History     Substance and Sexual Activity   Alcohol Use Yes    Comment: rare     Social History Substance and Sexual Activity   Drug Use Never     Social History     Tobacco Use   Smoking Status Never   Smokeless Tobacco Never     Family History   Problem Relation Age of Onset    Hypertension Mother     Stomach cancer Mother     Colon cancer Father     Uterine cancer Sister     No Known Problems Sister     No Known Problems Sister     No Known Problems Sister     Lung cancer Brother     No Known Problems Brother     No Known Problems Brother     No Known Problems Maternal Grandmother     No Known Problems Maternal Grandfather     No Known Problems Paternal Grandmother     No Known Problems Paternal Grandfather     Asthma Daughter     No Known Problems Son     No Known Problems Son        Meds/Allergies       Current Outpatient Medications:     cyclobenzaprine (FLEXERIL) 5 mg tablet    ferrous sulfate 324 (65 Fe) mg    naproxen (EC NAPROSYN) 500 MG EC tablet    naproxen (Naprosyn) 500 mg tablet    omeprazole (PriLOSEC) 20 mg delayed release capsule    ondansetron (Zofran ODT) 4 mg disintegrating tablet    polyethylene glycol (GOLYTELY) 4000 mL solution    rizatriptan (MAXALT-MLT) 10 MG disintegrating tablet    No Known Allergies        Objective     not currently breastfeeding. There is no height or weight on file to calculate BMI. PHYSICAL EXAM:      General Appearance:   Alert, cooperative, no distress   HEENT:   Normocephalic, atraumatic, anicteric. Neck:  Supple, symmetrical, trachea midline   Lungs:   Clear to auscultation bilaterally; no rales, rhonchi or wheezing; respirations unlabored    Heart[de-identified]   Regular rate and rhythm; no murmur, rub, or gallop.    Abdomen:   Soft, non-tender, non-distended; normal bowel sounds; no masses, no organomegaly    Genitalia:   Deferred    Rectal:   Deferred    Extremities:  No cyanosis, clubbing or edema    Pulses:  2+ and symmetric    Skin:  No jaundice, rashes, or lesions    Lymph nodes:  No palpable cervical lymphadenopathy        Lab Results:   No visits with results within 1 Day(s) from this visit. Latest known visit with results is:   Appointment on 10/13/2023   Component Date Value    WBC 10/13/2023 7.83     RBC 10/13/2023 4.92     Hemoglobin 10/13/2023 8.9 (L)     Hematocrit 10/13/2023 33.5 (L)     MCV 10/13/2023 68 (L)     MCH 10/13/2023 18.1 (L)     MCHC 10/13/2023 26.6 (L)     RDW 10/13/2023 22.5 (H)     MPV 10/13/2023 8.7 (L)     Platelets 37/02/3279 633 (H)     nRBC 10/13/2023 0     Neutrophils Relative 10/13/2023 64     Immat GRANS % 10/13/2023 0     Lymphocytes Relative 10/13/2023 25     Monocytes Relative 10/13/2023 7     Eosinophils Relative 10/13/2023 3     Basophils Relative 10/13/2023 1     Neutrophils Absolute 10/13/2023 5.01     Immature Grans Absolute 10/13/2023 0.03     Lymphocytes Absolute 10/13/2023 1.99     Monocytes Absolute 10/13/2023 0.53     Eosinophils Absolute 10/13/2023 0.21     Basophils Absolute 10/13/2023 0.06     Retic Ct Abs 10/13/2023 61,000     Retic Ct Pct 10/13/2023 1.24     Iron Saturation 10/13/2023 3 (L)     TIBC 10/13/2023 389     Iron 10/13/2023 12 (L)     UIBC 10/13/2023 377 (H)     Ferritin 10/13/2023 3 (L)     RBC Morphology 10/13/2023 Present     Platelet Estimate 91/13/2905 Increased (A)     Anisocytosis 10/13/2023 Present     Hypochromia 10/13/2023 Present     Poikilocytes 10/13/2023 Present          Radiology Results:   Colonoscopy    Addendum Date: 10/16/2023 Addendum:   [address] [phone] DATE OF SERVICE: 10/12/23 PHYSICIAN(S): Attending: Dell Givens MD Fellow: No Staff Documented INDICATION: Iron deficiency anemia, unspecified iron deficiency anemia type POST-OP DIAGNOSIS: See the impression below. HISTORY: Prior colonoscopy: No prior colonoscopy. BOWEL PREPARATION: Miralax/Dulcolax PREPROCEDURE: Informed consent was obtained for the procedure, including sedation.  Risks including but not limited to bleeding, infection, perforation, adverse drug reaction and aspiration were explained in detail. Also explained about less than 100% sensitivity with the exam and other alternatives. The patient was placed in the left lateral decubitus position. Procedure: Colonoscopy DETAILS OF PROCEDURE: Patient was taken to the procedure room where a time out was performed to confirm correct patient and correct procedure. The patient underwent monitored anesthesia care, which was administered by an anesthesia professional. The patient's blood pressure, heart rate, level of consciousness, oxygen, respirations and ECG were monitored throughout the procedure. A digital rectal exam was performed. The scope was introduced through the anus and advanced to the terminal ileum. Retroflexion was performed in the rectum. The quality of bowel preparation was evaluated using the [facility] Bowel Preparation Scale with scores of: right colon = 2, transverse colon = 2, left colon = 2. The total BBPS score was 6. Bowel prep was adequate. The patient experienced no blood loss. The procedure was not difficult. The patient tolerated the procedure well. There were no apparent adverse events. ANESTHESIA INFORMATION: ASA: II Anesthesia Type: IV Sedation with Anesthesia MEDICATIONS: No administrations occurring from 0947 to 1046 on 10/12/23 FINDINGS: One 15 mm pedunculated polyp in the cecum; initial plan for hot snare but completely removed en bloc by cold snare due to snare not being attached to cautery.  Specimen was retrieved and placed 3 clips successfully (clips are MRI compatible); hemostasis achieved One pedunculated polyp measuring smaller than 5 mm in the cecum; completely removed en bloc by hot snare and retrieved specimen One sessile polyp in the ascending colon; completely removed en bloc by cold snare and retrieved specimen Two sessile polyps in the sigmoid colon; completely removed en bloc by cold snare and retrieved specimen EVENTS: Procedure Events Event Event Time ENDO CECUM REACHED 10/12/2023 10:10 AM ENDO SCOPE OUT TIME 10/12/2023 10:42 AM SPECIMENS: ID Type Source Tests Collected by Time Destination 1 : bx duodenum r/o celiac Tissue Duodenum TISSUE EXAM Sudhir Yanez MD 10/12/2023  9:59 AM  2 : bx gastric r/o h pylori Tissue Stomach TISSUE Makenna Mendez MD 10/12/2023 10:01 AM  3 : cecum polyp x 2   1 cold snare  1 hot snare Tissue Colon TISSUE EXAM Sudhir Yanez MD 10/12/2023 10:13 AM  4 : ascending polyp cold snre Tissue Colon TISSUE EXAM Sudhir Yanez MD 10/12/2023 10:25 AM  5 : sigmoid polyp  x 2 cold snre Tissue Colon TISSUE EXAM Sudhir Yanez MD 10/12/2023 10:35 AM  EQUIPMENT: Colonoscope -[id number] IMPRESSION: One 15 mm pedunculated polyp in the cecum; initial plan for hot snare but completely removed en bloc by cold snare due to snare not being attached to cautery. Specimen was retrieved and placed 3 clips successfully (clips are MRI compatible); hemostasis achieved One pedunculated polyp measuring smaller than 5 mm in the cecum; completely removed en bloc by hot snare and retrieved specimen One sessile polyp in the ascending colon; completely removed en bloc by cold snare and retrieved specimen Two sessile polyps in the sigmoid colon; completely removed en bloc by cold snare and retrieved specimen RECOMMENDATION:  Repeat colonoscopy in 3 years  Personal history of colon polyps   Sudhir Yanez MD     Result Date: 10/16/2023  Narrative: Table formatting from the original result was not included. [address] [place] [address] [phone] DATE OF SERVICE: 10/12/23 PHYSICIAN(S): Attending: Sudhir Yanez MD Fellow: No Staff Documented INDICATION: Iron deficiency anemia, unspecified iron deficiency anemia type POST-OP DIAGNOSIS: See the impression below. HISTORY: Prior colonoscopy: No prior colonoscopy. BOWEL PREPARATION: Miralax/Dulcolax PREPROCEDURE: Informed consent was obtained for the procedure, including sedation.  Risks including but not limited to bleeding, infection, perforation, adverse drug reaction and aspiration were explained in detail. Also explained about less than 100% sensitivity with the exam and other alternatives. The patient was placed in the left lateral decubitus position. Procedure: Colonoscopy DETAILS OF PROCEDURE: Patient was taken to the procedure room where a time out was performed to confirm correct patient and correct procedure. The patient underwent monitored anesthesia care, which was administered by an anesthesia professional. The patient's blood pressure, heart rate, level of consciousness, oxygen, respirations and ECG were monitored throughout the procedure. A digital rectal exam was performed. The scope was introduced through the anus and advanced to the terminal ileum. Retroflexion was performed in the rectum. The quality of bowel preparation was evaluated using the [facility] Bowel Preparation Scale with scores of: right colon = 2, transverse colon = 2, left colon = 2. The total BBPS score was 6. Bowel prep was adequate. The patient experienced no blood loss. The procedure was not difficult. The patient tolerated the procedure well. There were no apparent adverse events. ANESTHESIA INFORMATION: ASA: II Anesthesia Type: IV Sedation with Anesthesia MEDICATIONS: No administrations occurring from 0947 to 1046 on 10/12/23 FINDINGS: One 15 mm pedunculated polyp in the cecum; initial plan for hot snare but completely removed en bloc by cold snare due to snare not being attached to cautery.  Specimen was retrieved and placed 3 clips successfully (clips are MRI compatible); hemostasis achieved One pedunculated polyp measuring smaller than 5 mm in the cecum; completely removed en bloc by hot snare and retrieved specimen One sessile polyp in the ascending colon; completely removed en bloc by cold snare and retrieved specimen Two sessile polyps in the sigmoid colon; completely removed en bloc by cold snare and retrieved specimen EVENTS: Procedure Events Event Event Time ENDO CECUM REACHED 10/12/2023 10:10 AM ENDO SCOPE OUT TIME 10/12/2023 10:42 AM SPECIMENS: ID Type Source Tests Collected by Time Destination 1 : bx duodenum r/o celiac Tissue Duodenum TISSUE EXAM Rafael Bales MD 10/12/2023  9:59 AM  2 : bx gastric r/o h pylori Tissue Stomach TISSUE Km Nuñez MD 10/12/2023 10:01 AM  3 : cecum polyp x 2   1 cold snare  1 hot snare Tissue Colon TISSUE EXAM Rafael Bales MD 10/12/2023 10:13 AM  4 : ascending polyp cold snre Tissue Colon TISSUE EXAM Rafael Bales MD 10/12/2023 10:25 AM  5 : sigmoid polyp  x 2 cold snre Tissue Colon TISSUE EXAM Rafael Bales MD 10/12/2023 10:35 AM  EQUIPMENT: Colonoscope -[id number]     Impression: One 15 mm pedunculated polyp in the cecum; initial plan for hot snare but completely removed en bloc by cold snare due to snare not being attached to cautery. Specimen was retrieved and placed 3 clips successfully (clips are MRI compatible); hemostasis achieved One pedunculated polyp measuring smaller than 5 mm in the cecum; completely removed en bloc by hot snare and retrieved specimen One sessile polyp in the ascending colon; completely removed en bloc by cold snare and retrieved specimen Two sessile polyps in the sigmoid colon; completely removed en bloc by cold snare and retrieved specimen RECOMMENDATION:  Repeat colonoscopy in 1 year  Personal history of colon polyps   Rafael Bales MD     EGD    Result Date: 10/12/2023  Narrative: Table formatting from the original result was not included. [address] [place] [address] Endoscopy [address] [place] [address] [phone] DATE OF SERVICE: 10/12/23 PHYSICIAN(S): Attending: Rafael Bales MD Fellow: No Staff Documented INDICATION: Odynophagia POST-OP DIAGNOSIS: See the impression below. PREPROCEDURE: Informed consent was obtained for the procedure, including sedation.   Risks of perforation, hemorrhage, adverse drug reaction and aspiration were discussed. The patient was placed in the left lateral decubitus position. Patient was explained about the risks and benefits of the procedure. Risks including but not limited to bleeding, infection, and perforation were explained in detail. Also explained about less than 100% sensitivity with the exam and other alternatives. PROCEDURE: EGD DETAILS OF PROCEDURE: Patient was taken to the procedure room where a time out was performed to confirm correct patient and correct procedure. The patient underwent monitored anesthesia care, which was administered by an anesthesia professional. The patient's blood pressure, heart rate, level of consciousness, respirations and oxygen were monitored throughout the procedure. The scope was advanced to the second part of the duodenum. Retroflexion was performed in the fundus. The patient experienced no blood loss. The procedure was not difficult. The patient tolerated the procedure well. There were no apparent adverse events.  ANESTHESIA INFORMATION: ASA: II Anesthesia Type: IV Sedation with Anesthesia MEDICATIONS: No administrations occurring from 0947 to 1003 on 10/12/23 FINDINGS: Regular Z-line 38 cm from the incisors 2 cm hiatal hernia - GE junction 38 cm from the incisors, diaphragmatic impression 40 cm from the incisors:  Hill classification: Grade IV The stomach and duodenum appeared normal. Performed forceps biopsies in the body of the stomach and antrum to rule out H. pylori Performed forceps biopsies in the duodenal bulb and 2nd part of the duodenum to rule out celiac disease SPECIMENS: ID Type Source Tests Collected by Time Destination 1 : bx duodenum r/o celiac Tissue Duodenum TISSUE EXAM Tao Benedict MD 10/12/2023  9:59 AM  2 : bx gastric r/o h pylori Tissue Stomach TISSUE EXAM Tao Benedict MD 10/12/2023 10:01 AM      Impression: 2 cm hiatal hernia The stomach and duodenum appeared normal. Performed forceps biopsies in the body of the stomach and antrum to rule out H. pylori Performed forceps biopsies in the duodenal bulb and 2nd part of the duodenum to rule out celiac disease RECOMMENDATION:  Await pathology results Proceed with colonoscopy.    Follow up in GI clinic   Rosalea Lesch, MD

## 2023-11-13 ENCOUNTER — TELEPHONE (OUTPATIENT)
Dept: SURGICAL ONCOLOGY | Facility: CLINIC | Age: 43
End: 2023-11-13

## 2023-11-13 NOTE — TELEPHONE ENCOUNTER
Patient came into the office to let us know that she will have to cancel her infusion treatments due to her insurance being canceled and her new insurance doesn't start until January. Let patient know that provider would be informed.  Will call infusion to cancel infusion treatments for patient

## 2023-11-14 ENCOUNTER — TELEPHONE (OUTPATIENT)
Dept: INFUSION CENTER | Facility: CLINIC | Age: 43
End: 2023-11-14

## 2023-11-14 ENCOUNTER — HOSPITAL ENCOUNTER (OUTPATIENT)
Dept: INFUSION CENTER | Facility: CLINIC | Age: 43
Discharge: HOME | End: 2023-11-14

## 2023-11-14 NOTE — TELEPHONE ENCOUNTER
Patient called to get an update on if the rest of her Venofer appointments was cancelled. Patient stated she wanted to cancel appointments due to not having the same insurance that is on file. Patient stated she has new insurance information but does not have the physical card in hand as of yet she is still waiting for it to come in. I instructed patient to give office a call in the morning so that we can check if we can get new insurance information put into the system. First appointment cancelled and the remainder left on schedule pending new insurance.

## 2023-11-15 ENCOUNTER — TELEPHONE (OUTPATIENT)
Age: 43
End: 2023-11-15

## 2023-11-15 NOTE — TELEPHONE ENCOUNTER
She can hold off on her capsule study. However as her iron levels and Hgb are still quite low, I suggest the pt discusses with her hematology team what she should do regarding iron infusions  VS PO supplementation ASAP. Thanks!

## 2023-11-15 NOTE — TELEPHONE ENCOUNTER
Patients GI provider: Melissa Alaniz PA-C     Number to return call: ( [phone]     Reason for call: Pt calling she currently does not have Insurance she will have Insurance as of the 1st of January she wants to know if it is ok to put the Infusions and appt and procedure off until then as this is expensive for her at this time for out of pocket      Scheduled procedure/appointment date if applicable: Apt/procedure 11/15 GI Capsule

## 2023-11-24 ENCOUNTER — TELEPHONE (OUTPATIENT)
Dept: HEMATOLOGY ONCOLOGY | Facility: CLINIC | Age: 43
End: 2023-11-24

## 2023-11-24 NOTE — TELEPHONE ENCOUNTER
Received notice from infusion staff that patient is requesting to cancel iron infusions due to insurance changes. Attempted to phone patient. Left voice message with direct call back number.

## 2023-11-27 ENCOUNTER — HOSPITAL ENCOUNTER (OUTPATIENT)
Dept: INFUSION CENTER | Facility: CLINIC | Age: 43
Discharge: HOME | End: 2023-11-27
Payer: COMMERCIAL

## 2023-11-27 VITALS
HEART RATE: 95 BPM | RESPIRATION RATE: 18 BRPM | TEMPERATURE: 97.7 F | SYSTOLIC BLOOD PRESSURE: 138 MMHG | DIASTOLIC BLOOD PRESSURE: 86 MMHG | OXYGEN SATURATION: 94 %

## 2023-11-27 DIAGNOSIS — D50.0 IRON DEFICIENCY ANEMIA DUE TO CHRONIC BLOOD LOSS: Primary | ICD-10-CM

## 2023-11-27 PROCEDURE — 96365 THER/PROPH/DIAG IV INF INIT: CPT

## 2023-11-27 RX ORDER — SODIUM CHLORIDE 9 MG/ML
20 INJECTION, SOLUTION INTRAVENOUS ONCE
Status: CANCELLED | OUTPATIENT
Start: 2023-11-27

## 2023-11-27 RX ORDER — SODIUM CHLORIDE 9 MG/ML
20 INJECTION, SOLUTION INTRAVENOUS ONCE
Status: COMPLETED | OUTPATIENT
Start: 2023-11-27 | End: 2023-11-27

## 2023-11-27 RX ORDER — SODIUM CHLORIDE 9 MG/ML
20 INJECTION, SOLUTION INTRAVENOUS ONCE
Status: CANCELLED | OUTPATIENT
Start: 2023-12-05

## 2023-11-27 RX ADMIN — SODIUM CHLORIDE 20 ML/HR: 0.9 INJECTION, SOLUTION INTRAVENOUS at 16:28

## 2023-11-27 RX ADMIN — IRON SUCROSE 200 MG: 20 INJECTION, SOLUTION INTRAVENOUS at 16:28

## 2023-11-27 NOTE — PROGRESS NOTES
Patient tolerated treatment without incident Peripheral IV removed. Next appointment confirmed. AVS offered and declined.

## 2023-11-27 NOTE — PLAN OF CARE
Problem: Knowledge Deficit  Goal: Patient/family/caregiver demonstrates understanding of disease process, treatment plan, medications, and discharge instructions  Description: Complete learning assessment and assess knowledge base.   Interventions:  - Provide teaching at level of understanding  - Provide teaching via preferred learning methods  11/27/2023 1618 by Isai Garcia RN  Outcome: Progressing  11/27/2023 1618 by Isai Garcia RN  Outcome: Progressing

## 2023-11-27 NOTE — TELEPHONE ENCOUNTER
Phoned patient. Confirmed insurance is now active and patient will be at infusion today as scheduled.

## 2023-12-05 ENCOUNTER — HOSPITAL ENCOUNTER (OUTPATIENT)
Dept: INFUSION CENTER | Facility: CLINIC | Age: 43
Discharge: HOME | End: 2023-12-05
Payer: COMMERCIAL

## 2023-12-05 VITALS
TEMPERATURE: 97.1 F | HEART RATE: 100 BPM | OXYGEN SATURATION: 100 % | SYSTOLIC BLOOD PRESSURE: 145 MMHG | DIASTOLIC BLOOD PRESSURE: 80 MMHG

## 2023-12-05 DIAGNOSIS — D50.0 IRON DEFICIENCY ANEMIA DUE TO CHRONIC BLOOD LOSS: Primary | ICD-10-CM

## 2023-12-05 PROCEDURE — 96365 THER/PROPH/DIAG IV INF INIT: CPT

## 2023-12-05 RX ORDER — SODIUM CHLORIDE 9 MG/ML
20 INJECTION, SOLUTION INTRAVENOUS ONCE
Status: CANCELLED | OUTPATIENT
Start: 2023-12-12

## 2023-12-05 RX ORDER — SODIUM CHLORIDE 9 MG/ML
20 INJECTION, SOLUTION INTRAVENOUS ONCE
Status: COMPLETED | OUTPATIENT
Start: 2023-12-05 | End: 2023-12-05

## 2023-12-05 RX ADMIN — SODIUM CHLORIDE 20 ML/HR: 0.9 INJECTION, SOLUTION INTRAVENOUS at 12:31

## 2023-12-05 RX ADMIN — IRON SUCROSE 200 MG: 20 INJECTION, SOLUTION INTRAVENOUS at 12:28

## 2023-12-05 NOTE — PROGRESS NOTES
Pt tolerated venofer infusion, offers no complaints or issues, pt aware of her next appointment, declined AVS

## 2023-12-08 ENCOUNTER — APPOINTMENT (OUTPATIENT)
Dept: LAB | Facility: AMBULARY SURGERY CENTER | Age: 43
End: 2023-12-08
Payer: COMMERCIAL

## 2023-12-08 DIAGNOSIS — D50.9 IRON DEFICIENCY ANEMIA, UNSPECIFIED IRON DEFICIENCY ANEMIA TYPE: ICD-10-CM

## 2023-12-08 LAB
ALBUMIN SERPL BCP-MCNC: 3.8 G/DL (ref 3.5–5)
ALP SERPL-CCNC: 72 U/L (ref 34–104)
ALT SERPL W P-5'-P-CCNC: 9 U/L (ref 7–52)
ANION GAP SERPL CALCULATED.3IONS-SCNC: 10 MMOL/L
AST SERPL W P-5'-P-CCNC: 13 U/L (ref 13–39)
BASOPHILS # BLD AUTO: 0.08 THOUSANDS/ÂΜL (ref 0–0.1)
BASOPHILS NFR BLD AUTO: 1 % (ref 0–1)
BILIRUB SERPL-MCNC: 0.43 MG/DL (ref 0.2–1)
BUN SERPL-MCNC: 10 MG/DL (ref 5–25)
CALCIUM SERPL-MCNC: 10.6 MG/DL (ref 8.4–10.2)
CHLORIDE SERPL-SCNC: 103 MMOL/L (ref 96–108)
CO2 SERPL-SCNC: 28 MMOL/L (ref 21–32)
CREAT SERPL-MCNC: 0.72 MG/DL (ref 0.6–1.3)
EOSINOPHIL # BLD AUTO: 0.27 THOUSAND/ÂΜL (ref 0–0.61)
EOSINOPHIL NFR BLD AUTO: 3 % (ref 0–6)
ERYTHROCYTE [DISTWIDTH] IN BLOOD BY AUTOMATED COUNT: 26.5 % (ref 11.6–15.1)
FERRITIN SERPL-MCNC: 141 NG/ML (ref 11–307)
GFR SERPL CREATININE-BSD FRML MDRD: 102 ML/MIN/1.73SQ M
GLUCOSE P FAST SERPL-MCNC: 116 MG/DL (ref 65–99)
HCT VFR BLD AUTO: 36 % (ref 34.8–46.1)
HGB BLD-MCNC: 10.2 G/DL (ref 11.5–15.4)
IMM GRANULOCYTES # BLD AUTO: 0.05 THOUSAND/UL (ref 0–0.2)
IMM GRANULOCYTES NFR BLD AUTO: 1 % (ref 0–2)
IRON SATN MFR SERPL: 14 % (ref 15–50)
IRON SERPL-MCNC: 50 UG/DL (ref 50–212)
LYMPHOCYTES # BLD AUTO: 1.93 THOUSANDS/ÂΜL (ref 0.6–4.47)
LYMPHOCYTES NFR BLD AUTO: 22 % (ref 14–44)
MCH RBC QN AUTO: 21.3 PG (ref 26.8–34.3)
MCHC RBC AUTO-ENTMCNC: 28.3 G/DL (ref 31.4–37.4)
MCV RBC AUTO: 75 FL (ref 82–98)
MONOCYTES # BLD AUTO: 0.62 THOUSAND/ÂΜL (ref 0.17–1.22)
MONOCYTES NFR BLD AUTO: 7 % (ref 4–12)
NEUTROPHILS # BLD AUTO: 5.72 THOUSANDS/ÂΜL (ref 1.85–7.62)
NEUTS SEG NFR BLD AUTO: 66 % (ref 43–75)
NRBC BLD AUTO-RTO: 0 /100 WBCS
PLATELET # BLD AUTO: 490 THOUSANDS/UL (ref 149–390)
PMV BLD AUTO: 9.4 FL (ref 8.9–12.7)
POTASSIUM SERPL-SCNC: 3.2 MMOL/L (ref 3.5–5.3)
PROT SERPL-MCNC: 7.2 G/DL (ref 6.4–8.4)
RBC # BLD AUTO: 4.78 MILLION/UL (ref 3.81–5.12)
SODIUM SERPL-SCNC: 141 MMOL/L (ref 135–147)
TIBC SERPL-MCNC: 359 UG/DL (ref 250–450)
UIBC SERPL-MCNC: 309 UG/DL (ref 155–355)
WBC # BLD AUTO: 8.67 THOUSAND/UL (ref 4.31–10.16)

## 2023-12-08 PROCEDURE — 83550 IRON BINDING TEST: CPT

## 2023-12-08 PROCEDURE — 80053 COMPREHEN METABOLIC PANEL: CPT

## 2023-12-08 PROCEDURE — 83540 ASSAY OF IRON: CPT

## 2023-12-08 PROCEDURE — 85025 COMPLETE CBC W/AUTO DIFF WBC: CPT

## 2023-12-08 PROCEDURE — 82728 ASSAY OF FERRITIN: CPT

## 2023-12-08 PROCEDURE — 36415 COLL VENOUS BLD VENIPUNCTURE: CPT

## 2023-12-12 ENCOUNTER — OFFICE VISIT (OUTPATIENT)
Dept: FAMILY MEDICINE CLINIC | Facility: CLINIC | Age: 43
End: 2023-12-12

## 2023-12-12 ENCOUNTER — HOSPITAL ENCOUNTER (OUTPATIENT)
Dept: INFUSION CENTER | Facility: CLINIC | Age: 43
Discharge: HOME | End: 2023-12-12
Payer: COMMERCIAL

## 2023-12-12 VITALS
HEART RATE: 98 BPM | RESPIRATION RATE: 16 BRPM | DIASTOLIC BLOOD PRESSURE: 82 MMHG | OXYGEN SATURATION: 100 % | SYSTOLIC BLOOD PRESSURE: 128 MMHG | TEMPERATURE: 97.8 F

## 2023-12-12 VITALS
HEIGHT: 67 IN | RESPIRATION RATE: 18 BRPM | BODY MASS INDEX: 35.72 KG/M2 | HEART RATE: 81 BPM | SYSTOLIC BLOOD PRESSURE: 149 MMHG | WEIGHT: 227.6 LBS | TEMPERATURE: 98.3 F | OXYGEN SATURATION: 100 % | DIASTOLIC BLOOD PRESSURE: 95 MMHG

## 2023-12-12 DIAGNOSIS — R59.0 REACTIVE CERVICAL LYMPHADENOPATHY: Primary | ICD-10-CM

## 2023-12-12 DIAGNOSIS — D50.0 IRON DEFICIENCY ANEMIA DUE TO CHRONIC BLOOD LOSS: Primary | ICD-10-CM

## 2023-12-12 PROBLEM — Z71.89 ADVICE GIVEN ABOUT COVID-19 VIRUS INFECTION: Status: RESOLVED | Noted: 2021-11-23 | Resolved: 2023-12-12

## 2023-12-12 PROBLEM — R13.10 ODYNOPHAGIA: Status: RESOLVED | Noted: 2023-07-07 | Resolved: 2023-12-12

## 2023-12-12 PROBLEM — M25.512 LEFT ANTERIOR SHOULDER PAIN: Status: RESOLVED | Noted: 2022-04-28 | Resolved: 2023-12-12

## 2023-12-12 PROBLEM — R03.0 ELEVATED BP WITHOUT DIAGNOSIS OF HYPERTENSION: Status: RESOLVED | Noted: 2018-07-20 | Resolved: 2023-12-12

## 2023-12-12 PROBLEM — R11.0 NAUSEA: Status: RESOLVED | Noted: 2022-06-09 | Resolved: 2023-12-12

## 2023-12-12 PROCEDURE — 96365 THER/PROPH/DIAG IV INF INIT: CPT

## 2023-12-12 PROCEDURE — 99213 OFFICE O/P EST LOW 20 MIN: CPT | Performed by: FAMILY MEDICINE

## 2023-12-12 RX ORDER — SODIUM CHLORIDE 9 MG/ML
20 INJECTION, SOLUTION INTRAVENOUS ONCE
Status: COMPLETED | OUTPATIENT
Start: 2023-12-12 | End: 2023-12-12

## 2023-12-12 RX ORDER — SODIUM CHLORIDE 9 MG/ML
20 INJECTION, SOLUTION INTRAVENOUS ONCE
Status: CANCELLED | OUTPATIENT
Start: 2023-12-20

## 2023-12-12 RX ADMIN — IRON SUCROSE 200 MG: 20 INJECTION, SOLUTION INTRAVENOUS at 09:44

## 2023-12-12 RX ADMIN — SODIUM CHLORIDE 20 ML/HR: 0.9 INJECTION, SOLUTION INTRAVENOUS at 09:45

## 2023-12-12 NOTE — PROGRESS NOTES
Name: Madiha Munson      : 1980      MRN: [id number]  Encounter Provider: Glenis Dailey MD  Encounter Date: 2023   Encounter department: [address]     1. Reactive cervical lymphadenopathy  Comments:  Recovering from recent viral illness  New R-sided neck pain by cervical lymph node  Treat symptomatically, continue to monitor. Return if persistent           Subjective      Patient is presenting for new onset R-sided neck pain, radiating from the post-auricular region down the neck. She denies any trauma, exacerbations with head movement. It is tender with pressure or palpation. She has been treating symptomatically with Tylenol/Motrin and warm compresses, which seem to provide some relief. She is recovering from a recent viral illness, and denies any fevers or worsening symptoms. Reports adequate PO intake. Review of Systems   Constitutional:  Negative for chills and fever. HENT:  Positive for congestion. Negative for ear pain and sore throat. Eyes:  Negative for pain and visual disturbance. Respiratory:  Positive for cough. Negative for shortness of breath. Cardiovascular:  Negative for chest pain and palpitations. Gastrointestinal:  Negative for abdominal pain and vomiting. Genitourinary:  Negative for dysuria and hematuria. Musculoskeletal:  Positive for neck pain. Negative for arthralgias, back pain and neck stiffness. Skin:  Negative for color change and rash. All other systems reviewed and are negative.           Current Outpatient Medications on File Prior to Visit   Medication Sig    cyclobenzaprine (FLEXERIL) 5 mg tablet Take 1 tablet (5 mg total) by mouth daily at bedtime (Patient not taking: Reported on 2023)    ferrous sulfate 324 (65 Fe) mg take 1 tablet by mouth twice a day (Patient not taking: Reported on 2023)    naproxen (EC NAPROSYN) 500 MG EC tablet Take 1 tablet (500 mg total) by mouth 2 (two) times a day with meals (Patient not taking: Reported on 11/9/2023)    naproxen (Naprosyn) 500 mg tablet Take 1 tablet (500 mg total) by mouth 2 (two) times a day with meals (Patient not taking: Reported on 11/9/2023)    polyethylene glycol (GOLYTELY) 4000 mL solution Take 4,000 mL by mouth once for 1 dose Take as directed by office instructions prior to colonoscopy. rizatriptan (MAXALT-MLT) 10 MG disintegrating tablet Take 1 tablet (10 mg total) by mouth once as needed for migraine for up to 1 dose May repeat in 2 hours if needed (Patient not taking: Reported on 11/9/2023)       Objective     /95 (BP Location: Left arm, Patient Position: Sitting, Cuff Size: Large)   Pulse 81   Temp 98.3 °F (36.8 °C) (Temporal)   Resp 18   Ht 5' 7" (1.702 m)   Wt 103 kg (227 lb 9.6 oz)   SpO2 100%   BMI 35.65 kg/m²     Physical Exam  Vitals reviewed. Constitutional:       General: She is not in acute distress. Appearance: Normal appearance. She is not ill-appearing. HENT:      Head: Normocephalic and atraumatic. Right Ear: Tympanic membrane, ear canal and external ear normal.      Left Ear: Tympanic membrane, ear canal and external ear normal.      Nose: Nose normal.   Eyes:      Extraocular Movements: Extraocular movements intact. Conjunctiva/sclera: Conjunctivae normal.   Neck:      Comments: Tenderness of R cervical lymph node    Cardiovascular:      Rate and Rhythm: Normal rate and regular rhythm. Heart sounds: Normal heart sounds. No murmur heard. Pulmonary:      Effort: Pulmonary effort is normal. No respiratory distress. Breath sounds: Normal breath sounds. Abdominal:      General: Abdomen is flat. Palpations: Abdomen is soft. Tenderness: There is no abdominal tenderness. Musculoskeletal:         General: Normal range of motion. Cervical back: Normal range of motion. Tenderness present. Lymphadenopathy:      Cervical: Cervical adenopathy present. Neurological:      Mental Status: She is alert.    Psychiatric:         Mood and Affect: Mood normal.         Behavior: Behavior normal.       Ariela Freeman MD

## 2023-12-18 ENCOUNTER — OFFICE VISIT (OUTPATIENT)
Dept: FAMILY MEDICINE CLINIC | Facility: CLINIC | Age: 43
End: 2023-12-18

## 2023-12-18 VITALS
TEMPERATURE: 98.4 F | SYSTOLIC BLOOD PRESSURE: 133 MMHG | HEART RATE: 91 BPM | BODY MASS INDEX: 35.18 KG/M2 | DIASTOLIC BLOOD PRESSURE: 87 MMHG | RESPIRATION RATE: 18 BRPM | WEIGHT: 224.6 LBS

## 2023-12-18 DIAGNOSIS — Z23 NEED FOR INFLUENZA VACCINATION: ICD-10-CM

## 2023-12-18 DIAGNOSIS — H66.001 NON-RECURRENT ACUTE SUPPURATIVE OTITIS MEDIA OF RIGHT EAR WITHOUT SPONTANEOUS RUPTURE OF TYMPANIC MEMBRANE: Primary | ICD-10-CM

## 2023-12-18 PROCEDURE — 99213 OFFICE O/P EST LOW 20 MIN: CPT | Performed by: FAMILY MEDICINE

## 2023-12-18 PROCEDURE — 90471 IMMUNIZATION ADMIN: CPT | Performed by: FAMILY MEDICINE

## 2023-12-18 PROCEDURE — 90686 IIV4 VACC NO PRSV 0.5 ML IM: CPT | Performed by: FAMILY MEDICINE

## 2023-12-18 RX ORDER — AMOXICILLIN 500 MG/1
500 CAPSULE ORAL EVERY 8 HOURS SCHEDULED
Qty: 21 CAPSULE | Refills: 0 | Status: SHIPPED | OUTPATIENT
Start: 2023-12-18 | End: 2023-12-25

## 2023-12-19 NOTE — ASSESSMENT & PLAN NOTE
"- C/o 2 days of progressively worsening R sided \"clogged\" ear, decreased hearing   - Denies fevers, discharge, trauma to ear  - Recovering from URI  - VSS  - PE: R ear bulging and cloudy TM w erythema , L ear normal   Amoxicillin 500 mg Q8h x 7 days  "

## 2023-12-19 NOTE — PROGRESS NOTES
"Name: Rebeca Jacobsen      : 1980      MRN: [id number]  Encounter Provider: Dante Jimenez MD  Encounter Date: 2023   Encounter department: [facility]    Assessment & Plan     1. Non-recurrent acute suppurative otitis media of right ear without spontaneous rupture of tympanic membrane  Assessment & Plan:  - C/o 2 days of progressively worsening R sided \"clogged\" ear, decreased hearing   - Denies fevers, discharge, trauma to ear  - Recovering from URI  - VSS  - PE: R ear bulging and cloudy TM w erythema , L ear normal   Amoxicillin 500 mg Q8h x 7 days    Orders:  -     amoxicillin (AMOXIL) 500 mg capsule; Take 1 capsule (500 mg total) by mouth every 8 (eight) hours for 7 days    2. Need for influenza vaccination  Assessment & Plan:  - Yearly flu vaccine for work    Orders:  -     influenza vaccine, quadrivalent, 0.5 mL, preservative-free, for adult and pediatric patients 6 mos+ (AFLURIA, FLUARIX, FLULAVAL, FLUZONE)           Subjective      42 yo female with recent suspected viral URI c/o 2 days of progressively worsening R ear \"clogged\" feeling with decreased hearing. Denies fevers, drainage, trauma to area. Does not use Qtips inside ear canal.   Requests yearly flu for work.       Review of Systems   Constitutional:  Negative for chills and fever.   HENT:  Positive for hearing loss. Negative for ear discharge, ear pain and sore throat.    Eyes:  Negative for pain and visual disturbance.   Respiratory:  Negative for cough and shortness of breath.    Cardiovascular:  Negative for chest pain and palpitations.   Gastrointestinal:  Negative for abdominal pain and vomiting.   Genitourinary:  Negative for dysuria and hematuria.   Musculoskeletal:  Negative for arthralgias and back pain.   Skin:  Negative for color change and rash.   Neurological:  Negative for seizures and syncope.   All other systems reviewed and are negative.      Current Outpatient Medications " on File Prior to Visit   Medication Sig    cyclobenzaprine (FLEXERIL) 5 mg tablet Take 1 tablet (5 mg total) by mouth daily at bedtime (Patient not taking: Reported on 11/9/2023)    ferrous sulfate 324 (65 Fe) mg take 1 tablet by mouth twice a day (Patient not taking: Reported on 11/9/2023)    naproxen (EC NAPROSYN) 500 MG EC tablet Take 1 tablet (500 mg total) by mouth 2 (two) times a day with meals (Patient not taking: Reported on 11/9/2023)    naproxen (Naprosyn) 500 mg tablet Take 1 tablet (500 mg total) by mouth 2 (two) times a day with meals (Patient not taking: Reported on 11/9/2023)    polyethylene glycol (GOLYTELY) 4000 mL solution Take 4,000 mL by mouth once for 1 dose Take as directed by office instructions prior to colonoscopy.    rizatriptan (MAXALT-MLT) 10 MG disintegrating tablet Take 1 tablet (10 mg total) by mouth once as needed for migraine for up to 1 dose May repeat in 2 hours if needed (Patient not taking: Reported on 11/9/2023)       Objective     /87 (BP Location: Left arm, Patient Position: Sitting, Cuff Size: Large)   Pulse 91   Temp 98.4 °F (36.9 °C)   Resp 18   Wt 102 kg (224 lb 9.6 oz)   BMI 35.18 kg/m²     Physical Exam  Vitals reviewed.   Constitutional:       General: She is awake. She is not in acute distress.     Appearance: Normal appearance. She is not ill-appearing.   HENT:      Head: Normocephalic and atraumatic.      Right Ear: External ear normal. Decreased hearing noted. No drainage or tenderness. A middle ear effusion is present. Tympanic membrane is bulging. Tympanic membrane is not perforated.      Left Ear: Hearing, tympanic membrane, ear canal and external ear normal. No decreased hearing noted. No drainage or tenderness.  No middle ear effusion. Tympanic membrane is not perforated or bulging.      Nose: Nose normal.      Mouth/Throat:      Mouth: Mucous membranes are moist.      Tongue: Tongue does not deviate from midline.      Pharynx: Oropharynx is clear.    Eyes:      Extraocular Movements: Extraocular movements intact.   Cardiovascular:      Rate and Rhythm: Normal rate and regular rhythm.      Pulses: Normal pulses.      Heart sounds: Normal heart sounds. No murmur heard.  Pulmonary:      Effort: Pulmonary effort is normal. No respiratory distress.      Breath sounds: Normal breath sounds and air entry. No wheezing or rales.   Abdominal:      General: Bowel sounds are normal. There is no distension.      Palpations: Abdomen is soft. There is no mass.      Tenderness: There is no abdominal tenderness. There is no guarding or rebound.   Musculoskeletal:         General: No swelling, deformity or signs of injury. Normal range of motion.      Cervical back: Normal range of motion and neck supple.   Skin:     General: Skin is warm and dry.      Capillary Refill: Capillary refill takes less than 2 seconds.      Findings: No bruising or rash.   Neurological:      General: No focal deficit present.      Mental Status: She is alert and oriented to person, place, and time. Mental status is at baseline.   Psychiatric:         Mood and Affect: Mood normal.         Behavior: Behavior normal. Behavior is cooperative.         Thought Content: Thought content normal.       Dante Jimenez MD

## 2023-12-20 ENCOUNTER — HOSPITAL ENCOUNTER (OUTPATIENT)
Dept: INFUSION CENTER | Facility: CLINIC | Age: 43
Discharge: HOME | End: 2023-12-20
Payer: COMMERCIAL

## 2023-12-20 VITALS
HEART RATE: 97 BPM | TEMPERATURE: 96.7 F | OXYGEN SATURATION: 99 % | RESPIRATION RATE: 18 BRPM | SYSTOLIC BLOOD PRESSURE: 136 MMHG | DIASTOLIC BLOOD PRESSURE: 90 MMHG

## 2023-12-20 DIAGNOSIS — D50.0 IRON DEFICIENCY ANEMIA DUE TO CHRONIC BLOOD LOSS: Primary | ICD-10-CM

## 2023-12-20 PROCEDURE — 96365 THER/PROPH/DIAG IV INF INIT: CPT

## 2023-12-20 RX ORDER — SODIUM CHLORIDE 9 MG/ML
20 INJECTION, SOLUTION INTRAVENOUS ONCE
Status: CANCELLED | OUTPATIENT
Start: 2023-12-26

## 2023-12-20 RX ORDER — SODIUM CHLORIDE 9 MG/ML
20 INJECTION, SOLUTION INTRAVENOUS ONCE
Status: COMPLETED | OUTPATIENT
Start: 2023-12-20 | End: 2023-12-20

## 2023-12-20 RX ADMIN — SODIUM CHLORIDE 20 ML/HR: 0.9 INJECTION, SOLUTION INTRAVENOUS at 10:27

## 2023-12-20 RX ADMIN — IRON SUCROSE 200 MG: 20 INJECTION, SOLUTION INTRAVENOUS at 10:28

## 2023-12-20 NOTE — PROGRESS NOTES
Pt tolerated venofer without incident. Confirmed pts next appt for 12/26/23 @ 0830 @ [facility].

## 2023-12-21 DIAGNOSIS — D50.0 IRON DEFICIENCY ANEMIA DUE TO CHRONIC BLOOD LOSS: Primary | ICD-10-CM

## 2023-12-22 ENCOUNTER — OFFICE VISIT (OUTPATIENT)
Dept: FAMILY MEDICINE CLINIC | Facility: CLINIC | Age: 43
End: 2023-12-22

## 2023-12-22 VITALS
WEIGHT: 224.6 LBS | TEMPERATURE: 98.1 F | DIASTOLIC BLOOD PRESSURE: 90 MMHG | BODY MASS INDEX: 35.25 KG/M2 | RESPIRATION RATE: 18 BRPM | HEIGHT: 67 IN | HEART RATE: 82 BPM | SYSTOLIC BLOOD PRESSURE: 135 MMHG | OXYGEN SATURATION: 100 %

## 2023-12-22 DIAGNOSIS — Z00.00 ANNUAL PHYSICAL EXAM: Primary | ICD-10-CM

## 2023-12-22 DIAGNOSIS — D50.8 OTHER IRON DEFICIENCY ANEMIA: ICD-10-CM

## 2023-12-22 DIAGNOSIS — H66.001 NON-RECURRENT ACUTE SUPPURATIVE OTITIS MEDIA OF RIGHT EAR WITHOUT SPONTANEOUS RUPTURE OF TYMPANIC MEMBRANE: ICD-10-CM

## 2023-12-22 DIAGNOSIS — G44.221 CHRONIC TENSION-TYPE HEADACHE, INTRACTABLE: ICD-10-CM

## 2023-12-22 PROCEDURE — 99396 PREV VISIT EST AGE 40-64: CPT | Performed by: FAMILY MEDICINE

## 2023-12-22 RX ORDER — RIZATRIPTAN BENZOATE 10 MG/1
10 TABLET, ORALLY DISINTEGRATING ORAL ONCE AS NEEDED
Qty: 9 TABLET | Refills: 3 | Status: SHIPPED | OUTPATIENT
Start: 2023-12-22

## 2023-12-22 NOTE — ASSESSMENT & PLAN NOTE
- Immunizations up to date  - Last pap smear: 2018  Next pap smear due: 2023  Contraception: none  Gyn surgeries: c sections x 3  - Last mammogram: April. 2022, next scheduled for Feb 2024  - Last colonoscopy: Aug 2023, recall in 3 years  - Smoking history: never  - Family History:  Cardiac hx: none  Cancers: Denies breast, ovarian, uterine, cervical, endometrial, colorectal.  Dad: prostate cancer; Mom passed away with stomach cancer at 66 yoa.  - VSS  - PE: unremarkable, Body mass index is 35.18 kg/m².    Plan:  Lipid panel  A1C  BMP  Lifestyle modifications with diet and exercise  F/U in 3 months

## 2023-12-22 NOTE — PROGRESS NOTES
ADULT ANNUAL PHYSICAL  [facility] [place]    NAME: Rebeca Jacobsen  AGE: 43 y.o. SEX: female  : 1980     DATE: 2023     Assessment and Plan:     Problem List Items Addressed This Visit       Annual physical exam - Primary     - Immunizations up to date  - Last pap smear:   Next pap smear due:   Contraception: none  Gyn surgeries: c sections x 3  - Last mammogram: 2022, next scheduled for 2024  - Last colonoscopy: Aug 2023, recall in 3 years  - Smoking history: never  - Family History:  Cardiac hx: none  Cancers: Denies breast, ovarian, uterine, cervical, endometrial, colorectal.  Dad: prostate cancer; Mom passed away with stomach cancer at 66 yoa.  - VSS  - PE: unremarkable, Body mass index is 35.18 kg/m².    Plan:  Lipid panel  A1C  BMP  Lifestyle modifications with diet and exercise  F/U in 3 months           Relevant Orders    Lipid Panel with Direct LDL reflex    Hemoglobin A1C    Basic metabolic panel    Iron deficiency anemia     - Severe JAVAD  - Currently receiving IV iron infusions  - Symptoms improved since start of IV infusions  - Following with GI; colonoscopy  negative for bleeding, recall recommended in 3 years; recommended for capsule endoscopy  - Hx of menorrhagia; scheduled for OBGYN appointment in 2024  - Follows with Heme/Onc  Continue IV iron infusions  Incorporate iron rich diet  F/U with GI, OBGYN, H/O as scheduled           Chronic tension-type headache, intractable     - Continued migraines without aura, previously prescribed rizatriptan however patient non compliant with medication  - Was previously referred to Neurology back in  however patient was unable to make appointment during times of COVID    Refill rizatriptan 10 mg PRN at night  Referral to Neurology         Relevant Medications    rizatriptan (MAXALT-MLT) 10 mg disintegrating tablet    Other Relevant Orders    Ambulatory  Referral to Neurology    Non-recurrent acute suppurative otitis media of right ear without spontaneous rupture of tympanic membrane     - Seen on 12/18/23 for R sided otitis media   - Amoxicillin 500 mg q8h day 5/7   - Symptoms significantly improved  Continue amoxicillin till full course ends  F/U PRN            Immunizations and preventive care screenings were discussed with patient today. Appropriate education was printed on patient's after visit summary.    Counseling:  Alcohol/drug use: discussed moderation in alcohol intake, the recommendations for healthy alcohol use, and avoidance of illicit drug use.  Dental Health: discussed importance of regular tooth brushing, flossing, and dental visits.  Injury prevention: discussed safety/seat belts, safety helmets, smoke detectors, carbon dioxide detectors, and smoking near bedding or upholstery.  Sexual health: discussed sexually transmitted diseases, partner selection, use of condoms, avoidance of unintended pregnancy, and contraceptive alternatives.  Exercise: the importance of regular exercise/physical activity was discussed. Recommend exercise 3-5 times per week for at least 30 minutes.     BMI Counseling: Body mass index is 35.18 kg/m². The BMI is above normal. Nutrition recommendations include decreasing portion sizes, encouraging healthy choices of fruits and vegetables, decreasing fast food intake, consuming healthier snacks, limiting drinks that contain sugar, moderation in carbohydrate intake, increasing intake of lean protein, reducing intake of saturated and trans fat and reducing intake of cholesterol. Exercise recommendations include moderate physical activity 150 minutes/week. No pharmacotherapy was ordered. Rationale for BMI follow-up plan is due to patient being overweight or obese.         Return in about 4 weeks (around 1/19/2024) for follow up blood work .     Chief Complaint:     Chief Complaint   Patient presents with    Physical Exam      Worried about her headaches. Comes and goes      History of Present Illness:     Adult Annual Physical   Patient here for a comprehensive physical exam. The patient reports problems - migraines .    Diet and Physical Activity  Diet/Nutrition: well balanced diet, heart healthy (low sodium) diet, consuming 3-5 servings of fruits/vegetables daily, adequate fiber intake, and adequate whole grain intake.   Exercise: no formal exercise.      Depression Screening  PHQ-2/9 Depression Screening           General Health  Sleep: gets 4-6 hours of sleep on average, snores loudly, and experiences daytime hypersomnolence.   Hearing: normal - bilateral.  Vision: no vision problems and most recent eye exam >1 year ago.   Dental: no dental visits for >1 year and brushes teeth twice daily.       /GYN Health  Follows with gynecology? yes   Patient is: premenopausal  Last menstrual period: 12/6/23, 7-10 days   Contraceptive method:  none .    Advanced Care Planning  Do you have an advanced directive? no  Do you have a durable medical power of ? no     Review of Systems:     Review of Systems   Constitutional:  Negative for chills and fever.   HENT:  Negative for ear pain and sore throat.    Eyes:  Negative for pain and visual disturbance.   Respiratory:  Negative for cough and shortness of breath.    Cardiovascular:  Negative for chest pain and palpitations.   Gastrointestinal:  Negative for abdominal pain and vomiting.   Genitourinary:  Negative for dysuria and hematuria.   Musculoskeletal:  Negative for arthralgias and back pain.   Skin:  Negative for color change and rash.   Neurological:  Negative for seizures and syncope.   All other systems reviewed and are negative.     Past Medical History:     Past Medical History:   Diagnosis Date    Abnormal Pap smear of cervix     Fibroid     Heart murmur     Iron deficiency anemia     due to heavy menstrual bleeding. needed iron infusions    Migraine     Other acute sinusitis        Past Surgical History:     Past Surgical History:   Procedure Laterality Date     SECTION      CHOLECYSTECTOMY      COLPOSCOPY        Social History:     Social History     Socioeconomic History    Marital status: Single     Spouse name: Not on file    Number of children: Not on file    Years of education: Not on file    Highest education level: Not on file   Occupational History    Not on file   Tobacco Use    Smoking status: Never    Smokeless tobacco: Never   Vaping Use    Vaping status: Never Used   Substance and Sexual Activity    Alcohol use: Yes     Comment: rare    Drug use: Never    Sexual activity: Yes     Partners: Male     Birth control/protection: Condom Male, None   Other Topics Concern    Not on file   Social History Narrative    Not on file     Social Determinants of Health     Financial Resource Strain: Low Risk  (5/15/2023)    Overall Financial Resource Strain (CARDIA)     Difficulty of Paying Living Expenses: Not very hard   Food Insecurity: No Food Insecurity (5/15/2023)    Hunger Vital Sign     Worried About Running Out of Food in the Last Year: Never true     Ran Out of Food in the Last Year: Never true   Transportation Needs: No Transportation Needs (5/15/2023)    PRAPARE - Transportation     Lack of Transportation (Medical): No     Lack of Transportation (Non-Medical): No   Physical Activity: Insufficiently Active (5/15/2023)    Exercise Vital Sign     Days of Exercise per Week: 2 days     Minutes of Exercise per Session: 20 min   Stress: No Stress Concern Present (5/15/2023)    [employer] - Occupational Stress Questionnaire     Feeling of Stress : Not at all   Social Connections: Not on file   Intimate Partner Violence: Not At Risk (5/15/2023)    Humiliation, Afraid, Rape, and Kick questionnaire     Fear of Current or Ex-Partner: No     Emotionally Abused: No     Physically Abused: No     Sexually Abused: No   Housing Stability: Low Risk   "(5/15/2023)    Housing Stability Vital Sign     Unable to Pay for Housing in the Last Year: No     Number of Places Lived in the Last Year: 1     Unstable Housing in the Last Year: No      Family History:     Family History   Problem Relation Age of Onset    Hypertension Mother     Stomach cancer Mother     Colon cancer Father     Uterine cancer Sister     No Known Problems Sister     No Known Problems Sister     No Known Problems Sister     Lung cancer Brother     No Known Problems Brother     No Known Problems Brother     No Known Problems Maternal Grandmother     No Known Problems Maternal Grandfather     No Known Problems Paternal Grandmother     No Known Problems Paternal Grandfather     Asthma Daughter     No Known Problems Son     No Known Problems Son       Current Medications:     Current Outpatient Medications   Medication Sig Dispense Refill    amoxicillin (AMOXIL) 500 mg capsule Take 1 capsule (500 mg total) by mouth every 8 (eight) hours for 7 days 21 capsule 0    rizatriptan (MAXALT-MLT) 10 mg disintegrating tablet Take 1 tablet (10 mg total) by mouth once as needed for migraine for up to 36 doses May repeat in 2 hours if needed 9 tablet 3    naproxen (EC NAPROSYN) 500 MG EC tablet Take 1 tablet (500 mg total) by mouth 2 (two) times a day with meals (Patient not taking: Reported on 11/9/2023) 30 tablet 0     No current facility-administered medications for this visit.      Allergies:     No Known Allergies   Physical Exam:     /90 (BP Location: Left arm, Patient Position: Sitting, Cuff Size: Large)   Pulse 82   Temp 98.1 °F (36.7 °C) (Temporal)   Resp 18   Ht 5' 7\" (1.702 m)   Wt 102 kg (224 lb 9.6 oz)   SpO2 100%   BMI 35.18 kg/m²     Physical Exam  Vitals and nursing note reviewed.   Constitutional:       General: She is not in acute distress.     Appearance: Normal appearance. She is well-developed. She is obese.   HENT:      Head: Normocephalic and atraumatic.      Right Ear: Tympanic " membrane, ear canal and external ear normal.      Left Ear: Tympanic membrane, ear canal and external ear normal.      Nose: Nose normal.      Mouth/Throat:      Mouth: Mucous membranes are moist.      Pharynx: Oropharynx is clear.   Eyes:      Extraocular Movements: Extraocular movements intact.      Conjunctiva/sclera: Conjunctivae normal.   Cardiovascular:      Rate and Rhythm: Normal rate and regular rhythm.      Pulses: Normal pulses.      Heart sounds: Normal heart sounds. No murmur heard.  Pulmonary:      Effort: Pulmonary effort is normal. No respiratory distress.      Breath sounds: Normal breath sounds.   Abdominal:      General: Abdomen is flat. Bowel sounds are normal.      Palpations: Abdomen is soft.      Tenderness: There is no abdominal tenderness. There is no right CVA tenderness or left CVA tenderness.   Musculoskeletal:         General: No swelling.      Cervical back: Neck supple.   Skin:     General: Skin is warm and dry.      Capillary Refill: Capillary refill takes less than 2 seconds.      Coloration: Skin is not jaundiced.      Findings: No rash.   Neurological:      General: No focal deficit present.      Mental Status: She is alert and oriented to person, place, and time. Mental status is at baseline.   Psychiatric:         Mood and Affect: Mood normal.         Behavior: Behavior normal.          Dante Jimenez MD  [facility]

## 2023-12-23 PROBLEM — Z23 NEED FOR INFLUENZA VACCINATION: Status: RESOLVED | Noted: 2023-12-18 | Resolved: 2023-12-23

## 2023-12-23 PROBLEM — J01.80 OTHER ACUTE SINUSITIS: Status: RESOLVED | Noted: 2019-07-17 | Resolved: 2023-12-23

## 2023-12-23 NOTE — ASSESSMENT & PLAN NOTE
- Continued migraines without aura, previously prescribed rizatriptan however patient non compliant with medication  - Was previously referred to Neurology back in 2021 however patient was unable to make appointment during times of COVID    Refill rizatriptan 10 mg PRN at night  Referral to Neurology

## 2023-12-23 NOTE — ASSESSMENT & PLAN NOTE
- Severe JAVAD  - Currently receiving IV iron infusions  - Symptoms improved since start of IV infusions  - Following with GI; colonoscopy 2023 negative for bleeding, recall recommended in 3 years; recommended for capsule endoscopy  - Hx of menorrhagia; scheduled for OBGYN appointment in Jan 2024  - Follows with Heme/Onc  Continue IV iron infusions  Incorporate iron rich diet  F/U with GI, OBGYN, H/O as scheduled

## 2023-12-23 NOTE — ASSESSMENT & PLAN NOTE
- Seen on 12/18/23 for R sided otitis media   - Amoxicillin 500 mg q8h day 5/7   - Symptoms significantly improved  Continue amoxicillin till full course ends  F/U PRN

## 2023-12-26 ENCOUNTER — HOSPITAL ENCOUNTER (OUTPATIENT)
Dept: INFUSION CENTER | Facility: CLINIC | Age: 43
Discharge: HOME | End: 2023-12-26
Payer: COMMERCIAL

## 2023-12-26 VITALS
OXYGEN SATURATION: 99 % | SYSTOLIC BLOOD PRESSURE: 136 MMHG | HEART RATE: 87 BPM | DIASTOLIC BLOOD PRESSURE: 72 MMHG | RESPIRATION RATE: 16 BRPM | TEMPERATURE: 97.6 F

## 2023-12-26 DIAGNOSIS — D50.0 IRON DEFICIENCY ANEMIA DUE TO CHRONIC BLOOD LOSS: Primary | ICD-10-CM

## 2023-12-26 PROCEDURE — 96365 THER/PROPH/DIAG IV INF INIT: CPT

## 2023-12-26 RX ORDER — SODIUM CHLORIDE 9 MG/ML
20 INJECTION, SOLUTION INTRAVENOUS ONCE
Status: COMPLETED | OUTPATIENT
Start: 2023-12-26 | End: 2023-12-26

## 2023-12-26 RX ORDER — SODIUM CHLORIDE 9 MG/ML
20 INJECTION, SOLUTION INTRAVENOUS ONCE
Status: CANCELLED | OUTPATIENT
Start: 2024-01-03

## 2023-12-26 RX ADMIN — IRON SUCROSE 200 MG: 20 INJECTION, SOLUTION INTRAVENOUS at 08:46

## 2023-12-26 RX ADMIN — SODIUM CHLORIDE 20 ML/HR: 0.9 INJECTION, SOLUTION INTRAVENOUS at 08:46

## 2023-12-26 NOTE — PROGRESS NOTES
Pt to clinic for venofer infusion, pt tolerated without complications, aware of next appointment on 1/3/24 at 2:30

## 2024-01-03 ENCOUNTER — HOSPITAL ENCOUNTER (OUTPATIENT)
Dept: INFUSION CENTER | Facility: CLINIC | Age: 44
Discharge: HOME | End: 2024-01-03
Payer: COMMERCIAL

## 2024-01-03 VITALS
TEMPERATURE: 97.5 F | RESPIRATION RATE: 18 BRPM | SYSTOLIC BLOOD PRESSURE: 144 MMHG | HEART RATE: 86 BPM | DIASTOLIC BLOOD PRESSURE: 92 MMHG | OXYGEN SATURATION: 98 %

## 2024-01-03 DIAGNOSIS — D50.0 IRON DEFICIENCY ANEMIA DUE TO CHRONIC BLOOD LOSS: Primary | ICD-10-CM

## 2024-01-03 RX ORDER — SODIUM CHLORIDE 9 MG/ML
20 INJECTION, SOLUTION INTRAVENOUS ONCE
Status: CANCELLED | OUTPATIENT
Start: 2024-01-09

## 2024-01-03 RX ORDER — SODIUM CHLORIDE 9 MG/ML
20 INJECTION, SOLUTION INTRAVENOUS ONCE
Status: COMPLETED | OUTPATIENT
Start: 2024-01-03 | End: 2024-01-03

## 2024-01-03 RX ADMIN — SODIUM CHLORIDE 20 ML/HR: 0.9 INJECTION, SOLUTION INTRAVENOUS at 14:52

## 2024-01-03 RX ADMIN — IRON SUCROSE 200 MG: 20 INJECTION, SOLUTION INTRAVENOUS at 14:53

## 2024-01-03 NOTE — PROGRESS NOTES
Patient here for venofer, offers no complaints. Tolerated infusion without incident. This is her last infusion. She has a follow up with her provider on 1/14 and she is aware that she needs labs checked prior to that appointment. Declined AVS.

## 2024-01-08 ENCOUNTER — APPOINTMENT (OUTPATIENT)
Dept: LAB | Facility: CLINIC | Age: 44
End: 2024-01-08
Payer: COMMERCIAL

## 2024-01-08 DIAGNOSIS — Z00.00 ANNUAL PHYSICAL EXAM: ICD-10-CM

## 2024-01-08 DIAGNOSIS — D50.0 IRON DEFICIENCY ANEMIA DUE TO CHRONIC BLOOD LOSS: ICD-10-CM

## 2024-01-08 LAB
ANION GAP SERPL CALCULATED.3IONS-SCNC: 8 MMOL/L
BASOPHILS # BLD AUTO: 0.04 THOUSANDS/ÂΜL (ref 0–0.1)
BASOPHILS NFR BLD AUTO: 1 % (ref 0–1)
BUN SERPL-MCNC: 12 MG/DL (ref 5–25)
CALCIUM SERPL-MCNC: 8.8 MG/DL (ref 8.4–10.2)
CHLORIDE SERPL-SCNC: 104 MMOL/L (ref 96–108)
CHOLEST SERPL-MCNC: 201 MG/DL
CO2 SERPL-SCNC: 28 MMOL/L (ref 21–32)
CREAT SERPL-MCNC: 0.81 MG/DL (ref 0.6–1.3)
EOSINOPHIL # BLD AUTO: 0.29 THOUSAND/ÂΜL (ref 0–0.61)
EOSINOPHIL NFR BLD AUTO: 4 % (ref 0–6)
ERYTHROCYTE [DISTWIDTH] IN BLOOD BY AUTOMATED COUNT: 28.7 % (ref 11.6–15.1)
EST. AVERAGE GLUCOSE BLD GHB EST-MCNC: 114 MG/DL
FERRITIN SERPL-MCNC: 141 NG/ML (ref 11–307)
GFR SERPL CREATININE-BSD FRML MDRD: 88 ML/MIN/1.73SQ M
GLUCOSE P FAST SERPL-MCNC: 107 MG/DL (ref 65–99)
HBA1C MFR BLD: 5.6 %
HCT VFR BLD AUTO: 39.8 % (ref 34.8–46.1)
HDLC SERPL-MCNC: 60 MG/DL
HGB BLD-MCNC: 11.8 G/DL (ref 11.5–15.4)
IMM GRANULOCYTES # BLD AUTO: 0.02 THOUSAND/UL (ref 0–0.2)
IMM GRANULOCYTES NFR BLD AUTO: 0 % (ref 0–2)
IRON SATN MFR SERPL: 16 % (ref 15–50)
IRON SERPL-MCNC: 50 UG/DL (ref 50–212)
LDLC SERPL CALC-MCNC: 118 MG/DL (ref 0–100)
LYMPHOCYTES # BLD AUTO: 1.7 THOUSANDS/ÂΜL (ref 0.6–4.47)
LYMPHOCYTES NFR BLD AUTO: 22 % (ref 14–44)
MCH RBC QN AUTO: 24.1 PG (ref 26.8–34.3)
MCHC RBC AUTO-ENTMCNC: 29.6 G/DL (ref 31.4–37.4)
MCV RBC AUTO: 81 FL (ref 82–98)
MONOCYTES # BLD AUTO: 0.49 THOUSAND/ÂΜL (ref 0.17–1.22)
MONOCYTES NFR BLD AUTO: 6 % (ref 4–12)
NEUTROPHILS # BLD AUTO: 5.23 THOUSANDS/ÂΜL (ref 1.85–7.62)
NEUTS SEG NFR BLD AUTO: 67 % (ref 43–75)
NRBC BLD AUTO-RTO: 0 /100 WBCS
PLATELET # BLD AUTO: 427 THOUSANDS/UL (ref 149–390)
PMV BLD AUTO: 8.9 FL (ref 8.9–12.7)
POTASSIUM SERPL-SCNC: 3.4 MMOL/L (ref 3.5–5.3)
RBC # BLD AUTO: 4.9 MILLION/UL (ref 3.81–5.12)
SODIUM SERPL-SCNC: 140 MMOL/L (ref 135–147)
TIBC SERPL-MCNC: 311 UG/DL (ref 250–450)
TRIGL SERPL-MCNC: 116 MG/DL
UIBC SERPL-MCNC: 261 UG/DL (ref 155–355)
WBC # BLD AUTO: 7.77 THOUSAND/UL (ref 4.31–10.16)

## 2024-01-08 PROCEDURE — 83036 HEMOGLOBIN GLYCOSYLATED A1C: CPT

## 2024-01-08 PROCEDURE — 36415 COLL VENOUS BLD VENIPUNCTURE: CPT

## 2024-01-08 PROCEDURE — 82728 ASSAY OF FERRITIN: CPT

## 2024-01-08 PROCEDURE — 85025 COMPLETE CBC W/AUTO DIFF WBC: CPT

## 2024-01-08 PROCEDURE — 83550 IRON BINDING TEST: CPT

## 2024-01-08 PROCEDURE — 83540 ASSAY OF IRON: CPT

## 2024-01-08 PROCEDURE — 80048 BASIC METABOLIC PNL TOTAL CA: CPT

## 2024-01-08 PROCEDURE — 80061 LIPID PANEL: CPT

## 2024-01-16 ENCOUNTER — TELEPHONE (OUTPATIENT)
Dept: HEMATOLOGY ONCOLOGY | Facility: CLINIC | Age: 44
End: 2024-01-16

## 2024-01-16 ENCOUNTER — TELEMEDICINE (OUTPATIENT)
Dept: HEMATOLOGY ONCOLOGY | Facility: CLINIC | Age: 44
End: 2024-01-16
Payer: COMMERCIAL

## 2024-01-16 DIAGNOSIS — D50.0 IRON DEFICIENCY ANEMIA DUE TO CHRONIC BLOOD LOSS: Primary | ICD-10-CM

## 2024-01-16 DIAGNOSIS — N92.1 MENOMETRORRHAGIA: ICD-10-CM

## 2024-01-16 PROCEDURE — 99214 OFFICE O/P EST MOD 30 MIN: CPT | Performed by: PHYSICIAN ASSISTANT

## 2024-01-16 NOTE — TELEPHONE ENCOUNTER
Appointment Confirmation   Who are you speaking with? Patient   If it is not the patient, are they listed on an active communication consent form? Yes   Which provider is the appointment scheduled with?  Zulma Jenkins PA-C   When is the appointment scheduled?  Please list date and time 1/16 12:30pm   At which location is the appointment scheduled to take place? Virtual   Did caller verbalize understanding of appointment details? Yes

## 2024-01-16 NOTE — TELEPHONE ENCOUNTER
Patient Call    Who are you speaking with? Patient    If it is not the patient, are they listed on an active communication consent form? N/A   What is the reason for this call? She asked if appt today with Zulma Jenkins could be a phone call   Does this require a call back? Yes   If a call back is required, please list best call back number [phone]    If a call back is required, advise that a message will be forwarded to their care team and someone will return their call as soon as possible.   Did you relay this information to the patient? Yes

## 2024-01-16 NOTE — PROGRESS NOTES
[address] [facility] [place]  [address]  [place] [address]  Hematology Ambulatory Follow-Up  Rebeca Jacobsen, 1980, [id number]  1/16/2024      Assessment and Plan   1. Iron deficiency anemia due to chronic blood loss  ***  - CBC and differential; Future  - Iron Panel (Includes Ferritin, Iron Sat%, Iron, and TIBC); Future  - CBC and differential; Future  - Reticulocytes; Future    2. Menometrorrhagia  ***  - CBC and differential; Future  - Iron Panel (Includes Ferritin, Iron Sat%, Iron, and TIBC); Future  - CBC and differential; Future  - Reticulocytes; Future      The patient is scheduled for follow-up in approximately *** with  ***.     Patient*** voiced agreement and understanding to the above.   Patient*** advised to call the Hematology/Oncology office with any questions and concerns regarding the above.    Barrier(s) to care: None***  The patient is *** able to self care.    Zulma Jenkins PA-C  Medical Oncology/Hematology  [facility]    Subjective     Chief Complaint   Patient presents with    Follow-up       History of present illness:       Review of Systems   Constitutional:  Positive for fatigue. Negative for appetite change, fever and unexpected weight change.   HENT:  Negative for nosebleeds.    Respiratory:  Negative for cough, choking and shortness of breath.         Negative hemoptysis.   Cardiovascular:  Negative for chest pain, palpitations and leg swelling.   Gastrointestinal: Negative.  Negative for abdominal distention, abdominal pain, anal bleeding, blood in stool, constipation, diarrhea, nausea and vomiting.   Endocrine: Negative.  Negative for cold intolerance.   Genitourinary: Negative.  Negative for hematuria, menstrual problem, vaginal bleeding, vaginal discharge and vaginal pain.   Musculoskeletal: Negative.  Negative for arthralgias, myalgias, neck pain and neck stiffness.   Skin: Negative.   Negative for color change, pallor and rash.   Allergic/Immunologic: Negative.  Negative for immunocompromised state.   Neurological: Negative.  Negative for weakness and headaches.   Hematological:  Negative for adenopathy. Does not bruise/bleed easily.   All other systems reviewed and are negative.      Patient Active Problem List   Diagnosis    Migraine with aura and without status migrainosus, not intractable    Menorrhagia with irregular cycle    Annual physical exam    Uterine leiomyoma    Right ovarian cyst    Iron deficiency anemia    Chronic tension-type headache, intractable    Traumatic incomplete tear of left rotator cuff    Gastroesophageal reflux disease    Iron deficiency anemia due to chronic blood loss    Non-recurrent acute suppurative otitis media of right ear without spontaneous rupture of tympanic membrane     Past Medical History:   Diagnosis Date    Abnormal Pap smear of cervix     Fibroid     Heart murmur     Iron deficiency anemia     due to heavy menstrual bleeding. needed iron infusions    Migraine     Other acute sinusitis      Past Surgical History:   Procedure Laterality Date     SECTION      CHOLECYSTECTOMY      COLPOSCOPY       Family History   Problem Relation Age of Onset    Hypertension Mother     Stomach cancer Mother     Colon cancer Father     Uterine cancer Sister     No Known Problems Sister     No Known Problems Sister     No Known Problems Sister     Lung cancer Brother     No Known Problems Brother     No Known Problems Brother     No Known Problems Maternal Grandmother     No Known Problems Maternal Grandfather     No Known Problems Paternal Grandmother     No Known Problems Paternal Grandfather     Asthma Daughter     No Known Problems Son     No Known Problems Son      Social History     Socioeconomic History    Marital status: Single     Spouse name: Not on file    Number of children: Not on file    Years of education: Not on file    Highest education level: Not  on file   Occupational History    Not on file   Tobacco Use    Smoking status: Never    Smokeless tobacco: Never   Vaping Use    Vaping status: Never Used   Substance and Sexual Activity    Alcohol use: Yes     Comment: rare    Drug use: Never    Sexual activity: Yes     Partners: Male     Birth control/protection: Condom Male, None   Other Topics Concern    Not on file   Social History Narrative    Not on file     Social Determinants of Health     Financial Resource Strain: Low Risk  (5/15/2023)    Overall Financial Resource Strain (CARDIA)     Difficulty of Paying Living Expenses: Not very hard   Food Insecurity: No Food Insecurity (5/15/2023)    Hunger Vital Sign     Worried About Running Out of Food in the Last Year: Never true     Ran Out of Food in the Last Year: Never true   Transportation Needs: No Transportation Needs (5/15/2023)    PRAPARE - Transportation     Lack of Transportation (Medical): No     Lack of Transportation (Non-Medical): No   Physical Activity: Insufficiently Active (5/15/2023)    Exercise Vital Sign     Days of Exercise per Week: 2 days     Minutes of Exercise per Session: 20 min   Stress: No Stress Concern Present (5/15/2023)    [employer] - Occupational Stress Questionnaire     Feeling of Stress : Not at all   Social Connections: Not on file   Intimate Partner Violence: Not At Risk (5/15/2023)    Humiliation, Afraid, Rape, and Kick questionnaire     Fear of Current or Ex-Partner: No     Emotionally Abused: No     Physically Abused: No     Sexually Abused: No   Housing Stability: Low Risk  (5/15/2023)    Housing Stability Vital Sign     Unable to Pay for Housing in the Last Year: No     Number of Places Lived in the Last Year: 1     Unstable Housing in the Last Year: No       Current Outpatient Medications:     rizatriptan (MAXALT-MLT) 10 mg disintegrating tablet, Take 1 tablet (10 mg total) by mouth once as needed for migraine for up to 36 doses May repeat  in 2 hours if needed, Disp: 9 tablet, Rfl: 3    naproxen (EC NAPROSYN) 500 MG EC tablet, Take 1 tablet (500 mg total) by mouth 2 (two) times a day with meals (Patient not taking: Reported on 11/9/2023), Disp: 30 tablet, Rfl: 0  No Known Allergies    Objective   There were no vitals taken for this visit.   Physical Exam    Result Review  Labs:  ***  Imaging:   ***  Please note:  This report has been generated by a voice recognition software system. Therefore there may be syntax, spelling, and/or grammatical errors. Please call if you have any questions.

## 2024-01-16 NOTE — PATIENT INSTRUCTIONS
[facility] Medical Oncology and Hematology Team  [facility] [place] - [phone]    Your Team Members:  Advanced Practitioner:  Zulma Jenkins PA-C  Oncology Nurse:   NUNO Armstrong ([phone]) M-F 8am - 4:30pm    Please answer Private and Unavailable Calls - this may be your team(s) contacting you.  If you have medical questions/concerns/issues - contact us either by (1) My Chart (2) [facility]

## 2024-01-17 ENCOUNTER — APPOINTMENT (OUTPATIENT)
Dept: LAB | Facility: AMBULARY SURGERY CENTER | Age: 44
End: 2024-01-17
Payer: COMMERCIAL

## 2024-01-17 ENCOUNTER — OFFICE VISIT (OUTPATIENT)
Dept: OBGYN CLINIC | Facility: CLINIC | Age: 44
End: 2024-01-17
Payer: COMMERCIAL

## 2024-01-17 VITALS
HEIGHT: 67 IN | SYSTOLIC BLOOD PRESSURE: 134 MMHG | DIASTOLIC BLOOD PRESSURE: 94 MMHG | BODY MASS INDEX: 35.31 KG/M2 | WEIGHT: 225 LBS

## 2024-01-17 DIAGNOSIS — N92.1 MENORRHAGIA WITH IRREGULAR CYCLE: ICD-10-CM

## 2024-01-17 DIAGNOSIS — D25.9 UTERINE LEIOMYOMA, UNSPECIFIED LOCATION: Primary | ICD-10-CM

## 2024-01-17 LAB
BASOPHILS # BLD AUTO: 0.05 THOUSANDS/ÂΜL (ref 0–0.1)
BASOPHILS NFR BLD AUTO: 1 % (ref 0–1)
EOSINOPHIL # BLD AUTO: 0.06 THOUSAND/ÂΜL (ref 0–0.61)
EOSINOPHIL NFR BLD AUTO: 1 % (ref 0–6)
ERYTHROCYTE [DISTWIDTH] IN BLOOD BY AUTOMATED COUNT: 27.1 % (ref 11.6–15.1)
HCT VFR BLD AUTO: 42.2 % (ref 34.8–46.1)
HGB BLD-MCNC: 12.4 G/DL (ref 11.5–15.4)
IMM GRANULOCYTES # BLD AUTO: 0.01 THOUSAND/UL (ref 0–0.2)
IMM GRANULOCYTES NFR BLD AUTO: 0 % (ref 0–2)
LYMPHOCYTES # BLD AUTO: 1.24 THOUSANDS/ÂΜL (ref 0.6–4.47)
LYMPHOCYTES NFR BLD AUTO: 27 % (ref 14–44)
MCH RBC QN AUTO: 24.3 PG (ref 26.8–34.3)
MCHC RBC AUTO-ENTMCNC: 29.4 G/DL (ref 31.4–37.4)
MCV RBC AUTO: 83 FL (ref 82–98)
MONOCYTES # BLD AUTO: 0.66 THOUSAND/ÂΜL (ref 0.17–1.22)
MONOCYTES NFR BLD AUTO: 15 % (ref 4–12)
NEUTROPHILS # BLD AUTO: 2.54 THOUSANDS/ÂΜL (ref 1.85–7.62)
NEUTS SEG NFR BLD AUTO: 56 % (ref 43–75)
NRBC BLD AUTO-RTO: 0 /100 WBCS
PLATELET # BLD AUTO: 356 THOUSANDS/UL (ref 149–390)
PMV BLD AUTO: 9.5 FL (ref 8.9–12.7)
RBC # BLD AUTO: 5.1 MILLION/UL (ref 3.81–5.12)
TSH SERPL DL<=0.05 MIU/L-ACNC: 1.2 UIU/ML (ref 0.45–4.5)
WBC # BLD AUTO: 4.56 THOUSAND/UL (ref 4.31–10.16)

## 2024-01-17 PROCEDURE — 85025 COMPLETE CBC W/AUTO DIFF WBC: CPT

## 2024-01-17 PROCEDURE — 36415 COLL VENOUS BLD VENIPUNCTURE: CPT

## 2024-01-17 PROCEDURE — 99213 OFFICE O/P EST LOW 20 MIN: CPT | Performed by: PHYSICIAN ASSISTANT

## 2024-01-17 PROCEDURE — 84443 ASSAY THYROID STIM HORMONE: CPT

## 2024-01-17 NOTE — PROGRESS NOTES
Assessment/Plan:  - Reviewed management options including progesterone only pill or IUD insertion vs surgical consult with physician  - Pt unsure at this time what she would like to do  - Will complete an updated pelvic ultrasound   - TSH and CBC ordered  - Will reach out to patient with results.        Diagnoses and all orders for this visit:    Uterine leiomyoma, unspecified location  -     US pelvis complete w transvaginal; Future    Menorrhagia with irregular cycle  -     Ambulatory Referral to Obstetrics / Gynecology  -     US pelvis complete w transvaginal; Future  -     TSH, 3rd generation with Free T4 reflex; Future  -     CBC and differential; Future          Subjective:      Patient ID: Rebeca Jacobsen is a 43 y.o. female.    Rebeca is a 42YO  AFAM female presenting to the office with complaints of continued heavy periods. Patient was seen in our office in 2022. She had a pelvic ultrasound which showed fibroids. She underwent an endometrial biopsy which was benign and she was recommended to have an IUD placed. Patient has not returned to our office. She has recently completed a course of iron infusions due to anemia from her cycles. Patient states her cycles have always been heavy. Some months she will get 2 cycles. The bleeding is always heavy and lasts for 7 days. The first 5 days are the heaviest. Patient previously tried COCP and DEPO and states she had horrible migraines. She is not currently interested in an IUD as she is not thrilled with the idea of a device inside her. She has not tried a progesterone only pill. She would like to know about medication to slow the bleeding down.         The following portions of the patient's history were reviewed and updated as appropriate: allergies, current medications, past family history, past medical history, past social history, past surgical history, and problem list.    Review of Systems   Constitutional:  Positive for fatigue.  "  Respiratory:  Negative for shortness of breath.    Cardiovascular:  Negative for chest pain.   Gastrointestinal:  Negative for abdominal pain.   Genitourinary:  Positive for menstrual problem.   Skin:  Negative for rash.         Objective:      /94 (BP Location: Right arm, Patient Position: Sitting, Cuff Size: Large)   Ht 5' 7\" (1.702 m)   Wt 102 kg (225 lb)   LMP 12/22/2023 (Exact Date)   BMI 35.24 kg/m²          Physical Exam  Vitals reviewed.   Constitutional:       General: She is not in acute distress.     Appearance: Normal appearance. She is obese. She is not ill-appearing, toxic-appearing or diaphoretic.   HENT:      Head: Normocephalic and atraumatic.   Pulmonary:      Effort: Pulmonary effort is normal.   Skin:     General: Skin is warm and dry.   Neurological:      General: No focal deficit present.      Mental Status: She is alert.   Psychiatric:         Mood and Affect: Mood normal.         Behavior: Behavior normal.           "

## 2024-01-22 ENCOUNTER — TELEPHONE (OUTPATIENT)
Dept: FAMILY MEDICINE CLINIC | Facility: CLINIC | Age: 44
End: 2024-01-22

## 2024-02-08 ENCOUNTER — TELEPHONE (OUTPATIENT)
Dept: FAMILY MEDICINE CLINIC | Facility: CLINIC | Age: 44
End: 2024-02-08

## 2024-02-08 DIAGNOSIS — E87.6 HYPOKALEMIA: Primary | ICD-10-CM

## 2024-02-08 RX ORDER — POTASSIUM CHLORIDE 20 MEQ/1
20 TABLET, EXTENDED RELEASE ORAL DAILY
Qty: 30 TABLET | Refills: 5 | Status: SHIPPED | OUTPATIENT
Start: 2024-02-08

## 2024-02-08 NOTE — TELEPHONE ENCOUNTER
Patient notified about lipid, A1C and low potassium at 3.4. Recommended for improved diet and exercise. Order for Kcl 20 mg placed. Patient in agreement, all questions answered.

## 2024-02-16 PROBLEM — H66.001 NON-RECURRENT ACUTE SUPPURATIVE OTITIS MEDIA OF RIGHT EAR WITHOUT SPONTANEOUS RUPTURE OF TYMPANIC MEMBRANE: Status: RESOLVED | Noted: 2023-12-18 | Resolved: 2024-02-16

## 2024-02-22 ENCOUNTER — TELEPHONE (OUTPATIENT)
Dept: FAMILY MEDICINE CLINIC | Facility: CLINIC | Age: 44
End: 2024-02-22

## 2024-02-22 NOTE — TELEPHONE ENCOUNTER
Patient called to request copy of visit notes from Annual Physical on 12/22/23, along with the Immunization Summary.     Records are at  pickup bin. Requires Medical Information Release signature.

## 2024-02-27 ENCOUNTER — CLINICAL SUPPORT (OUTPATIENT)
Dept: FAMILY MEDICINE CLINIC | Facility: CLINIC | Age: 44
End: 2024-02-27

## 2024-02-27 DIAGNOSIS — Z11.1 SCREENING-PULMONARY TB: Primary | ICD-10-CM

## 2024-02-27 PROCEDURE — 86580 TB INTRADERMAL TEST: CPT

## 2024-02-29 ENCOUNTER — CLINICAL SUPPORT (OUTPATIENT)
Dept: FAMILY MEDICINE CLINIC | Facility: CLINIC | Age: 44
End: 2024-02-29

## 2024-02-29 DIAGNOSIS — Z11.1 ENCOUNTER FOR PPD SKIN TEST READING: Primary | ICD-10-CM

## 2024-02-29 LAB
INDURATION: 0 MM
TB SKIN TEST: NEGATIVE

## 2024-03-29 ENCOUNTER — TELEPHONE (OUTPATIENT)
Dept: HEMATOLOGY ONCOLOGY | Facility: CLINIC | Age: 44
End: 2024-03-29

## 2024-03-29 NOTE — TELEPHONE ENCOUNTER
Left message for pt to call office regarding her appt scheduled on 4/19/24 with Zulma Jenkins. That appt does need to be rescheduled due to provider scheduling conflict. Another attempt will be made to contact pt.

## 2024-04-02 ENCOUNTER — TELEPHONE (OUTPATIENT)
Dept: HEMATOLOGY ONCOLOGY | Facility: CLINIC | Age: 44
End: 2024-04-02

## 2024-04-02 NOTE — TELEPHONE ENCOUNTER
Appointment Change  Cancel, Reschedule, Change to Virtual      Who are you speaking with? Patient   If it is not the patient, is the caller listed on the communication consent form? Yes   Which provider is the appointment scheduled with? Zulma Jenkins PA-C   When was the original appointment scheduled?    Please list date and time 4/19   At which location is the appointment scheduled to take place? [facility]   Was the appointment rescheduled?     Was the appointment changed from an in person visit to a virtual visit?    If so, please list the details of the change. 4/25 8am Jose Ramon   What is the reason for the appointment change? ELLA       Was STAR transport scheduled? No   Does STAR transport need to be scheduled for the new visit (if applicable) No   Does the patient need an infusion appointment rescheduled? No   Does the patient have an upcoming infusion appointment scheduled? If so, when? No   Is the patient undergoing chemotherapy? No   For appointments cancelled with less than 24 hours:  Was the no-show policy reviewed? Yes

## 2024-04-15 ENCOUNTER — APPOINTMENT (OUTPATIENT)
Dept: LAB | Facility: AMBULARY SURGERY CENTER | Age: 44
End: 2024-04-15
Payer: COMMERCIAL

## 2024-04-15 DIAGNOSIS — N92.1 MENOMETRORRHAGIA: ICD-10-CM

## 2024-04-15 DIAGNOSIS — D50.0 IRON DEFICIENCY ANEMIA DUE TO CHRONIC BLOOD LOSS: ICD-10-CM

## 2024-04-15 LAB
BASOPHILS # BLD AUTO: 0.05 THOUSANDS/ÂΜL (ref 0–0.1)
BASOPHILS NFR BLD AUTO: 1 % (ref 0–1)
EOSINOPHIL # BLD AUTO: 0.18 THOUSAND/ÂΜL (ref 0–0.61)
EOSINOPHIL NFR BLD AUTO: 2 % (ref 0–6)
ERYTHROCYTE [DISTWIDTH] IN BLOOD BY AUTOMATED COUNT: 16.5 % (ref 11.6–15.1)
FERRITIN SERPL-MCNC: 7 NG/ML (ref 11–307)
HCT VFR BLD AUTO: 41.4 % (ref 34.8–46.1)
HGB BLD-MCNC: 12.4 G/DL (ref 11.5–15.4)
IMM GRANULOCYTES # BLD AUTO: 0.03 THOUSAND/UL (ref 0–0.2)
IMM GRANULOCYTES NFR BLD AUTO: 0 % (ref 0–2)
IRON SATN MFR SERPL: 11 % (ref 15–50)
IRON SERPL-MCNC: 44 UG/DL (ref 50–212)
LYMPHOCYTES # BLD AUTO: 1.64 THOUSANDS/ÂΜL (ref 0.6–4.47)
LYMPHOCYTES NFR BLD AUTO: 20 % (ref 14–44)
MCH RBC QN AUTO: 25.2 PG (ref 26.8–34.3)
MCHC RBC AUTO-ENTMCNC: 30 G/DL (ref 31.4–37.4)
MCV RBC AUTO: 84 FL (ref 82–98)
MONOCYTES # BLD AUTO: 0.6 THOUSAND/ÂΜL (ref 0.17–1.22)
MONOCYTES NFR BLD AUTO: 7 % (ref 4–12)
NEUTROPHILS # BLD AUTO: 5.81 THOUSANDS/ÂΜL (ref 1.85–7.62)
NEUTS SEG NFR BLD AUTO: 70 % (ref 43–75)
NRBC BLD AUTO-RTO: 0 /100 WBCS
PLATELET # BLD AUTO: 470 THOUSANDS/UL (ref 149–390)
PMV BLD AUTO: 9.6 FL (ref 8.9–12.7)
RBC # BLD AUTO: 4.92 MILLION/UL (ref 3.81–5.12)
RETICS # AUTO: NORMAL 10*3/UL (ref 14097–95744)
RETICS # CALC: 1.22 % (ref 0.37–1.87)
TIBC SERPL-MCNC: 383 UG/DL (ref 250–450)
UIBC SERPL-MCNC: 339 UG/DL (ref 155–355)
WBC # BLD AUTO: 8.31 THOUSAND/UL (ref 4.31–10.16)

## 2024-04-15 PROCEDURE — 83540 ASSAY OF IRON: CPT

## 2024-04-15 PROCEDURE — 82728 ASSAY OF FERRITIN: CPT

## 2024-04-15 PROCEDURE — 85025 COMPLETE CBC W/AUTO DIFF WBC: CPT

## 2024-04-15 PROCEDURE — 36415 COLL VENOUS BLD VENIPUNCTURE: CPT

## 2024-04-15 PROCEDURE — 83550 IRON BINDING TEST: CPT

## 2024-04-15 PROCEDURE — 85045 AUTOMATED RETICULOCYTE COUNT: CPT

## 2024-04-25 ENCOUNTER — TELEPHONE (OUTPATIENT)
Dept: HEMATOLOGY ONCOLOGY | Facility: CLINIC | Age: 44
End: 2024-04-25

## 2024-04-25 NOTE — TELEPHONE ENCOUNTER
Patient missed her appointment today with Rachel Albright.  I called and left message for her to call back to reschedule her appointment.

## 2024-04-26 ENCOUNTER — TELEPHONE (OUTPATIENT)
Dept: HEMATOLOGY ONCOLOGY | Facility: CLINIC | Age: 44
End: 2024-04-26

## 2024-04-30 PROBLEM — Z00.00 ANNUAL PHYSICAL EXAM: Status: RESOLVED | Noted: 2019-10-02 | Resolved: 2024-04-30

## 2024-07-19 ENCOUNTER — TELEPHONE (OUTPATIENT)
Dept: HEMATOLOGY ONCOLOGY | Facility: CLINIC | Age: 44
End: 2024-07-19

## 2024-07-19 DIAGNOSIS — Z12.31 BREAST CANCER SCREENING BY MAMMOGRAM: ICD-10-CM

## 2024-07-19 DIAGNOSIS — D50.0 IRON DEFICIENCY ANEMIA DUE TO CHRONIC BLOOD LOSS: Primary | ICD-10-CM

## 2024-07-19 DIAGNOSIS — D50.9 MICROCYTIC ANEMIA: ICD-10-CM

## 2024-07-19 DIAGNOSIS — N92.1 MENOMETRORRHAGIA: ICD-10-CM

## 2024-07-19 NOTE — TELEPHONE ENCOUNTER
LVM with labs and appointment reminder.    Labs re-ordered for collection at any [facility] lab.    [facility] number provided, [phone].

## 2024-07-22 ENCOUNTER — TELEPHONE (OUTPATIENT)
Dept: SURGICAL ONCOLOGY | Facility: CLINIC | Age: 44
End: 2024-07-22

## 2024-07-22 NOTE — TELEPHONE ENCOUNTER
Left message on patient's voicemail regarding her no show appointment with Zulma Jenkins today.  Requested that if she would like to reschedule her missed appointment, she can call our office.

## 2025-03-10 ENCOUNTER — OFFICE VISIT (OUTPATIENT)
Dept: FAMILY MEDICINE CLINIC | Facility: CLINIC | Age: 45
End: 2025-03-10

## 2025-03-10 VITALS
BODY MASS INDEX: 39.62 KG/M2 | OXYGEN SATURATION: 99 % | HEART RATE: 99 BPM | RESPIRATION RATE: 18 BRPM | HEIGHT: 67 IN | SYSTOLIC BLOOD PRESSURE: 159 MMHG | WEIGHT: 252.4 LBS | TEMPERATURE: 97.8 F | DIASTOLIC BLOOD PRESSURE: 98 MMHG

## 2025-03-10 DIAGNOSIS — R03.0 ELEVATED BP WITHOUT DIAGNOSIS OF HYPERTENSION: ICD-10-CM

## 2025-03-10 DIAGNOSIS — M54.9 BACK PAIN, UNSPECIFIED BACK LOCATION, UNSPECIFIED BACK PAIN LATERALITY, UNSPECIFIED CHRONICITY: ICD-10-CM

## 2025-03-10 DIAGNOSIS — J06.9 VIRAL URI: Primary | ICD-10-CM

## 2025-03-10 PROCEDURE — 99213 OFFICE O/P EST LOW 20 MIN: CPT | Performed by: FAMILY MEDICINE

## 2025-03-10 RX ORDER — RIZATRIPTAN BENZOATE 10 MG/1
10 TABLET, ORALLY DISINTEGRATING ORAL ONCE AS NEEDED
Qty: 9 TABLET | Refills: 3 | Status: CANCELLED | OUTPATIENT
Start: 2025-03-10

## 2025-03-10 RX ORDER — LIDOCAINE 50 MG/G
1 PATCH TOPICAL DAILY PRN
Qty: 12 PATCH | Refills: 0 | Status: SHIPPED | OUTPATIENT
Start: 2025-03-10

## 2025-03-10 NOTE — ASSESSMENT & PLAN NOTE
Assessment-  Patient is having headaches everyday with her cough. BP was elevated in the office today to 165/101.    Plan-  Encouraged supportive care such as tylenol, getting enough sleep, and eating/drinking enough  Will return in 2 weeks to monitor BP

## 2025-03-10 NOTE — ASSESSMENT & PLAN NOTE
- Patient with viral URI symptoms that started about a week ago that are only mildly improved  - Multiple sick contacts in the house. Reports using over-the-counter decongestants.  - Denies any shortness of breath, chest pain, lightheadedness or dizziness  - Supportive care for now; rest and hydration  - Patient is aware to return if symptoms do not improve or worsen  - Does not want to have CXR at this time

## 2025-03-10 NOTE — PROGRESS NOTES
Name: Rebeca Jacobsen      : 1980      MRN: [id number]  Encounter Provider: Diana Wheeler MD  Encounter Date: 3/10/2025   Encounter department: [facility] [place]  :  Assessment & Plan  Viral URI  - Patient with viral URI symptoms that started about a week ago that are only mildly improved  - Multiple sick contacts in the house. Reports using over-the-counter decongestants.  - Denies any shortness of breath, chest pain, lightheadedness or dizziness  - Supportive care for now; rest and hydration  - Patient is aware to return if symptoms do not improve or worsen  - Does not want to have CXR at this time  Elevated BP without diagnosis of hypertension  She has been taking OTC Robitussin which provides minimal relief. Patient was instructed to stop this medication as it may be contributing to her elevated BP of 165/101 today.    Patient does not have a prior diagnosis of HTN.  Denies any red flag symptoms  Follow-up in 2 weeks for blood pressure         Back pain, unspecified back location, unspecified back pain laterality, unspecified chronicity  Assessment-  Patient is experiencing back pain under her rib cage when coughing.    Plan-  Lidocaine patch every 12 hrs  Offered chest x-ray but patient declined    Orders:    lidocaine (Lidoderm) 5 %; Apply 1 patch topically over 12 hours daily as needed (back pain) Remove & Discard patch within 12 hours or as directed by MD        BMI Counseling: Body mass index is 39.53 kg/m². The BMI is above normal. No pharmacotherapy was ordered.     Depression Screening and Follow-up Plan: Patient was screened for depression during today's encounter. They screened negative with a PHQ-2 score of 0.        History of Present Illness   Ms. Rebeca Jacobsen is a 43 yo female presenting to the clinic today for 1 week hx of cough and headaches. She is also having some pain below her rib cage when coughing. Her PMH is significant for GERD,  "headaches/migraines, and iron deficiency anemia. She is concerned because the symptoms have been occurring for about a week with mild improvement.    Both of the grandchildren are also sick. The one grandchild tested negative for flu/covid/RSV/strep this morning at the urgent care. Patient denies rhinorrhea, fever, sore throat, N/V, chest pain, and SOB. On exam, lungs are clear.      Review of Systems   Constitutional: Negative.    HENT: Negative.  Negative for congestion, ear pain, rhinorrhea and sore throat.    Eyes: Negative.    Respiratory:  Positive for cough.    Cardiovascular: Negative.  Negative for chest pain and palpitations.   Gastrointestinal: Negative.  Negative for abdominal distention.   Endocrine: Negative.    Genitourinary: Negative.    Musculoskeletal:  Positive for back pain.   Skin: Negative.    Allergic/Immunologic: Negative.    Neurological:  Negative for light-headedness.   Hematological: Negative.    Psychiatric/Behavioral: Negative.         Objective   BP (!) 165/101 (BP Location: Left arm, Patient Position: Sitting, Cuff Size: Large)   Pulse 99   Temp 97.8 °F (36.6 °C) (Temporal)   Resp 18   Ht 5' 7\" (1.702 m)   Wt 114 kg (252 lb 6.4 oz)   SpO2 99%   BMI 39.53 kg/m²      Physical Exam  Vitals and nursing note reviewed.   Constitutional:       General: She is not in acute distress.     Appearance: She is well-developed.   HENT:      Head: Normocephalic and atraumatic.      Right Ear: Tympanic membrane normal.      Left Ear: Tympanic membrane normal.      Nose: No congestion or rhinorrhea.      Mouth/Throat:      Pharynx: Oropharynx is clear.   Eyes:      Conjunctiva/sclera: Conjunctivae normal.      Pupils: Pupils are equal, round, and reactive to light.   Cardiovascular:      Rate and Rhythm: Normal rate and regular rhythm.      Pulses: Normal pulses.      Heart sounds: Normal heart sounds. No murmur heard.  Pulmonary:      Effort: Pulmonary effort is normal. No respiratory distress. "      Breath sounds: Normal breath sounds.   Abdominal:      Palpations: Abdomen is soft.      Tenderness: There is no abdominal tenderness.   Musculoskeletal:         General: Tenderness present. No swelling.      Cervical back: Neck supple.      Comments: Tenderness b/l on back below her ribcage.   Skin:     General: Skin is warm and dry.      Capillary Refill: Capillary refill takes less than 2 seconds.   Neurological:      Mental Status: She is alert.   Psychiatric:         Mood and Affect: Mood normal.

## 2025-03-10 NOTE — ASSESSMENT & PLAN NOTE
Assessment-  Patient is experiencing back pain under her rib cage when coughing.    Plan-  Lidocaine patch every 12 hrs  Offered chest x-ray but patient declined    Orders:    lidocaine (Lidoderm) 5 %; Apply 1 patch topically over 12 hours daily as needed (back pain) Remove & Discard patch within 12 hours or as directed by MD

## 2025-03-10 NOTE — ASSESSMENT & PLAN NOTE
She has been taking OTC Robitussin which provides minimal relief. Patient was instructed to stop this medication as it may be contributing to her elevated BP of 165/101 today.    Patient does not have a prior diagnosis of HTN.  Denies any red flag symptoms  Follow-up in 2 weeks for blood pressure

## 2025-03-14 ENCOUNTER — TELEPHONE (OUTPATIENT)
Dept: FAMILY MEDICINE CLINIC | Facility: CLINIC | Age: 45
End: 2025-03-14

## 2025-03-14 DIAGNOSIS — G44.221 CHRONIC TENSION-TYPE HEADACHE, INTRACTABLE: ICD-10-CM

## 2025-03-14 NOTE — TELEPHONE ENCOUNTER
Patient called stating when she was in on 3/10 Dr told her that she would send in a refill on her migraine medication. Patient unsure of the name

## 2025-03-16 RX ORDER — RIZATRIPTAN BENZOATE 10 MG/1
10 TABLET, ORALLY DISINTEGRATING ORAL ONCE AS NEEDED
Qty: 9 TABLET | Refills: 3 | Status: SHIPPED | OUTPATIENT
Start: 2025-03-16

## 2025-03-24 ENCOUNTER — OFFICE VISIT (OUTPATIENT)
Dept: FAMILY MEDICINE CLINIC | Facility: CLINIC | Age: 45
End: 2025-03-24

## 2025-03-24 VITALS
SYSTOLIC BLOOD PRESSURE: 158 MMHG | HEIGHT: 67 IN | HEART RATE: 93 BPM | BODY MASS INDEX: 39.08 KG/M2 | OXYGEN SATURATION: 100 % | DIASTOLIC BLOOD PRESSURE: 98 MMHG | WEIGHT: 249 LBS | TEMPERATURE: 98.2 F

## 2025-03-24 DIAGNOSIS — R03.0 ELEVATED BP WITHOUT DIAGNOSIS OF HYPERTENSION: Primary | ICD-10-CM

## 2025-03-24 DIAGNOSIS — G43.909 MIGRAINE WITHOUT STATUS MIGRAINOSUS, NOT INTRACTABLE, UNSPECIFIED MIGRAINE TYPE: ICD-10-CM

## 2025-03-24 PROCEDURE — 99213 OFFICE O/P EST LOW 20 MIN: CPT | Performed by: FAMILY MEDICINE

## 2025-03-24 RX ORDER — SENNOSIDES 8.6 MG
650 CAPSULE ORAL EVERY 8 HOURS PRN
Qty: 30 TABLET | Refills: 0 | Status: SHIPPED | OUTPATIENT
Start: 2025-03-24

## 2025-03-24 NOTE — PROGRESS NOTES
Name: Rebeca Jacobsen      : 1980      MRN: [id number]  Encounter Provider: Diana Wheeler MD  Encounter Date: 3/24/2025   Encounter department: [facility] [place]  :  Assessment & Plan  Elevated BP without diagnosis of hypertension  Patient here for follow-up for blood pressure.  At last visit patient patient noted to have elevated blood pressure thought to be d/t over-the-counter Robitussin use  On exam today blood pressure 158/98.  Repeat similar  Patient does not have a prior diagnosis of HTN.  Denies any lightheadedness, dizziness, shortness of breath or leg swelling. Does reports headache but currently undergoing migraine   Patient to check BP at home and to let us know what they are   Discussed with patient that because of headache plus visual changes plus blood pressure it is hard at this time to differentiate if this is secondary to migraine or secondary to hypertension emergency.  Discussed with patient that ultimately my recommendations would be to go to the ED to be evaluated however patient declines. Went of risk and concerns. Patient reports understanding.  Will follow-up in one week.       Migraine without status migrainosus, not intractable, unspecified migraine type  - History of migraines and has trialed several different medication that have been unsuccessful  - Denies any red flag symptoms on exam today   - Patient requesting Tylenol.  Will send 650 every 8 as needed  - Will send referral to Neurology   - follow-up in one week, recommended patient be seen earlier if sx's continue. ED follow-up     Orders:    Ambulatory Referral to Neurology; Future           History of Present Illness   Patient here for follow-up on blood pressure.  At last visit patient noted to have elevated blood pressure of 165/101.  Repeat blood pressure at that time was 159/98.    Hypertension  This is a new problem. The current episode started 1 to 4 weeks ago. Associated  symptoms include headaches. Pertinent negatives include no anxiety, malaise/fatigue, shortness of breath or sweats. Risk factors for coronary artery disease include obesity and stress. Past treatments include nothing.   Migraine  This is a chronic problem. Episode onset: over 20 years. The problem occurs monthly. The pain is present in the right unilateral. The pain does not radiate. The pain quality is similar to prior headaches. The pain is moderate. Associated symptoms include nausea, phonophobia and photophobia. Pertinent negatives include no abnormal behavior, diarrhea, dizziness, drainage, eye watering, loss of balance, muscle aches, rhinorrhea or vomiting. The symptoms are aggravated by unknown. Past treatments include NSAIDs, acetaminophen and triptans. The treatment provided moderate relief. Her past medical history is significant for migraines in the family and obesity. There is no history of recent head traumas or a seizure disorder.     Review of Systems   Constitutional:  Negative for malaise/fatigue.   HENT:  Negative for rhinorrhea.    Eyes:  Positive for photophobia.   Respiratory:  Negative for shortness of breath.    Gastrointestinal:  Positive for nausea. Negative for diarrhea and vomiting.   Neurological:  Positive for headaches. Negative for dizziness and loss of balance.       Objective   There were no vitals taken for this visit.     Physical Exam  Vitals and nursing note reviewed.   HENT:      Nose: No congestion.   Eyes:      Extraocular Movements: Extraocular movements intact.      Pupils: Pupils are equal, round, and reactive to light.   Cardiovascular:      Rate and Rhythm: Normal rate.      Heart sounds: No murmur heard.  Pulmonary:      Effort: Pulmonary effort is normal. No respiratory distress.   Abdominal:      General: There is no distension.      Tenderness: There is no abdominal tenderness.   Musculoskeletal:      Right lower leg: No edema.      Left lower leg: No edema.    Neurological:      Mental Status: She is alert.

## 2025-03-24 NOTE — ASSESSMENT & PLAN NOTE
Patient here for follow-up for blood pressure.  At last visit patient patient noted to have elevated blood pressure thought to be d/t over-the-counter Robitussin use  On exam today blood pressure 158/98.  Repeat similar  Patient does not have a prior diagnosis of HTN.  Denies any lightheadedness, dizziness, shortness of breath or leg swelling. Does reports headache but currently undergoing migraine   Patient to check BP at home and to let us know what they are   Discussed with patient that because of headache plus visual changes plus blood pressure it is hard at this time to differentiate if this is secondary to migraine or secondary to hypertension emergency.  Discussed with patient that ultimately my recommendations would be to go to the ED to be evaluated however patient declines. Went of risk and concerns. Patient reports understanding.  Will follow-up in one week.

## 2025-03-26 ENCOUNTER — TELEPHONE (OUTPATIENT)
Dept: FAMILY MEDICINE CLINIC | Facility: CLINIC | Age: 45
End: 2025-03-26

## 2025-03-26 NOTE — TELEPHONE ENCOUNTER
Check Out on 3/24/25 patient was told by the Clinician she needed to have the follow up appointment prior to 4/14/25.   Unfortunately neither PCP or Clinician is available before that date.   Patient wanted the clinician and PCP to be aware of it.    Patient can be reached at [phone]

## 2025-03-31 NOTE — TELEPHONE ENCOUNTER
Check schedule again and there is no other appointment available sooner with PCP or Dr. Wheeler.

## 2025-04-09 PROBLEM — J06.9 VIRAL URI: Status: RESOLVED | Noted: 2025-03-10 | Resolved: 2025-04-09

## 2025-04-14 ENCOUNTER — OFFICE VISIT (OUTPATIENT)
Dept: FAMILY MEDICINE CLINIC | Facility: CLINIC | Age: 45
End: 2025-04-14

## 2025-04-14 VITALS
HEART RATE: 96 BPM | HEIGHT: 67 IN | DIASTOLIC BLOOD PRESSURE: 100 MMHG | TEMPERATURE: 98.5 F | SYSTOLIC BLOOD PRESSURE: 153 MMHG | RESPIRATION RATE: 18 BRPM | BODY MASS INDEX: 39.15 KG/M2 | WEIGHT: 249.4 LBS | OXYGEN SATURATION: 97 %

## 2025-04-14 DIAGNOSIS — R53.83 OTHER FATIGUE: ICD-10-CM

## 2025-04-14 DIAGNOSIS — G43.109 MIGRAINE WITH AURA AND WITHOUT STATUS MIGRAINOSUS, NOT INTRACTABLE: Primary | ICD-10-CM

## 2025-04-14 DIAGNOSIS — I10 PRIMARY HYPERTENSION: ICD-10-CM

## 2025-04-14 PROCEDURE — 99213 OFFICE O/P EST LOW 20 MIN: CPT | Performed by: FAMILY MEDICINE

## 2025-04-14 RX ORDER — KETOROLAC TROMETHAMINE 30 MG/ML
30 INJECTION, SOLUTION INTRAMUSCULAR; INTRAVENOUS ONCE
Status: COMPLETED | OUTPATIENT
Start: 2025-04-14 | End: 2025-04-14

## 2025-04-14 RX ORDER — HYDROCHLOROTHIAZIDE 12.5 MG/1
25 TABLET ORAL DAILY
Qty: 180 TABLET | Refills: 1 | Status: SHIPPED | OUTPATIENT
Start: 2025-04-14

## 2025-04-14 RX ADMIN — KETOROLAC TROMETHAMINE 30 MG: 30 INJECTION, SOLUTION INTRAMUSCULAR; INTRAVENOUS at 15:09

## 2025-04-15 NOTE — PROGRESS NOTES
Name: Rebeca Jacobsen      : 1980      MRN: [id number]  Encounter Provider: Dante Jimenez MD  Encounter Date: 2025   Encounter department: [facility] [place]  :  Assessment & Plan  Migraine with aura and without status migrainosus, not intractable  - Continues with R sided migraines with photophobia and vision blurriness - currently symptomatic   - Cranial nerve exam grossly normal   - Previously prescribed rizatriptan 10 mg but states unhelpful  - On OTC Excedrin migraine PRN  Orders:    ketorolac (TORADOL) injection 30 mg  Scheduled with Neurology   ED precautions given for worsening symptoms  Primary hypertension  - Consistently elevated blood pressures, may be associated with migraines  Orders:    hydroCHLOROthiazide 12.5 mg tablet; Take 2 tablets (25 mg total) by mouth daily    Comprehensive metabolic panel; Future  Encouraged to take ambulatory BP logs at home, if BP >140/80 consistently, should start BP medication  F/U in 2 weeks  DASH diet, lifestyle   Other fatigue  - Continues with fatigue symptoms  - Previous hx of anemia requiring venofer  - Denies vaginal or colonic bleeding  Orders:    CBC and differential; Future    Iron Panel (Includes Ferritin, Iron Sat%, Iron, and TIBC); Future    Retic Count with Reticulocyte HGB; Future    Vitamin D 25 hydroxy; Future    Vitamin B12; Future    Comprehensive metabolic panel; Future           History of Present Illness   43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 Patient states migraines on R side always with photophobia and blurry vision, scheduled to see Neuro. Unsure if BP correlates with migraines. Has BP cuff at home but does not use. Willing to take BP logs and start meds as needed. Request for labs for continued fatigue. Wondering if needs repeat venofer.     Headache    Review of Systems   Constitutional:   "Positive for fatigue. Negative for chills and fever.   HENT:  Negative for ear pain and sore throat.    Eyes:  Negative for pain and visual disturbance.   Respiratory:  Negative for cough and shortness of breath.    Cardiovascular:  Negative for chest pain and palpitations.   Gastrointestinal:  Negative for abdominal pain and vomiting.   Genitourinary:  Negative for dysuria and hematuria.   Musculoskeletal:  Negative for arthralgias and back pain.   Skin:  Negative for color change and rash.   Neurological:  Positive for headaches. Negative for seizures and syncope.   All other systems reviewed and are negative.      Objective   /100 (BP Location: Left arm, Patient Position: Sitting, Cuff Size: Large)   Pulse 96   Temp 98.5 °F (36.9 °C) (Temporal)   Resp 18   Ht 5' 7\" (1.702 m)   Wt 113 kg (249 lb 6.4 oz)   SpO2 97%   BMI 39.06 kg/m²      Physical Exam  Vitals and nursing note reviewed.   Constitutional:       General: She is not in acute distress.     Appearance: She is well-developed. She is obese.   HENT:      Head: Normocephalic and atraumatic.      Right Ear: External ear normal.      Left Ear: External ear normal.      Nose: Nose normal.      Mouth/Throat:      Mouth: Mucous membranes are moist.      Pharynx: Oropharynx is clear.   Eyes:      Conjunctiva/sclera: Conjunctivae normal.   Cardiovascular:      Rate and Rhythm: Normal rate and regular rhythm.      Pulses: Normal pulses.      Heart sounds: Normal heart sounds. No murmur heard.  Pulmonary:      Effort: Pulmonary effort is normal. No respiratory distress.      Breath sounds: Normal breath sounds.   Abdominal:      General: Abdomen is flat. Bowel sounds are normal.      Palpations: Abdomen is soft.      Tenderness: There is no abdominal tenderness. There is no right CVA tenderness or left CVA tenderness.   Musculoskeletal:         General: No swelling.      Cervical back: Neck supple.      Right lower leg: No edema.      Left lower leg: No " edema.   Skin:     General: Skin is warm and dry.      Capillary Refill: Capillary refill takes less than 2 seconds.   Neurological:      General: No focal deficit present.      Mental Status: She is alert and oriented to person, place, and time. Mental status is at baseline.      Cranial Nerves: Cranial nerves 2-12 are intact.   Psychiatric:         Mood and Affect: Mood normal.

## 2025-04-15 NOTE — ASSESSMENT & PLAN NOTE
- Continues with R sided migraines with photophobia and vision blurriness - currently symptomatic   - Cranial nerve exam grossly normal   - Previously prescribed rizatriptan 10 mg but states unhelpful  - On OTC Excedrin migraine PRN  Orders:    ketorolac (TORADOL) injection 30 mg  Scheduled with Neurology   ED precautions given for worsening symptoms

## 2025-04-25 ENCOUNTER — TELEPHONE (OUTPATIENT)
Dept: NEUROLOGY | Facility: CLINIC | Age: 45
End: 2025-04-25

## 2025-04-25 NOTE — TELEPHONE ENCOUNTER
Unable to reach patient by phone. Will send an message through my chart. For patient to give us a call back to confirm appt.

## 2025-05-12 ENCOUNTER — OFFICE VISIT (OUTPATIENT)
Dept: FAMILY MEDICINE CLINIC | Facility: CLINIC | Age: 45
End: 2025-05-12

## 2025-05-12 VITALS
HEIGHT: 67 IN | DIASTOLIC BLOOD PRESSURE: 88 MMHG | TEMPERATURE: 99.1 F | HEART RATE: 88 BPM | BODY MASS INDEX: 38.74 KG/M2 | RESPIRATION RATE: 18 BRPM | SYSTOLIC BLOOD PRESSURE: 135 MMHG | OXYGEN SATURATION: 99 % | WEIGHT: 246.8 LBS

## 2025-05-12 DIAGNOSIS — R03.0 ELEVATED BP WITHOUT DIAGNOSIS OF HYPERTENSION: ICD-10-CM

## 2025-05-12 DIAGNOSIS — G43.109 MIGRAINE WITH AURA AND WITHOUT STATUS MIGRAINOSUS, NOT INTRACTABLE: Primary | ICD-10-CM

## 2025-05-12 PROCEDURE — G2211 COMPLEX E/M VISIT ADD ON: HCPCS | Performed by: FAMILY MEDICINE

## 2025-05-12 PROCEDURE — 99213 OFFICE O/P EST LOW 20 MIN: CPT | Performed by: FAMILY MEDICINE

## 2025-05-12 RX ORDER — KETOROLAC TROMETHAMINE 30 MG/ML
30 INJECTION, SOLUTION INTRAMUSCULAR; INTRAVENOUS ONCE
Status: COMPLETED | OUTPATIENT
Start: 2025-05-12 | End: 2025-05-12

## 2025-05-12 RX ORDER — AMLODIPINE BESYLATE 5 MG/1
5 TABLET ORAL DAILY
Qty: 90 TABLET | Refills: 3 | Status: SHIPPED | OUTPATIENT
Start: 2025-05-12

## 2025-05-12 RX ADMIN — KETOROLAC TROMETHAMINE 30 MG: 30 INJECTION, SOLUTION INTRAMUSCULAR; INTRAVENOUS at 14:29

## 2025-05-12 NOTE — ASSESSMENT & PLAN NOTE
Elevated BP's on ambulatory BP log, but did show improvement with HTZ. Discussed importance of diet and adequate hydration. This may help her migraines as well.      Plan:  -Continue ambulatory readings at home   -Continue DASH diet  -Start amlodipine 5mg once daily.  -Follow up in a month.    Orders:    amLODIPine (NORVASC) 5 mg tablet; Take 1 tablet (5 mg total) by mouth daily

## 2025-05-12 NOTE — ASSESSMENT & PLAN NOTE
Intractable headache beginning Saturday, unable to resolve the pain. Symptomatic today. Associated photophobia, phonophobia, blurry vision, and nausea. Denies fevers, muscle pains, or URI symptoms. Cranial nerve exam normal. This is a recurrent problem for her. Will need to reschedule with neurology.    Plan:  -Reschedule with Neurology  -Get toradol shot today in office.  -Continue rizatriptan, excedrin for migraines  -Supportive care   -Go directly to ED if headache occurs with new, unusual symptoms.    Orders:    ketorolac (TORADOL) injection 30 mg

## 2025-05-12 NOTE — PROGRESS NOTES
Name: Rebeca Jacobsen      : 1980      MRN: [id number]  Encounter Provider: Dante Jimenez MD  Encounter Date: 2025   Encounter department: [facility] [place]  :  Assessment & Plan  Migraine with aura and without status migrainosus, not intractable    Intractable headache beginning Saturday, unable to resolve the pain. Symptomatic today. Associated photophobia, phonophobia, blurry vision, and nausea. Denies fevers, muscle pains, or URI symptoms. Cranial nerve exam normal. This is a recurrent problem for her. Will need to reschedule with neurology.    Plan:  -Reschedule with Neurology  -Get toradol shot today in office.  -Continue rizatriptan, excedrin for migraines  -Supportive care   -Go directly to ED if headache occurs with new, unusual symptoms.    Orders:    ketorolac (TORADOL) injection 30 mg    Elevated BP without diagnosis of hypertension    Elevated BP's on ambulatory BP log, but did show improvement with HTZ. Discussed importance of diet and adequate hydration. This may help her migraines as well.      Plan:  -Continue ambulatory readings at home   -Continue DASH diet  -Start amlodipine 5mg once daily.  -Follow up in a month.    Orders:    amLODIPine (NORVASC) 5 mg tablet; Take 1 tablet (5 mg total) by mouth daily           History of Present Illness   Patient 45 y/o female with a past medical history of hypertension and migraines presenting due to worsening of her migraine headaches. This is a recurrent problem for her. Since Saturday, the patient describes an intractable headache with associated photophobia, phonophobia and nausea. She said in the past few weeks that they are on and off, but this one will not go away. She believes that work may be a trigger due to stress. The pain radiates from her right anterior head down to her right shoulder. She takes Excedrin which lessens the pain and rizatriptan which has not helped at all. She endorses blurry  "vision and feels a right temporal pain shortly before migraine comes on. Denies fever, chills, and URI symptoms. She was scheduled to see neurologist but did not make it- patient needs phone number to reschedule. Patient also brought her ambulatory BP log, which showed some improvement in BP but still >140/90.     Headache    Review of Systems   Constitutional:  Negative for chills and fever.   HENT:  Negative for hearing loss and sore throat.    Eyes:  Positive for photophobia. Negative for redness.   Respiratory:  Negative for chest tightness and shortness of breath.    Cardiovascular:  Negative for chest pain and palpitations.   Gastrointestinal:  Positive for nausea. Negative for abdominal pain and vomiting.   Musculoskeletal:  Positive for neck pain. Negative for arthralgias.        Right sided neck/shoulder pain radiating from headache.   Neurological:  Positive for headaches. Negative for weakness and light-headedness.   Psychiatric/Behavioral: Negative.         Objective   /88 (BP Location: Left arm, Patient Position: Sitting, Cuff Size: Standard)   Pulse 88   Temp 99.1 °F (37.3 °C) (Temporal)   Resp 18   Ht 5' 7\" (1.702 m)   Wt 112 kg (246 lb 12.8 oz)   SpO2 99%   BMI 38.65 kg/m²      Physical Exam  Constitutional:       General: She is not in acute distress.     Appearance: Normal appearance.   HENT:      Head: Normocephalic.      Right Ear: External ear normal.      Left Ear: External ear normal.      Mouth/Throat:      Mouth: Mucous membranes are moist.      Pharynx: Oropharynx is clear.   Eyes:      General:         Right eye: No discharge.         Left eye: No discharge.      Conjunctiva/sclera: Conjunctivae normal.   Cardiovascular:      Rate and Rhythm: Normal rate and regular rhythm.      Pulses: Normal pulses.      Heart sounds: Normal heart sounds.   Pulmonary:      Effort: Pulmonary effort is normal.      Breath sounds: Normal breath sounds.   Musculoskeletal:      Right lower leg: " Edema present.      Left lower leg: Edema present.      Comments: Trace edema present bilaterally.  Pain on right shoulder when rotating head to either side.   Lymphadenopathy:      Cervical: No cervical adenopathy.   Neurological:      General: No focal deficit present.      Mental Status: She is alert and oriented to person, place, and time. Mental status is at baseline.      Cranial Nerves: No cranial nerve deficit.      Motor: No weakness.   Psychiatric:         Mood and Affect: Mood normal.         Behavior: Behavior normal.

## 2025-06-09 ENCOUNTER — OFFICE VISIT (OUTPATIENT)
Dept: FAMILY MEDICINE CLINIC | Facility: CLINIC | Age: 45
End: 2025-06-09

## 2025-06-09 VITALS
OXYGEN SATURATION: 100 % | HEART RATE: 76 BPM | WEIGHT: 246 LBS | DIASTOLIC BLOOD PRESSURE: 91 MMHG | BODY MASS INDEX: 38.61 KG/M2 | SYSTOLIC BLOOD PRESSURE: 138 MMHG | TEMPERATURE: 97.9 F | HEIGHT: 67 IN

## 2025-06-09 DIAGNOSIS — G43.109 MIGRAINE WITH AURA AND WITHOUT STATUS MIGRAINOSUS, NOT INTRACTABLE: Primary | ICD-10-CM

## 2025-06-09 DIAGNOSIS — I10 PRIMARY HYPERTENSION: ICD-10-CM

## 2025-06-09 PROBLEM — R03.0 ELEVATED BP WITHOUT DIAGNOSIS OF HYPERTENSION: Status: RESOLVED | Noted: 2018-07-20 | Resolved: 2025-06-09

## 2025-06-09 PROCEDURE — G2211 COMPLEX E/M VISIT ADD ON: HCPCS | Performed by: FAMILY MEDICINE

## 2025-06-09 PROCEDURE — 99213 OFFICE O/P EST LOW 20 MIN: CPT | Performed by: FAMILY MEDICINE

## 2025-06-09 NOTE — PROGRESS NOTES
Name: Rebeca Jacobsen      : 1980      MRN: [id number]  Encounter Provider: Dante Jimenez MD  Encounter Date: 2025   Encounter department: [facility] [place]  :  Assessment & Plan  Migraine with aura and without status migrainosus, not intractable  - Continued intermittent migraine with photophobia   - Current tx: tylenol, rizatriptan PRN  - Cranial nerves grossly intact  Wear sunglasses to avoid exposure to light during attacks  Increase hydration significantly   Decrease stress  Continue medications   Offered in-clinic toradol shot - declined   Follow up with Neurology as scheduled   ED precautions given   Complete previous lab work        Primary hypertension  - BP today in clinic: Blood Pressure: 138/91    - Current medications: HCTZ 25 mg daily (not taking amlodipine)  - Denies vision changes, chest pain, sob  - Renal      Lab Results   Component Value Date    BUN 12 2024    CREATININE 0.81 2024    EGFR 88 2024     Continue current regimen; consider addition of amlodipine or ACEi/ARB   Recommend ambulatory BP log  Follow DASH diet, reference given  Exercise 150 minutes weekly  Complete previously ordered lab work               History of Present Illness   45 yo female with PMH of migraines presents for follow up for migraine attacks. Triggers possible with dehdyration and very stressful job. Patient states current triptan unhelpful and awaiting Neurology - now scheduled for next week. Denies current blurry vision, stroke like symptoms. Blood pressure at home improved after start of medications; states compliance.       Review of Systems   Constitutional:  Negative for chills and fever.   HENT:  Negative for ear pain and sore throat.    Eyes:  Negative for pain and visual disturbance.   Respiratory:  Negative for cough and shortness of breath.    Cardiovascular:  Negative for chest pain and palpitations.   Gastrointestinal:  Negative for  "abdominal pain and vomiting.   Genitourinary:  Negative for dysuria and hematuria.   Musculoskeletal:  Negative for arthralgias and back pain.   Skin:  Negative for color change and rash.   Neurological:  Positive for headaches. Negative for seizures and syncope.   All other systems reviewed and are negative.      Objective   /91 (BP Location: Left arm, Patient Position: Sitting, Cuff Size: Large)   Pulse 76   Temp 97.9 °F (36.6 °C) (Temporal)   Ht 5' 7\" (1.702 m)   Wt 112 kg (246 lb)   SpO2 100%   BMI 38.53 kg/m²      Physical Exam  Vitals and nursing note reviewed.   Constitutional:       General: She is not in acute distress.     Appearance: She is well-developed.   HENT:      Head: Normocephalic and atraumatic.      Right Ear: External ear normal.      Left Ear: External ear normal.      Nose: Nose normal.      Mouth/Throat:      Mouth: Mucous membranes are moist.      Pharynx: Oropharynx is clear.     Eyes:      General: No visual field deficit.     Conjunctiva/sclera: Conjunctivae normal.       Cardiovascular:      Rate and Rhythm: Normal rate and regular rhythm.      Pulses: Normal pulses.      Heart sounds: Normal heart sounds. No murmur heard.  Pulmonary:      Effort: Pulmonary effort is normal. No respiratory distress.      Breath sounds: Normal breath sounds.   Abdominal:      General: Abdomen is flat. Bowel sounds are normal.      Palpations: Abdomen is soft.      Tenderness: There is no abdominal tenderness. There is no right CVA tenderness or left CVA tenderness.     Musculoskeletal:         General: No swelling.      Cervical back: Neck supple.     Skin:     General: Skin is warm and dry.      Capillary Refill: Capillary refill takes less than 2 seconds.     Neurological:      General: No focal deficit present.      Mental Status: She is alert and oriented to person, place, and time. Mental status is at baseline.      Cranial Nerves: Cranial nerves 2-12 are intact. No cranial nerve " deficit, dysarthria or facial asymmetry.      Sensory: Sensation is intact.      Motor: Motor function is intact.      Coordination: Coordination is intact.      Gait: Gait is intact.     Psychiatric:         Mood and Affect: Mood normal.

## 2025-06-10 NOTE — ASSESSMENT & PLAN NOTE
- Continued intermittent migraine with photophobia   - Current tx: tylenol, rizatriptan PRN  - Cranial nerves grossly intact  Wear sunglasses to avoid exposure to light during attacks  Increase hydration significantly   Decrease stress  Continue medications   Offered in-clinic toradol shot - declined   Follow up with Neurology as scheduled   ED precautions given   Complete previous lab work

## 2025-06-10 NOTE — ASSESSMENT & PLAN NOTE
- BP today in clinic: Blood Pressure: 138/91    - Current medications: HCTZ 25 mg daily (not taking amlodipine)  - Denies vision changes, chest pain, sob  - Renal      Lab Results   Component Value Date    BUN 12 01/08/2024    CREATININE 0.81 01/08/2024    EGFR 88 01/08/2024     Continue current regimen; consider addition of amlodipine or ACEi/ARB   Recommend ambulatory BP log  Follow DASH diet, reference given  Exercise 150 minutes weekly  Complete previously ordered lab work

## 2025-06-16 ENCOUNTER — TELEPHONE (OUTPATIENT)
Dept: NEUROLOGY | Facility: CLINIC | Age: 45
End: 2025-06-16

## 2025-06-16 LAB
25(OH)D3+25(OH)D2 SERPL-MCNC: 5.2 NG/ML (ref 30–100)
ALBUMIN SERPL-MCNC: 3.7 G/DL (ref 3.9–4.9)
ALP SERPL-CCNC: 90 IU/L (ref 44–121)
ALT SERPL-CCNC: 14 IU/L (ref 0–32)
AST SERPL-CCNC: 16 IU/L (ref 0–40)
BASOPHILS # BLD AUTO: 0.1 X10E3/UL (ref 0–0.2)
BASOPHILS NFR BLD AUTO: 1 %
BILIRUB SERPL-MCNC: 0.4 MG/DL (ref 0–1.2)
BUN SERPL-MCNC: 10 MG/DL (ref 6–24)
BUN/CREAT SERPL: 12 (ref 9–23)
CALCIUM SERPL-MCNC: 8.8 MG/DL (ref 8.7–10.2)
CHLORIDE SERPL-SCNC: 101 MMOL/L (ref 96–106)
CO2 SERPL-SCNC: 21 MMOL/L (ref 20–29)
CREAT SERPL-MCNC: 0.81 MG/DL (ref 0.57–1)
EGFR: 91 ML/MIN/1.73
EOSINOPHIL # BLD AUTO: 0.2 X10E3/UL (ref 0–0.4)
EOSINOPHIL NFR BLD AUTO: 3 %
ERYTHROCYTE [DISTWIDTH] IN BLOOD BY AUTOMATED COUNT: 17.6 % (ref 11.7–15.4)
GLOBULIN SER-MCNC: 3.4 G/DL (ref 1.5–4.5)
GLUCOSE SERPL-MCNC: 96 MG/DL (ref 70–99)
HCT VFR BLD AUTO: 41.5 % (ref 34–46.6)
HGB BLD-MCNC: 13.2 G/DL (ref 11.1–15.9)
IMM GRANULOCYTES # BLD: 0 X10E3/UL (ref 0–0.1)
IMM GRANULOCYTES NFR BLD: 0 %
LYMPHOCYTES # BLD AUTO: 1.5 X10E3/UL (ref 0.7–3.1)
LYMPHOCYTES NFR BLD AUTO: 20 %
MCH RBC QN AUTO: 26.1 PG (ref 26.6–33)
MCHC RBC AUTO-ENTMCNC: 31.8 G/DL (ref 31.5–35.7)
MCV RBC AUTO: 82 FL (ref 79–97)
MONOCYTES # BLD AUTO: 0.5 X10E3/UL (ref 0.1–0.9)
MONOCYTES NFR BLD AUTO: 7 %
NEUTROPHILS # BLD AUTO: 5.3 X10E3/UL (ref 1.4–7)
NEUTROPHILS NFR BLD AUTO: 69 %
PLATELET # BLD AUTO: 428 X10E3/UL (ref 150–450)
POTASSIUM SERPL-SCNC: 3.8 MMOL/L (ref 3.5–5.2)
PROT SERPL-MCNC: 7.1 G/DL (ref 6–8.5)
RBC # BLD AUTO: 5.06 X10E6/UL (ref 3.77–5.28)
SODIUM SERPL-SCNC: 138 MMOL/L (ref 134–144)
VIT B12 SERPL-MCNC: 282 PG/ML (ref 232–1245)
WBC # BLD AUTO: 7.5 X10E3/UL (ref 3.4–10.8)

## 2025-06-17 ENCOUNTER — OFFICE VISIT (OUTPATIENT)
Dept: NEUROLOGY | Facility: CLINIC | Age: 45
End: 2025-06-17
Payer: MEDICARE

## 2025-06-17 VITALS
HEART RATE: 80 BPM | HEIGHT: 67 IN | DIASTOLIC BLOOD PRESSURE: 76 MMHG | BODY MASS INDEX: 39.08 KG/M2 | SYSTOLIC BLOOD PRESSURE: 134 MMHG | WEIGHT: 249 LBS

## 2025-06-17 DIAGNOSIS — G43.909 MIGRAINE WITHOUT STATUS MIGRAINOSUS, NOT INTRACTABLE, UNSPECIFIED MIGRAINE TYPE: Primary | ICD-10-CM

## 2025-06-17 PROCEDURE — 99205 OFFICE O/P NEW HI 60 MIN: CPT | Performed by: PSYCHIATRY & NEUROLOGY

## 2025-06-17 RX ORDER — VERAPAMIL HYDROCHLORIDE 120 MG/1
120 TABLET, FILM COATED, EXTENDED RELEASE ORAL
Qty: 30 TABLET | Refills: 5 | Status: SHIPPED | OUTPATIENT
Start: 2025-06-17

## 2025-06-17 NOTE — ASSESSMENT & PLAN NOTE
44 year old F is here for the initial evaluation of migraine headache.    Patient likely has complex migraine headache associated with blurry vision, photophobia, photophobia, nausea and ipsilateral body pain afterward.    Patient failed the following medications:  Rizatriptan 10mg PRN and Sumatriptan 25mg PRN  Amitriptyline 10mg nightly  Topiramate 50mg two times daily    Plan:  - Case discussed with Dr. Finley  -Will initiate verapamil sustained-release 120 mg nightly.  Discontinue home amlodipine.  -For rescue medication, will trial Nurtec as needed since patient failed to Triptans.  Discontinue rizatriptan.  -Sent staff message to PCP Dr. Jimenez for the above changes and patient will let her know as well.  -Will check MRI brain and MRA head due to worsening frequency of migraine and a bit of changes in characteristic and some positional component  - Recommend patient to drink at least 60-80 ounce of water, and sleep at least 6 to 8 hours a night  -Recommend patient to start taking magnesium 400 mg daily, riboflavin 400 mg daily and multivitamin    Follow-up in about 3 to 4 months    Orders:    Ambulatory Referral to Neurology    verapamil (CALAN-SR) 120 mg CR tablet; Take 1 tablet (120 mg total) by mouth daily at bedtime    rimegepant sulfate (NURTEC) 75 mg TBDP; Take 1 tablet (75 mg total) by mouth if needed (migraine) Do not take more than 1 tablet in 24 hours    MRI brain without contrast; Future    MRA head wo contrast; Future

## 2025-06-17 NOTE — PROGRESS NOTES
Name: Rebeca Jacobsen      : 1980      MRN: [id number]  Encounter Provider: Rochelle Winn MD  Encounter Date: 2025   Encounter department: [facility] [place]  :  Assessment & Plan  Migraine without status migrainosus, not intractable, unspecified migraine type    44 year old F is here for the initial evaluation of migraine headache.    Patient likely has complex migraine headache associated with blurry vision, photophobia, photophobia, nausea and ipsilateral body pain afterward.    Patient failed the following medications:  Rizatriptan 10mg PRN and Sumatriptan 25mg PRN  Amitriptyline 10mg nightly  Topiramate 50mg two times daily    Plan:  - Case discussed with Dr. Finley  -Will initiate verapamil sustained-release 120 mg nightly.  Discontinue home amlodipine.  -For rescue medication, will trial Nurtec as needed since patient failed to Triptans.  Discontinue rizatriptan.  -Sent staff message to PCP Dr. Jimenez for the above changes and patient will let her know as well.  -Will check MRI brain and MRA head due to worsening frequency of migraine and a bit of changes in characteristic and some positional component  - Recommend patient to drink at least 60-80 ounce of water, and sleep at least 6 to 8 hours a night  -Recommend patient to start taking magnesium 400 mg daily, riboflavin 400 mg daily and multivitamin    Follow-up in about 3 to 4 months    Orders:    Ambulatory Referral to Neurology    verapamil (CALAN-SR) 120 mg CR tablet; Take 1 tablet (120 mg total) by mouth daily at bedtime    rimegepant sulfate (NURTEC) 75 mg TBDP; Take 1 tablet (75 mg total) by mouth if needed (migraine) Do not take more than 1 tablet in 24 hours    MRI brain without contrast; Future    MRA head wo contrast; Future        History of Present Illness   HPI     44 year old F is here for the initial evaluation of migraine headache.    Migraine headaches were started since 7 years old (when she started  "having menstruation). She has about 20 migraine headache days per month (roughly 1 migraine episode every week. Each episode can last 3 days minimum and up to a whole week sometimes). The location is varied. The pain is described as achy pain. Prior to the headache, patient notices an electric shocked sensation on the ipsilateral temple of the side she is going to have migraine. Headache is associated with light sensitivity, sound sensitivity, blurry vision, nausea/no vomiting. She has to be in the quiet dark room to be better. She has pain in her body on the side of the migraine.  Stress can induce migraine. She drinks about 24 oz of water, no coffee, no alcohol, no smoking. Her sleep is interrupted. She works as a nurse.     Fam hx: maternal relatives have migraine, no brain aneurysm    Medications tried but no improvement in migraine:  Rizatriptan 10mg PRN and Sumatriptan 25mg PRN  Amitriptyline 10mg nightly  Topiramate 50mg two times daily    Review of Systems   Constitutional:  Negative for chills and fever.   HENT:  Negative for ear pain and sore throat.    Eyes:  Negative for pain and visual disturbance.   Respiratory:  Negative for cough and shortness of breath.    Cardiovascular:  Negative for chest pain and palpitations.   Gastrointestinal:  Negative for abdominal pain and vomiting.   Genitourinary:  Negative for dysuria and hematuria.   Musculoskeletal:  Negative for arthralgias and back pain.   Skin:  Negative for color change and rash.   Neurological:  Positive for headaches. Negative for seizures and syncope.   All other systems reviewed and are negative.   I have personally reviewed the MA's review of systems and made changes as necessary.    Medications Ordered Prior to Encounter[1]      Objective   /76   Pulse 80   Ht 5' 7\" (1.702 m)   Wt 113 kg (249 lb)   BMI 39.00 kg/m²     Physical Exam  Constitutional:       General: She is not in acute distress.  HENT:      Head: Normocephalic and " atraumatic.      Nose: Nose normal.      Mouth/Throat:      Mouth: Mucous membranes are moist.      Pharynx: Oropharynx is clear. No oropharyngeal exudate or posterior oropharyngeal erythema.     Eyes:      General: Lids are normal.      Extraocular Movements: Extraocular movements intact.      Pupils: Pupils are equal, round, and reactive to light.       Cardiovascular:      Rate and Rhythm: Normal rate.      Pulses: Normal pulses.   Pulmonary:      Effort: Pulmonary effort is normal. No respiratory distress.     Musculoskeletal:         General: Normal range of motion.      Cervical back: Normal range of motion.     Skin:     General: Skin is warm and dry.     Neurological:      Mental Status: She is alert.      Motor: Motor strength is normal.    Psychiatric:         Mood and Affect: Mood normal.         Speech: Speech normal.       Neurological Exam  Mental Status  Alert. Oriented to person, place and time. Speech is normal. Language is fluent with no aphasia.    Cranial Nerves  CN II: Visual acuity is normal. Visual fields full to confrontation. Right funduscopic exam: not visualized. Left funduscopic exam: not visualized.  CN III, IV, VI: Extraocular movements intact bilaterally. Normal lids and orbits bilaterally. Pupils equal round and reactive to light bilaterally.  CN V: Facial sensation is normal.  CN VII: Full and symmetric facial movement.  CN VIII: Hearing is normal.  CN IX, X: Palate elevates symmetrically  CN XI: Shoulder shrug strength is normal.  CN XII: Tongue midline without atrophy or fasciculations.    Motor  Normal muscle bulk throughout. No fasciculations present. Normal muscle tone. No abnormal involuntary movements. Strength is 5/5 throughout all four extremities.    Sensory  Light touch is normal in upper and lower extremities.     Reflexes  Reduced reflexes throughout likely positioning and body habitus.    Coordination  Right: Finger-to-nose normal.Left: Finger-to-nose  normal.    Gait  Casual gait is normal including stance, stride, and arm swing.      Radiology Results Review : No pertinent imaging studies reviewed.         [1]   Current Outpatient Medications on File Prior to Visit   Medication Sig Dispense Refill    acetaminophen (TYLENOL) 650 mg CR tablet Take 1 tablet (650 mg total) by mouth every 8 (eight) hours as needed for headaches 30 tablet 0    hydroCHLOROthiazide 12.5 mg tablet Take 2 tablets (25 mg total) by mouth daily 180 tablet 1    lidocaine (Lidoderm) 5 % Apply 1 patch topically over 12 hours daily as needed (back pain) Remove & Discard patch within 12 hours or as directed by MD 12 patch 0    potassium chloride (Klor-Con M20) 20 mEq tablet Take 1 tablet (20 mEq total) by mouth daily 30 tablet 5    [DISCONTINUED] amLODIPine (NORVASC) 5 mg tablet Take 1 tablet (5 mg total) by mouth daily 90 tablet 3    [DISCONTINUED] rizatriptan (MAXALT-MLT) 10 mg disintegrating tablet Take 1 tablet (10 mg total) by mouth once as needed for migraine for up to 36 doses May repeat in 2 hours if needed 9 tablet 3     No current facility-administered medications on file prior to visit.

## 2025-06-17 NOTE — PATIENT INSTRUCTIONS
1.  Please stop taking amlodipine blood pressure medication.  Start taking verapamil 120 mg 1 tablet daily at bedtime    2.  Please take Nurtec as needed when migraine starts.  Do not take more than 1 tablet in 24 hours.  Stop taking rizatriptan.    3.  Please get MRI brain and MRA head    4. You can start taking Magnesium oxide or Magnesium glycinate 400mg daily and Riboflavin Vitamin B2 400mg daily. Multivitamin daily    5. Drink about 60-80 oz of water and sleep at least 6-8 hours

## 2025-06-25 ENCOUNTER — PATIENT MESSAGE (OUTPATIENT)
Age: 45
End: 2025-06-25

## 2025-06-26 ENCOUNTER — TELEPHONE (OUTPATIENT)
Dept: FAMILY MEDICINE CLINIC | Facility: CLINIC | Age: 45
End: 2025-06-26

## 2025-06-26 DIAGNOSIS — E55.9 VITAMIN D DEFICIENCY: Primary | ICD-10-CM

## 2025-06-26 RX ORDER — ERGOCALCIFEROL 1.25 MG/1
50000 CAPSULE, LIQUID FILLED ORAL WEEKLY
Qty: 12 CAPSULE | Refills: 1 | Status: SHIPPED | OUTPATIENT
Start: 2025-06-26

## 2025-06-26 NOTE — TELEPHONE ENCOUNTER
Patient was called to discuss blood work but no answer - no option for voicemail. Message related to clinical staff for follow up. Prescribed high dose Vitamin D for weekly intake; repeat blood test in about 3 months to determine next steps.

## 2025-06-27 NOTE — PATIENT COMMUNICATION
Pt did not see [employer] message with provider response, so she came into the office. I printed the message from Dr. Rojo and reviewed with patient.     She reports that she has not been able to get the Nurtec since insurance is not covering it. Told her I would check if a prior authorization could be done. Please call patient and update her whether a prior auth has been started or not.

## 2025-06-28 LAB
FERRITIN SERPL-MCNC: 18 NG/ML (ref 15–150)
IRON SATN MFR SERPL: 11 % (ref 15–55)
IRON SERPL-MCNC: 37 UG/DL (ref 27–159)
TIBC SERPL-MCNC: 352 UG/DL (ref 250–450)
UIBC SERPL-MCNC: 315 UG/DL (ref 131–425)

## 2025-06-30 ENCOUNTER — TELEPHONE (OUTPATIENT)
Age: 45
End: 2025-06-30

## 2025-06-30 NOTE — TELEPHONE ENCOUNTER
Osito Mishra routed this conversation to [facility] Prior Authorizations  [facility]urology [facility]  Osito Tad THOMPSON    6/27/25 10:25 AM  Note     Pt did not see [employer] message with provider response, so she came into the office. I printed the message from Dr. Rojo and reviewed with patient.      She reports that she has not been able to get the Nurtec since insurance is not covering it. Told her I would check if a prior authorization could be done. Please call patient and update her whether a prior auth has been started or not.

## 2025-07-02 NOTE — TELEPHONE ENCOUNTER
Nurtec PA submitted in CMM and received this immediate notification:    - Outcome:  Additional Information Required  Your PA request cannot be processed for the member plan submitted. For further inquiries please contact the number on the back of the member prescription card. (Message 1002).    Key: [id number]    Called [facility] at # [phone] to see what the issue is and spoke with Stephania. Per Stephania, [facility] takes care of their own prior authorizations. She provided this writer with the # [phone].    Called [facility] at the # provided and spoke with Miguel Angel, who will be faxing the PA form through our secure line.    Awaiting PA form.

## 2025-07-03 NOTE — TELEPHONE ENCOUNTER
Received PA form from Michael VALERIO for NURTEC 75 mg tablet SUBMITTED to [employer]    via    []CMM-KEY:   []Surescripts-Case ID #   []Availity-Auth ID # NDC #   [x]Faxed to plan [phone]  []Other website   []Phone call Case ID #     []PA sent as URGENT    All office notes, labs and other pertaining documents and studies sent. Clinical questions answered. Awaiting determination from insurance company.     Turnaround time for your insurance to make a decision on your Prior Authorization can take 7-21 business days.

## 2025-07-09 NOTE — TELEPHONE ENCOUNTER
Appointment Change  Cancel, Reschedule, Change to Virtual      Who are you speaking with? Patient   If it is not the patient, is the caller listed on the communication consent form? Yes   Which provider is the appointment scheduled with? Zulma Jenkins PA-C   When was the original appointment scheduled?    [facility] and [facility]   At which location is the appointment scheduled to take place? [place]   Was the appointment rescheduled?     Was the appointment changed from an in person visit to a virtual visit?    If so, please list the details of the change. 7/22 12:30pm Gregory Howe   What is the reason for the appointment change? Missed appmt       Was STAR transport scheduled? No   Does STAR transport need to be scheduled for the new visit (if applicable) No   Does the patient need an infusion appointment rescheduled? No   Does the patient have an upcoming infusion appointment scheduled? If so, when? No   Is the patient undergoing chemotherapy? No   For appointments cancelled with less than 24 hours:  Was the no-show policy reviewed? Yes        Statement Selected